# Patient Record
Sex: MALE | Race: BLACK OR AFRICAN AMERICAN | Employment: UNEMPLOYED | URBAN - METROPOLITAN AREA
[De-identification: names, ages, dates, MRNs, and addresses within clinical notes are randomized per-mention and may not be internally consistent; named-entity substitution may affect disease eponyms.]

---

## 2019-04-20 ENCOUNTER — IP HISTORICAL/CONVERTED ENCOUNTER (OUTPATIENT)
Dept: OTHER | Age: 54
End: 2019-04-20

## 2020-07-07 ENCOUNTER — IP HISTORICAL/CONVERTED ENCOUNTER (OUTPATIENT)
Dept: OTHER | Age: 55
End: 2020-07-07

## 2022-02-20 ENCOUNTER — HOSPITAL ENCOUNTER (EMERGENCY)
Age: 57
Discharge: HOME | End: 2022-02-20
Attending: EMERGENCY MEDICINE | Admitting: EMERGENCY MEDICINE
Payer: MEDICAID

## 2022-02-20 ENCOUNTER — APPOINTMENT (OUTPATIENT)
Dept: CT IMAGING | Age: 57
End: 2022-02-20
Attending: EMERGENCY MEDICINE
Payer: MEDICAID

## 2022-02-20 VITALS
WEIGHT: 170 LBS | HEART RATE: 97 BPM | HEIGHT: 73 IN | DIASTOLIC BLOOD PRESSURE: 91 MMHG | OXYGEN SATURATION: 99 % | RESPIRATION RATE: 18 BRPM | TEMPERATURE: 98 F | SYSTOLIC BLOOD PRESSURE: 210 MMHG | BODY MASS INDEX: 22.53 KG/M2

## 2022-02-20 DIAGNOSIS — M54.31 SCIATICA OF RIGHT SIDE: Primary | ICD-10-CM

## 2022-02-20 PROCEDURE — 99283 EMERGENCY DEPT VISIT LOW MDM: CPT

## 2022-02-20 PROCEDURE — 74011250636 HC RX REV CODE- 250/636: Performed by: EMERGENCY MEDICINE

## 2022-02-20 PROCEDURE — 74176 CT ABD & PELVIS W/O CONTRAST: CPT

## 2022-02-20 PROCEDURE — 96374 THER/PROPH/DIAG INJ IV PUSH: CPT

## 2022-02-20 RX ORDER — MORPHINE SULFATE 4 MG/ML
4 INJECTION INTRAVENOUS ONCE
Status: COMPLETED | OUTPATIENT
Start: 2022-02-20 | End: 2022-02-20

## 2022-02-20 RX ADMIN — MORPHINE SULFATE 4 MG: 4 INJECTION, SOLUTION INTRAMUSCULAR; INTRAVENOUS at 12:44

## 2022-02-20 NOTE — ED PROVIDER NOTES
HPI   Patient reports right-sided low back and thoracic pain with radiation down the posterior right lower extremity for several months. He reports it is acutely worsened over the last several days and he summoned EMS today because he \"could not take it anymore. \"  He denies any injury or trauma, focal weakness, anterior pain, change in urine or bowel habits or colors. No past medical history on file. No past surgical history on file. No family history on file. Social History     Socioeconomic History    Marital status: SINGLE     Spouse name: Not on file    Number of children: Not on file    Years of education: Not on file    Highest education level: Not on file   Occupational History    Not on file   Tobacco Use    Smoking status: Not on file    Smokeless tobacco: Not on file   Substance and Sexual Activity    Alcohol use: Not on file    Drug use: Not on file    Sexual activity: Not on file   Other Topics Concern    Not on file   Social History Narrative    Not on file     Social Determinants of Health     Financial Resource Strain:     Difficulty of Paying Living Expenses: Not on file   Food Insecurity:     Worried About Running Out of Food in the Last Year: Not on file    Francesca of Food in the Last Year: Not on file   Transportation Needs:     Lack of Transportation (Medical): Not on file    Lack of Transportation (Non-Medical):  Not on file   Physical Activity:     Days of Exercise per Week: Not on file    Minutes of Exercise per Session: Not on file   Stress:     Feeling of Stress : Not on file   Social Connections:     Frequency of Communication with Friends and Family: Not on file    Frequency of Social Gatherings with Friends and Family: Not on file    Attends Rastafarian Services: Not on file    Active Member of Clubs or Organizations: Not on file    Attends Club or Organization Meetings: Not on file    Marital Status: Not on file   Intimate Partner Violence:     Fear of Current or Ex-Partner: Not on file    Emotionally Abused: Not on file    Physically Abused: Not on file    Sexually Abused: Not on file   Housing Stability:     Unable to Pay for Housing in the Last Year: Not on file    Number of Places Lived in the Last Year: Not on file    Unstable Housing in the Last Year: Not on file         ALLERGIES: Patient has no known allergies. Review of Systems   Constitutional: Negative. HENT: Negative. Eyes: Negative. Respiratory: Negative. Cardiovascular: Negative. Gastrointestinal: Negative. Endocrine: Negative. Genitourinary: Negative. Musculoskeletal: Positive for back pain. Allergic/Immunologic: Negative. Neurological: Negative. Hematological: Negative. Psychiatric/Behavioral: Negative. All other systems reviewed and are negative. There were no vitals filed for this visit. Physical Exam  Vitals and nursing note reviewed. Constitutional:       General: He is not in acute distress. Appearance: Normal appearance. He is normal weight. He is not ill-appearing, toxic-appearing or diaphoretic. HENT:      Head: Normocephalic and atraumatic. Nose: Nose normal.      Mouth/Throat:      Mouth: Mucous membranes are moist.   Eyes:      Conjunctiva/sclera: Conjunctivae normal.      Pupils: Pupils are equal, round, and reactive to light. Cardiovascular:      Pulses: Normal pulses. Pulmonary:      Effort: Pulmonary effort is normal. No respiratory distress. Abdominal:      General: There is no distension. Palpations: There is no mass. Tenderness: There is no abdominal tenderness. There is no right CVA tenderness, left CVA tenderness, guarding or rebound. Hernia: No hernia is present. Musculoskeletal:         General: No swelling, tenderness, deformity or signs of injury. Normal range of motion. Cervical back: Normal range of motion. No rigidity. Right lower leg: No edema.       Left lower leg: No edema. Skin:     General: Skin is warm and dry. Capillary Refill: Capillary refill takes less than 2 seconds. Coloration: Skin is not jaundiced or pale. Findings: No bruising, erythema, lesion or rash. Neurological:      General: No focal deficit present. Mental Status: He is alert and oriented to person, place, and time. Mental status is at baseline. Cranial Nerves: No cranial nerve deficit. Sensory: No sensory deficit. Motor: No weakness. Coordination: Coordination normal.      Gait: Gait normal.      Comments: + straight leg on R   Psychiatric:         Mood and Affect: Mood normal.         Behavior: Behavior normal.          MDM     Presentation consistent with right-sided sciatica. Will provide pain relief and check imaging to exclude possible pathology. Likely discharge.   Procedures

## 2022-02-23 ENCOUNTER — TRANSCRIBE ORDER (OUTPATIENT)
Dept: SCHEDULING | Age: 57
End: 2022-02-23

## 2022-02-23 DIAGNOSIS — M54.59 POSTURAL LOW BACK PAIN: Primary | ICD-10-CM

## 2022-03-08 ENCOUNTER — HOSPITAL ENCOUNTER (OUTPATIENT)
Dept: MRI IMAGING | Age: 57
Discharge: HOME | End: 2022-03-08
Payer: MEDICAID

## 2022-03-08 DIAGNOSIS — M54.59 POSTURAL LOW BACK PAIN: ICD-10-CM

## 2022-03-08 PROCEDURE — 72148 MRI LUMBAR SPINE W/O DYE: CPT

## 2022-05-24 ENCOUNTER — APPOINTMENT (OUTPATIENT)
Dept: CT IMAGING | Age: 57
DRG: 199 | End: 2022-05-24
Attending: EMERGENCY MEDICINE
Payer: MEDICAID

## 2022-05-24 ENCOUNTER — HOSPITAL ENCOUNTER (INPATIENT)
Age: 57
LOS: 2 days | Discharge: HOME | DRG: 199 | End: 2022-05-26
Attending: EMERGENCY MEDICINE | Admitting: INTERNAL MEDICINE
Payer: MEDICAID

## 2022-05-24 ENCOUNTER — APPOINTMENT (OUTPATIENT)
Dept: MRI IMAGING | Age: 57
DRG: 199 | End: 2022-05-24
Attending: EMERGENCY MEDICINE
Payer: MEDICAID

## 2022-05-24 DIAGNOSIS — I16.9 HYPERTENSIVE CRISIS, UNSPECIFIED: ICD-10-CM

## 2022-05-24 DIAGNOSIS — R41.82 ALTERED MENTAL STATUS, UNSPECIFIED ALTERED MENTAL STATUS TYPE: Primary | ICD-10-CM

## 2022-05-24 DIAGNOSIS — G93.40 ENCEPHALOPATHY: ICD-10-CM

## 2022-05-24 DIAGNOSIS — R41.0 CONFUSION AND DISORIENTATION: ICD-10-CM

## 2022-05-24 PROBLEM — I10 ACCELERATED HYPERTENSION: Status: ACTIVE | Noted: 2022-05-24

## 2022-05-24 LAB
ALBUMIN SERPL-MCNC: 3.6 G/DL (ref 3.5–5)
ALBUMIN/GLOB SERPL: 0.9 {RATIO} (ref 1.1–2.2)
ALP SERPL-CCNC: 129 U/L (ref 45–117)
ALT SERPL-CCNC: 37 U/L (ref 12–78)
ANION GAP SERPL CALC-SCNC: 5 MMOL/L (ref 5–15)
APPEARANCE UR: CLEAR
AST SERPL W P-5'-P-CCNC: 29 U/L (ref 15–37)
ATRIAL RATE: 105 BPM
BACTERIA URNS QL MICRO: NEGATIVE /HPF
BASOPHILS # BLD: 0 K/UL (ref 0–0.1)
BASOPHILS NFR BLD: 0 % (ref 0–1)
BILIRUB SERPL-MCNC: 0.3 MG/DL (ref 0.2–1)
BILIRUB UR QL: NEGATIVE
BUN SERPL-MCNC: 49 MG/DL (ref 6–20)
BUN/CREAT SERPL: 11 (ref 12–20)
CA-I BLD-MCNC: 8.5 MG/DL (ref 8.5–10.1)
CALCULATED P AXIS, ECG09: 62 DEGREES
CALCULATED R AXIS, ECG10: 33 DEGREES
CALCULATED T AXIS, ECG11: 44 DEGREES
CHLORIDE SERPL-SCNC: 111 MMOL/L (ref 97–108)
CO2 SERPL-SCNC: 24 MMOL/L (ref 21–32)
COLOR UR: ABNORMAL
COVID-19 RAPID TEST, COVR: NOT DETECTED
CREAT SERPL-MCNC: 4.28 MG/DL (ref 0.7–1.3)
CRP SERPL-MCNC: <0.29 MG/DL (ref 0–0.6)
DIAGNOSIS, 93000: NORMAL
DIFFERENTIAL METHOD BLD: ABNORMAL
EOSINOPHIL # BLD: 0.2 K/UL (ref 0–0.4)
EOSINOPHIL NFR BLD: 3 % (ref 0–7)
ERYTHROCYTE [DISTWIDTH] IN BLOOD BY AUTOMATED COUNT: 13.1 % (ref 11.5–14.5)
FLUAV AG NPH QL IA: NEGATIVE
FLUBV AG NOSE QL IA: NEGATIVE
GLOBULIN SER CALC-MCNC: 4 G/DL (ref 2–4)
GLUCOSE SERPL-MCNC: 160 MG/DL (ref 65–100)
GLUCOSE UR STRIP.AUTO-MCNC: NORMAL MG/DL
HCT VFR BLD AUTO: 27 % (ref 36.6–50.3)
HGB BLD-MCNC: 9.1 G/DL (ref 12.1–17)
HGB UR QL STRIP: 50
IMM GRANULOCYTES # BLD AUTO: 0 K/UL (ref 0–0.04)
IMM GRANULOCYTES NFR BLD AUTO: 0 % (ref 0–0.5)
KETONES UR QL STRIP.AUTO: NEGATIVE MG/DL
LEUKOCYTE ESTERASE UR QL STRIP.AUTO: NEGATIVE
LYMPHOCYTES # BLD: 1.4 K/UL (ref 0.8–3.5)
LYMPHOCYTES NFR BLD: 22 % (ref 12–49)
MCH RBC QN AUTO: 29.2 PG (ref 26–34)
MCHC RBC AUTO-ENTMCNC: 33.7 G/DL (ref 30–36.5)
MCV RBC AUTO: 86.5 FL (ref 80–99)
MONOCYTES # BLD: 0.7 K/UL (ref 0–1)
MONOCYTES NFR BLD: 11 % (ref 5–13)
NEUTS SEG # BLD: 3.9 K/UL (ref 1.8–8)
NEUTS SEG NFR BLD: 64 % (ref 32–75)
NITRITE UR QL STRIP.AUTO: NEGATIVE
NRBC # BLD: 0 K/UL (ref 0–0.01)
NRBC BLD-RTO: 0 PER 100 WBC
P-R INTERVAL, ECG05: 220 MS
PH UR STRIP: 7 [PH] (ref 5–8)
PLATELET # BLD AUTO: 180 K/UL (ref 150–400)
PMV BLD AUTO: 9.9 FL (ref 8.9–12.9)
POTASSIUM SERPL-SCNC: 4.6 MMOL/L (ref 3.5–5.1)
PROCALCITONIN SERPL-MCNC: 0.05 NG/ML
PROT SERPL-MCNC: 7.6 G/DL (ref 6.4–8.2)
PROT UR STRIP-MCNC: >300 MG/DL
Q-T INTERVAL, ECG07: 322 MS
QRS DURATION, ECG06: 78 MS
QTC CALCULATION (BEZET), ECG08: 425 MS
RBC # BLD AUTO: 3.12 M/UL (ref 4.1–5.7)
RBC #/AREA URNS HPF: ABNORMAL /HPF (ref 0–5)
SODIUM SERPL-SCNC: 140 MMOL/L (ref 136–145)
SP GR UR REFRACTOMETRY: 1.01 (ref 1–1.03)
TROPONIN-HIGH SENSITIVITY: 50 NG/L (ref 0–76)
UROBILINOGEN UR QL STRIP.AUTO: NORMAL EU/DL (ref 0.1–1)
VENTRICULAR RATE, ECG03: 105 BPM
WBC # BLD AUTO: 6.2 K/UL (ref 4.1–11.1)
WBC URNS QL MICRO: ABNORMAL /HPF (ref 0–4)

## 2022-05-24 PROCEDURE — 74011250637 HC RX REV CODE- 250/637: Performed by: NURSE PRACTITIONER

## 2022-05-24 PROCEDURE — 86140 C-REACTIVE PROTEIN: CPT

## 2022-05-24 PROCEDURE — 87086 URINE CULTURE/COLONY COUNT: CPT

## 2022-05-24 PROCEDURE — 99285 EMERGENCY DEPT VISIT HI MDM: CPT

## 2022-05-24 PROCEDURE — G0378 HOSPITAL OBSERVATION PER HR: HCPCS

## 2022-05-24 PROCEDURE — 96374 THER/PROPH/DIAG INJ IV PUSH: CPT

## 2022-05-24 PROCEDURE — 87040 BLOOD CULTURE FOR BACTERIA: CPT

## 2022-05-24 PROCEDURE — 87804 INFLUENZA ASSAY W/OPTIC: CPT

## 2022-05-24 PROCEDURE — 93005 ELECTROCARDIOGRAM TRACING: CPT

## 2022-05-24 PROCEDURE — 96376 TX/PRO/DX INJ SAME DRUG ADON: CPT

## 2022-05-24 PROCEDURE — 84145 PROCALCITONIN (PCT): CPT

## 2022-05-24 PROCEDURE — 84484 ASSAY OF TROPONIN QUANT: CPT

## 2022-05-24 PROCEDURE — 65270000029 HC RM PRIVATE

## 2022-05-24 PROCEDURE — 36415 COLL VENOUS BLD VENIPUNCTURE: CPT

## 2022-05-24 PROCEDURE — 87635 SARS-COV-2 COVID-19 AMP PRB: CPT

## 2022-05-24 PROCEDURE — 85025 COMPLETE CBC W/AUTO DIFF WBC: CPT

## 2022-05-24 PROCEDURE — 70450 CT HEAD/BRAIN W/O DYE: CPT

## 2022-05-24 PROCEDURE — 70551 MRI BRAIN STEM W/O DYE: CPT

## 2022-05-24 PROCEDURE — 74011250636 HC RX REV CODE- 250/636: Performed by: EMERGENCY MEDICINE

## 2022-05-24 PROCEDURE — 81001 URINALYSIS AUTO W/SCOPE: CPT

## 2022-05-24 PROCEDURE — 74011000250 HC RX REV CODE- 250: Performed by: NURSE PRACTITIONER

## 2022-05-24 PROCEDURE — 80053 COMPREHEN METABOLIC PANEL: CPT

## 2022-05-24 RX ORDER — ACETAMINOPHEN 325 MG/1
650 TABLET ORAL
Status: DISCONTINUED | OUTPATIENT
Start: 2022-05-24 | End: 2022-05-26 | Stop reason: HOSPADM

## 2022-05-24 RX ORDER — MINOXIDIL 10 MG/1
5 TABLET ORAL 2 TIMES DAILY
COMMUNITY
End: 2022-05-26

## 2022-05-24 RX ORDER — ONDANSETRON 2 MG/ML
4 INJECTION INTRAMUSCULAR; INTRAVENOUS
Status: DISCONTINUED | OUTPATIENT
Start: 2022-05-24 | End: 2022-05-26 | Stop reason: HOSPADM

## 2022-05-24 RX ORDER — BISACODYL 5 MG
5 TABLET, DELAYED RELEASE (ENTERIC COATED) ORAL DAILY PRN
Status: DISCONTINUED | OUTPATIENT
Start: 2022-05-24 | End: 2022-05-26 | Stop reason: HOSPADM

## 2022-05-24 RX ORDER — HYDRALAZINE HYDROCHLORIDE 100 MG/1
50 TABLET, FILM COATED ORAL 3 TIMES DAILY
Status: ON HOLD | COMMUNITY
End: 2022-06-28 | Stop reason: SDUPTHER

## 2022-05-24 RX ORDER — HYDRALAZINE HYDROCHLORIDE 20 MG/ML
10 INJECTION INTRAMUSCULAR; INTRAVENOUS ONCE
Status: COMPLETED | OUTPATIENT
Start: 2022-05-24 | End: 2022-05-24

## 2022-05-24 RX ORDER — ATORVASTATIN CALCIUM 40 MG/1
40 TABLET, FILM COATED ORAL DAILY
COMMUNITY

## 2022-05-24 RX ORDER — HYDRALAZINE HYDROCHLORIDE 20 MG/ML
25 INJECTION INTRAMUSCULAR; INTRAVENOUS ONCE
Status: COMPLETED | OUTPATIENT
Start: 2022-05-24 | End: 2022-05-24

## 2022-05-24 RX ORDER — FUROSEMIDE 40 MG/1
40 TABLET ORAL DAILY
COMMUNITY
Start: 2022-04-26 | End: 2022-05-26

## 2022-05-24 RX ORDER — FAMOTIDINE 20 MG/1
20 TABLET, FILM COATED ORAL 2 TIMES DAILY
Status: DISCONTINUED | OUTPATIENT
Start: 2022-05-24 | End: 2022-05-24

## 2022-05-24 RX ORDER — HYDRALAZINE HYDROCHLORIDE 20 MG/ML
10 INJECTION INTRAMUSCULAR; INTRAVENOUS
Status: DISCONTINUED | OUTPATIENT
Start: 2022-05-24 | End: 2022-05-26 | Stop reason: HOSPADM

## 2022-05-24 RX ORDER — CHLORTHALIDONE 25 MG/1
25 TABLET ORAL DAILY
COMMUNITY
End: 2022-05-26

## 2022-05-24 RX ORDER — SODIUM CHLORIDE 0.9 % (FLUSH) 0.9 %
5-40 SYRINGE (ML) INJECTION AS NEEDED
Status: DISCONTINUED | OUTPATIENT
Start: 2022-05-24 | End: 2022-05-26 | Stop reason: HOSPADM

## 2022-05-24 RX ORDER — SODIUM BICARBONATE 650 MG/1
1300 TABLET ORAL 3 TIMES DAILY
COMMUNITY

## 2022-05-24 RX ORDER — GLIPIZIDE 5 MG/1
5 TABLET ORAL 2 TIMES DAILY
COMMUNITY

## 2022-05-24 RX ORDER — POLYETHYLENE GLYCOL 3350 17 G/17G
17 POWDER, FOR SOLUTION ORAL DAILY PRN
Status: DISCONTINUED | OUTPATIENT
Start: 2022-05-24 | End: 2022-05-26 | Stop reason: HOSPADM

## 2022-05-24 RX ORDER — LANOLIN ALCOHOL/MO/W.PET/CERES
325 CREAM (GRAM) TOPICAL 2 TIMES DAILY WITH MEALS
Status: ON HOLD | COMMUNITY
End: 2022-08-11

## 2022-05-24 RX ORDER — ACETAMINOPHEN 650 MG/1
650 SUPPOSITORY RECTAL
Status: DISCONTINUED | OUTPATIENT
Start: 2022-05-24 | End: 2022-05-26 | Stop reason: HOSPADM

## 2022-05-24 RX ORDER — SODIUM CHLORIDE 0.9 % (FLUSH) 0.9 %
5-40 SYRINGE (ML) INJECTION EVERY 8 HOURS
Status: DISCONTINUED | OUTPATIENT
Start: 2022-05-24 | End: 2022-05-26 | Stop reason: HOSPADM

## 2022-05-24 RX ORDER — AMLODIPINE BESYLATE 10 MG/1
10 TABLET ORAL DAILY
COMMUNITY

## 2022-05-24 RX ORDER — LOSARTAN POTASSIUM 25 MG/1
25 TABLET ORAL DAILY
COMMUNITY
End: 2022-05-26

## 2022-05-24 RX ORDER — METOPROLOL TARTRATE 25 MG/1
50 TABLET, FILM COATED ORAL EVERY 12 HOURS
Status: DISCONTINUED | OUTPATIENT
Start: 2022-05-24 | End: 2022-05-26

## 2022-05-24 RX ORDER — ISOSORBIDE MONONITRATE 20 MG/1
20 TABLET ORAL 2 TIMES DAILY
COMMUNITY
End: 2022-08-12

## 2022-05-24 RX ORDER — ONDANSETRON 4 MG/1
4 TABLET, ORALLY DISINTEGRATING ORAL
Status: DISCONTINUED | OUTPATIENT
Start: 2022-05-24 | End: 2022-05-26 | Stop reason: HOSPADM

## 2022-05-24 RX ORDER — FAMOTIDINE 20 MG/1
20 TABLET, FILM COATED ORAL DAILY
Status: DISCONTINUED | OUTPATIENT
Start: 2022-05-25 | End: 2022-05-26 | Stop reason: HOSPADM

## 2022-05-24 RX ADMIN — METOPROLOL TARTRATE 50 MG: 25 TABLET, FILM COATED ORAL at 18:31

## 2022-05-24 RX ADMIN — ACETAMINOPHEN 650 MG: 325 TABLET ORAL at 17:23

## 2022-05-24 RX ADMIN — SODIUM CHLORIDE, PRESERVATIVE FREE 10 ML: 5 INJECTION INTRAVENOUS at 23:37

## 2022-05-24 RX ADMIN — HYDRALAZINE HYDROCHLORIDE 25 MG: 20 INJECTION, SOLUTION INTRAMUSCULAR; INTRAVENOUS at 15:38

## 2022-05-24 RX ADMIN — HYDRALAZINE HYDROCHLORIDE 10 MG: 20 INJECTION, SOLUTION INTRAMUSCULAR; INTRAVENOUS at 16:24

## 2022-05-24 RX ADMIN — SODIUM CHLORIDE, PRESERVATIVE FREE 10 ML: 5 INJECTION INTRAVENOUS at 18:30

## 2022-05-24 NOTE — ED PROVIDER NOTES
EMERGENCY DEPARTMENT HISTORY AND PHYSICAL EXAM        Date: 5/24/2022  Patient Name: Keyona Irby    History of Presenting Illness     Chief Complaint   Patient presents with    Chest Pain       3:14 PM    History Provided By: Patient's Daughter, EMS and pts orthopedist who performed procedure PTA    HPI: Keyona Irby, 60 y.o. male with PMH sig for HTN (off meds) DM, CRF, and chronic back pain presents s/p low back local injections at i orthopedists office with lethargy and altered mental status. Hx per his orthopedist is patient on ly received local 5cc of lidocaine and did not receive any sedation or pain medications. Pt was found to be  be lethargic when procedure over; Pt was monitored and was transported to the ED secondary to persistence of symptoms. Hx by phone per patiens daughter is that he has been been off his BP medications for several weeks due to an insurance issue. She also stated that there was a similar episode to this 2 years ago that required admission and was determined to be related to his BP not being controlled.       PCP: Norma Steve MD    Current Facility-Administered Medications   Medication Dose Route Frequency Provider Last Rate Last Admin    amLODIPine (NORVASC) tablet 10 mg  10 mg Oral DAILY Ladene Marus, NP   10 mg at 05/25/22 1325    atorvastatin (LIPITOR) tablet 40 mg  40 mg Oral DAILY Ladene Marus, NP   40 mg at 05/25/22 1325    chlorthalidone (HYGROTON) tablet 25 mg  25 mg Oral DAILY Ladene Marus, NP   25 mg at 05/25/22 1325    [START ON 5/26/2022] ferrous sulfate tablet 325 mg  325 mg Oral ACB Ladene Marus, NP        furosemide (LASIX) tablet 40 mg  40 mg Oral DAILY Ladene Marus, NP   40 mg at 05/25/22 1325    isosorbide mononitrate (ISMO, MONOKET) tablet 20 mg  20 mg Oral BID Ladene Marus, NP   20 mg at 05/25/22 2158    minoxidiL (LONITEN) tablet 5 mg  5 mg Oral BID Ladene Marus, NP   5 mg at 05/25/22 2158    alogliptin (NESINA) tablet 12.5 mg  12.5 mg Oral DAILY Sherita Joppa, NP   12.5 mg at 05/25/22 1325    sodium bicarbonate tablet 1,300 mg  1,300 mg Oral TID Sherita Joppa, NP   1,300 mg at 05/25/22 2159    hydrALAZINE (APRESOLINE) tablet 100 mg  100 mg Oral TID Minh Mosley, PA   100 mg at 05/25/22 2158    sodium chloride (NS) flush 5-40 mL  5-40 mL IntraVENous Q8H Sherita Joppa, NP   10 mL at 05/25/22 2246    sodium chloride (NS) flush 5-40 mL  5-40 mL IntraVENous PRN Sherita Joppa, NP        acetaminophen (TYLENOL) tablet 650 mg  650 mg Oral Q6H PRN Sherita Joppa, NP   650 mg at 05/24/22 1723    Or    acetaminophen (TYLENOL) suppository 650 mg  650 mg Rectal Q6H PRN [place] Joppa, NP        polyethylene glycol (MIRALAX) packet 17 g  17 g Oral DAILY PRN Sherita Joppa, NP        bisacodyL (DULCOLAX) tablet 5 mg  5 mg Oral DAILY PRN Sherita Joppa, NP        ondansetron (ZOFRAN ODT) tablet 4 mg  4 mg Oral Q6H PRN [place] Joppa, NP        Or    [facility] injection 4 mg  4 mg IntraVENous Q6H PRN Sherita Joppa, NP        hydrALAZINE (APRESOLINE) 20 mg/mL injection 10 mg  10 mg IntraVENous Q4H PRN [place] Joppa, NP        famotidine (PEPCID) tablet 20 mg  20 mg Oral DAILY Sherita Joppa, NP   20 mg at 05/25/22 9929    metoprolol tartrate (LOPRESSOR) tablet 50 mg  50 mg Oral Q12H Sherita Joppa, NP   50 mg at 05/25/22 2158       Past History     Past Medical History:  Past Medical History:   Diagnosis Date    Chronic kidney disease     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     Diabetes ([facility] [address]     Hypertension     Lumbar spondylosis        Past Surgical History:  History reviewed. No pertinent surgical history.     Family History:  Family History   Problem Relation Age of Onset    Hypotension Mother     Hypertension Father        Social History:  Social History     Tobacco Use    Smoking status: Never Smoker    Smokeless tobacco: Never Used   Substance Use Topics    Alcohol use: Not Currently    Drug use: Not on file       Allergies:  No Known Allergies    Review of Systems   Review of Systems   Unable to perform ROS: Mental status change       Physical Exam   Physical Exam  Vitals and nursing note reviewed. Constitutional:       General: He is not in acute distress. Appearance: He is well-developed. He is ill-appearing. Comments: Very lethargic  AAM; GCS=12; ariway patent; attempts to answer questions but seemed to clearly disoriented. HENT:      Head: Normocephalic and atraumatic. Nose: Nose normal.      Mouth/Throat:      Mouth: Mucous membranes are moist.      Pharynx: No posterior oropharyngeal erythema. Eyes:      General: Vision grossly intact. Extraocular Movements: Extraocular movements intact. Conjunctiva/sclera: Conjunctivae normal.      Pupils: Pupils are equal, round, and reactive to light. Neck:      Vascular: No JVD. Trachea: No tracheal deviation. Cardiovascular:      Rate and Rhythm: Normal rate and regular rhythm. Pulses: Normal pulses. Carotid pulses are 2+ on the right side and 2+ on the left side. Radial pulses are 2+ on the right side and 2+ on the left side. Femoral pulses are 2+ on the right side and 2+ on the left side. Popliteal pulses are 2+ on the right side and 2+ on the left side. Dorsalis pedis pulses are 2+ on the right side and 2+ on the left side. Posterior tibial pulses are 2+ on the right side and 2+ on the left side. Heart sounds: Normal heart sounds. Pulmonary:      Effort: Pulmonary effort is normal.      Breath sounds: Normal breath sounds and air entry. No wheezing or rhonchi. Abdominal:      General: Bowel sounds are normal.      Palpations: Abdomen is soft. Tenderness: There is no abdominal tenderness. There is no guarding or rebound. Musculoskeletal:         General: No swelling or deformity. Normal range of motion.       Right shoulder: No swelling. Normal range of motion. Cervical back: Neck supple. Right lower leg: No edema. Left lower leg: No edema. Skin:     General: Skin is warm and dry. Capillary Refill: Capillary refill takes less than 2 seconds. Findings: No signs of injury or rash. Neurological:      General: No focal deficit present. Mental Status: He is lethargic and disoriented. Cranial Nerves: Cranial nerves are intact. Sensory: Sensation is intact. Motor: Motor function is intact. Psychiatric:         Behavior: Behavior is cooperative. Diagnostic Study Results     Labs -     Recent Results (from the past 48 hour(s))   CBC WITH AUTOMATED DIFF    Collection Time: 05/24/22  1:02 PM   Result Value Ref Range    WBC 6.2 4.1 - 11.1 K/uL    RBC 3.12 (L) 4.10 - 5.70 M/uL    HGB 9.1 (L) 12.1 - 17.0 g/dL    HCT 27.0 (L) 36.6 - 50.3 %    MCV 86.5 80.0 - 99.0 FL    MCH 29.2 26.0 - 34.0 PG    MCHC 33.7 30.0 - 36.5 g/dL    RDW 13.1 11.5 - 14.5 %    PLATELET 136 981 - 990 K/uL    MPV 9.9 8.9 - 12.9 FL    NRBC 0.0 0.0  WBC    ABSOLUTE NRBC 0.00 0.00 - 0.01 K/uL    NEUTROPHILS 64 32 - 75 %    LYMPHOCYTES 22 12 - 49 %    MONOCYTES 11 5 - 13 %    EOSINOPHILS 3 0 - 7 %    BASOPHILS 0 0 - 1 %    IMMATURE GRANULOCYTES 0 0 - 0.5 %    ABS. NEUTROPHILS 3.9 1.8 - 8.0 K/UL    ABS. LYMPHOCYTES 1.4 0.8 - 3.5 K/UL    ABS. MONOCYTES 0.7 0.0 - 1.0 K/UL    ABS. EOSINOPHILS 0.2 0.0 - 0.4 K/UL    ABS. BASOPHILS 0.0 0.0 - 0.1 K/UL    ABS. IMM.  GRANS. 0.0 0.00 - 0.04 K/UL    DF AUTOMATED     METABOLIC PANEL, COMPREHENSIVE    Collection Time: 05/24/22  1:02 PM   Result Value Ref Range    Sodium 140 136 - 145 mmol/L    Potassium 4.6 3.5 - 5.1 mmol/L    Chloride 111 (H) 97 - 108 mmol/L    CO2 24 21 - 32 mmol/L    Anion gap 5 5 - 15 mmol/L    Glucose 160 (H) 65 - 100 mg/dL    BUN 49 (H) 6 - 20 mg/dL    Creatinine 4.28 (H) 0.70 - 1.30 mg/dL    BUN/Creatinine ratio 11 (L) 12 - 20      GFR est AA 17 (L) >60 ml/min/1.73m2    GFR est non-AA 14 (L) >60 ml/min/1.73m2    Calcium 8.5 8.5 - 10.1 mg/dL    Bilirubin, total 0.3 0.2 - 1.0 mg/dL    AST (SGOT) 29 15 - 37 U/L    ALT (SGPT) 37 12 - 78 U/L    Alk. phosphatase 129 (H) 45 - 117 U/L    Protein, total 7.6 6.4 - 8.2 g/dL    Albumin 3.6 3.5 - 5.0 g/dL    Globulin 4.0 2.0 - 4.0 g/dL    A-G Ratio 0.9 (L) 1.1 - 2.2     TROPONIN-HIGH SENSITIVITY    Collection Time: 05/24/22  1:02 PM   Result Value Ref Range    Troponin-High Sensitivity 50 0 - 76 ng/L       Radiologic Studies -   MRI BRAIN WO CONT   Final Result   1. Minimal subcortical white matter changes, most likely representing minimal   small vessel ischemic changes. No acute intracranial findings. CT HEAD WO CONT   Final Result   Mild cerebellar tonsillar ectopia. No acute intracranial abnormality. No interval change since April 2019. INDICATION: Altered mental status with elevated blood pressure.     Diffusion sequences show no evidence of acute ischemia. Ventricles, sulci and  cisterns are intact. Minimal subcortical white matter changes are seen,  nonspecific but most likely representing minimal small vessel ischemia. No large  infarcts. No mass lesions or mass effect. Normal flow voids are noted in the  major intracerebral vasculature. The IACs and cerebellopontine angles image  normally. Orbits and paranasal sinuses image normally. Low-lying cerebellar  tonsils without significant protrusion into the foramen magnum. Orbits,  paranasal sinuses and mastoid air cells show no focal abnormalities.     IMPRESSION  1. Minimal subcortical white matter changes, most likely representing minimal  small vessel ischemic changes. No acute intracranial findings.        Imaging    MRI BRAIN WO CONT (Order: [id number]) - 5/24/2022      Medical Decision Making and ED Course     I have also reviewed the vital signs, available nursing notes, past medical history, past surgical history, family history and social history as made available. Vital Signs - Reviewed the patient's vital signs. Patient Vitals for the past 12 hrs:   Temp Pulse Resp BP SpO2   05/25/22 2056 98.2 °F (36.8 °C) 71 19 (!) 155/78 98 %   05/25/22 1600 -- 70 -- -- --   05/25/22 1452 -- -- -- (!) 184/91 --   05/25/22 1451 97.9 °F (36.6 °C) 69 19 (!) 183/92 99 %   05/25/22 1406 -- -- -- (!) 150/78 --   05/25/22 1200 -- 75 -- -- --     Vitals:    05/25/22 1451 05/25/22 1452 05/25/22 1600 05/25/22 2056   BP: (!) 183/92  Comment: took BP a second time (!) 184/91  (!) 155/78   BP 1 Location: Right upper arm Right upper arm  Right upper arm   BP Patient Position: At rest;Lying right side At rest;Lying right side  At rest   Pulse: 69  70 71   Temp: 97.9 °F (36.6 °C)   98.2 °F (36.8 °C)   Resp: 19   19   Height:       Weight:       SpO2: 99%   98%          EKG interpretation: (Preliminary): Performed at 1258  Rhythm: sinus tachycardia; and regular . Rate (approx.): 105; Axis: normal; 1st degree block        Medical Decision Making/Diff Dx:  Diff Dx: TIA, CVA, hypertensive urgency, hypertensive emergency, hypertensive encephalopathy, electrolyte abnormalities, hyperglycemia, honk, UTI, pneumonia,        MDM:     Interesting case of patient with acute change in mental status during local procedure who presents with significant elevation in blood pressure after having been off his blood pressure meds for several weeks. Doubt this is related to his spinal injections and may be more related to a systemic issue as he does appear to be encephalopathic. Will do screening labs head CT chest x-ray start blood pressure control and likely MRI. Will disposition per results    ED course/Re-evaluation/Consultations/Other   Patient's work-up finds no evidence of any acute intracranial injury including on MRI. There is a possibility that this is a transient global ischemia versus hypertensive encephalopathy.   Patient with reduction of blood pressure approximately 10-12 points with hydralazine IV and has had some improvement. Have opted to admit patient for further assessment and blood pressure control       Procedures     PROCEDURES:  Procedures  Tiny Moulton MD      Disposition     Admitted        Diagnosis     Clinical impression:   1. Altered mental status, unspecified altered mental status type    2. Confusion and disorientation    3. Encephalopathy    4.  Hypertensive crisis, unspecified

## 2022-05-24 NOTE — PROGRESS NOTES
Reason for Admission:  AMS                     RUR Score: N/A                   Plan for utilizing home health: Not at this time         PCP: First and Last name:  Gage Cabrera MD     Name of Practice:    Are you a current patient: Yes/No:    Approximate date of last visit: 5/23   Can you participate in a virtual visit with your PCP:                     Current Advanced Directive/Advance Care Plan: Full Code      Healthcare Decision Maker:   Click here to complete [address] including selection of the Healthcare Decision Maker Relationship (ie \"Primary\")       Deborah Solis, daughter, [address], [place], [address], [place], [phone]                              Transition of Care Plan:                    Met f/f with Pt and his two [place], they confirmed that the information on the face sheet is correct. Pt Niece stated that Pt lives by himself, she stated that his daughter lives about 10 minutes away from Pt. Pt Niece stated no HH, no DME and that Pt was independent with ADL. Pt Niece stated that Pt uses [facility] in [facility]. Pt Niece stated that family will give Pt a ride when he is D/C. CM Dispo: Home with no needs as of this time.

## 2022-05-24 NOTE — H&P
History and Physical    Patient: Deborah Mendiola MRN: [id number]  SSN: [id number]    YOB: 1965  Age: 60 y.o. Sex: male      Subjective:      Deborah Mendiola is a 60 y.o. male who presented to the ED with altered mental status a few hours following bilateral lumbar orthopedic injections at [facility]. He has a history of hypertension, chronic kidney disease, chronic lumbar radiculopathy and diabetes mellitus type 2. His sister reports his speech was slurred, he was generally weak, and lethargic. On examination in the ED he is hypertensive and tachycardic. Head CT and MRI are unremarkable. BMP and CBC are unremarkable. Mike Plascencia He is being admitted for further evaluation and treatment of hypertensive urgency and AMS of unknown etiology. Blood and urine cultures are pending. 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 No pertinent surgical history. Family History   Problem Relation Age of Onset    Hypotension Mother     Hypertension Father      Social History     Tobacco Use    Smoking status: Never Smoker    Smokeless tobacco: Never Used   Substance Use Topics    Alcohol use: Not Currently      Prior to Admission medications    Medication Sig Start Date End Date Taking? Authorizing Provider   amLODIPine (NORVASC) 10 mg tablet Take 10 mg by mouth daily. Yes Provider, Historical   atorvastatin (LIPITOR) 40 mg tablet Take 40 mg by mouth daily. Yes Provider, Historical   chlorthalidone (HYGROTON) 25 mg tablet Take 25 mg by mouth daily. Yes Provider, Historical   ferrous sulfate 325 mg (65 mg iron) tablet Take 325 mg by mouth Daily (before breakfast). Yes Provider, Historical   hydrALAZINE (APRESOLINE) 100 mg tablet Take 100 mg by mouth three (3) times daily.    Yes Provider, Historical   isosorbide mononitrate (ISMO, MONOKET) 20 mg tablet Take 20 mg by mouth two (2) times a day. Yes Provider, Historical   losartan (COZAAR) 25 mg tablet Take 25 mg by mouth daily. Yes Provider, Historical   SITagliptin (JANUVIA) 50 mg tablet Take 50 mg by mouth daily. Yes Provider, Historical   sodium bicarbonate 650 mg tablet Take 1,300 mg by mouth three (3) times daily. Yes Provider, Historical   furosemide (LASIX) 40 mg tablet Take 40 mg by mouth daily. 4/26/22 5/26/22 Yes Provider, Historical   glipiZIDE (GLUCOTROL) 5 mg tablet Take 5 mg by mouth two (2) times a day. Yes Provider, Historical   minoxidiL (LONITEN) 10 mg tablet Take 5 mg by mouth two (2) times a day. Yes Provider, Historical        No Known Allergies    Review of Systems   Constitutional: Positive for chills. Negative for fever. HENT: Negative. Eyes: Positive for photophobia. Respiratory: Negative. Cardiovascular: Negative for chest pain. Gastrointestinal: Negative. Genitourinary: Negative. Musculoskeletal: Negative for back pain, falls and neck pain. Neurological: Positive for speech change and weakness. Non-verbal, allows family to answer questions. Appears altered. Objective:     Vitals:    05/24/22 1623 05/24/22 1649 05/24/22 1723 05/24/22 1831   BP: (!) 184/74 (!) 182/82 (!) 163/68 (!) 167/79   Pulse: 100 (!) 101 (!) 101 (!) 102   Resp: 18      Temp:  98.5 °F (36.9 °C)     SpO2: 98% 98% 98%    Weight:       Height:            Physical Exam  Constitutional:       General: He is not in acute distress. Comments: Appears lethargic. HENT:      Head: Normocephalic and atraumatic. Cardiovascular:      Rate and Rhythm: Tachycardia present. Pulmonary:      Effort: Pulmonary effort is normal. No respiratory distress. Musculoskeletal:         General: Swelling present. Cervical back: Neck supple. No rigidity or tenderness. Comments: Perilumbar swelling noted. Skin:     General: Skin is warm and dry. Neurological:      Motor: Weakness present. Comments: Non-verbal, allows family to answer questions. Appears altered. Recent Results (from the past 24 hour(s))   EKG, 12 LEAD, INITIAL    Collection Time: 05/24/22 12:58 PM   Result Value Ref Range    Ventricular Rate 105 BPM    Atrial Rate 105 BPM    P-R Interval 220 ms    QRS Duration 78 ms    Q-T Interval 322 ms    QTC Calculation (Bezet) 425 ms    Calculated P Axis 62 degrees    Calculated R Axis 33 degrees    Calculated T Axis 44 degrees    Diagnosis       Sinus tachycardia with 1st degree A-V block with Premature atrial complexes   with Abberant conduction  Possible Left atrial enlargement  When compared with ECG of 07-JUL-2020 17:19,  No significant change was found  Nonspecific ST abnormality  Confirmed by Ellis Reyes M.D., Marcel Garza ([id number]) on 5/24/2022 1:36:28 PM     CBC WITH AUTOMATED DIFF    Collection Time: 05/24/22  1:02 PM   Result Value Ref Range    WBC 6.2 4.1 - 11.1 K/uL    RBC 3.12 (L) 4.10 - 5.70 M/uL    HGB 9.1 (L) 12.1 - 17.0 g/dL    HCT 27.0 (L) 36.6 - 50.3 %    MCV 86.5 80.0 - 99.0 FL    MCH 29.2 26.0 - 34.0 PG    MCHC 33.7 30.0 - 36.5 g/dL    RDW 13.1 11.5 - 14.5 %    PLATELET 451 550 - 664 K/uL    MPV 9.9 8.9 - 12.9 FL    NRBC 0.0 0.0  WBC    ABSOLUTE NRBC 0.00 0.00 - 0.01 K/uL    NEUTROPHILS 64 32 - 75 %    LYMPHOCYTES 22 12 - 49 %    MONOCYTES 11 5 - 13 %    EOSINOPHILS 3 0 - 7 %    BASOPHILS 0 0 - 1 %    IMMATURE GRANULOCYTES 0 0 - 0.5 %    ABS. NEUTROPHILS 3.9 1.8 - 8.0 K/UL    ABS. LYMPHOCYTES 1.4 0.8 - 3.5 K/UL    ABS. MONOCYTES 0.7 0.0 - 1.0 K/UL    ABS. EOSINOPHILS 0.2 0.0 - 0.4 K/UL    ABS. BASOPHILS 0.0 0.0 - 0.1 K/UL    ABS. IMM.  GRANS. 0.0 0.00 - 0.04 K/UL    DF AUTOMATED     METABOLIC PANEL, COMPREHENSIVE    Collection Time: 05/24/22  1:02 PM   Result Value Ref Range    Sodium 140 136 - 145 mmol/L    Potassium 4.6 3.5 - 5.1 mmol/L    Chloride 111 (H) 97 - 108 mmol/L    CO2 24 21 - 32 mmol/L    Anion gap 5 5 - 15 mmol/L    Glucose 160 (H) 65 - 100 mg/dL    BUN 49 (H) 6 - 20 mg/dL    Creatinine 4.28 (H) 0.70 - 1.30 mg/dL    BUN/Creatinine ratio 11 (L) 12 - 20      GFR est AA 17 (L) >60 ml/min/1.73m2    GFR est non-AA 14 (L) >60 ml/min/1.73m2    Calcium 8.5 8.5 - 10.1 mg/dL    Bilirubin, total 0.3 0.2 - 1.0 mg/dL    AST (SGOT) 29 15 - 37 U/L    ALT (SGPT) 37 12 - 78 U/L    Alk. phosphatase 129 (H) 45 - 117 U/L    Protein, total 7.6 6.4 - 8.2 g/dL    Albumin 3.6 3.5 - 5.0 g/dL    Globulin 4.0 2.0 - 4.0 g/dL    A-G Ratio 0.9 (L) 1.1 - 2.2     TROPONIN-HIGH SENSITIVITY    Collection Time: 05/24/22  1:02 PM   Result Value Ref Range    Troponin-High Sensitivity 50 0 - 76 ng/L   COVID-19 RAPID TEST    Collection Time: 05/24/22  5:18 PM   Result Value Ref Range    COVID-19 rapid test Not Detected Not Detected     INFLUENZA A & B AG (RAPID TEST)    Collection Time: 05/24/22  5:18 PM   Result Value Ref Range    Influenza A Antigen Negative Negative      Influenza B Antigen Negative Negative         XR Results (maximum last 3): No results found for this or any previous visit. CT Results (maximum last 3): Results from [place] encounter on 05/24/22    CT HEAD WO CONT    Narrative  CT SCAN OF THE BRAIN WITHOUT CONTRAST:        CLINICAL HISTORY: Altered mental status. Thin axial and coronal and sagittal reconstructions were obtained. All CT scans at this facility are performed using dose reduction optimization  techniques as appropriate to a performed exam including the following: Automated  exposure control, adjustments of the mA and/or kV according to patient size, or  use of iterative reconstruction technique. Ventricles are midline and of normal size. No intra or extra-axial hemorrhage,  mass or acute infarction a major arterial distribution is demonstrated. Pituitary gland is of normal size. Scans through the posterior fossa show no major infarction, mass or hemorrhage.       4 mm inferior protrusion of the right cerebellar tonsil and 3 mm inferior  protrusion of the left cerebellar tonsil is noted. Visualized paranasal sinuses are clear. Mastoid air cells are clear. Both orbits have a normal CT appearance. Impression  Mild cerebellar tonsillar ectopia. No acute intracranial abnormality. No interval change since April 2019. Results from [place] encounter on 02/20/22    CT ABD PELV WO CONT    Narrative  Back pain. .    Comparison CT abdomen and pelvis without contrast 7/8/2020. Technique: Axial images abdomen and pelvis without IV or oral contrast.  Multiplanar reformatting. Dose reduction: All CT scans at this facility are performed using dose reduction  optimization techniques as appropriate to a performed exam including the  following: Automated exposure control, adjustments of the mA and/or kV according  to patient's size, or use of iterative reconstruction technique. Findings: Lung bases clear. Anterior pericardial fluid, thickness 0.9 cm. Liver,  gallbladder, spleen, pancreas, left kidney, bladder unremarkable for noncontrast  technique. Midline lower abdominal malrotated right pelvic kidney. No urinary  stone or collecting system dilation. Bowel to include appendix nondilated. Few  scattered diverticula without diverticulitis. Abdominal aorta normal caliber. No  free air, free fluid, lymphadenopathy. Degenerative changes bony structures. 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 Impression  1. No acute inflammatory findings for noncontrast technique. 2. Right pelvic kidney. No urinary stone or collecting system dilation. 3. Few scattered colonic diverticula. No diverticulitis or bowel dilation. 4. Small anterior pericardial effusion. Results from [place] encounter on 01/23/15    CT ABD PELV W CONT    Narrative  **Final Report**      ICD Codes / Adm. Diagnosis: 787.91 789.09 / Diarrhea  Abdominal  pain, other  specifi  Examination:  CT ABD PELVIS W CON  - [id number] - Jan 23 2015  3:31PM  Accession No:  [id number]  Reason:  Abdominal  pain, other specified site, Loss of weight      REPORT:  INDICATION: Abdominal pain. Exam: CT of the abdomen and pelvis is performed with 5 mm collimation. The  study is performed with p.o. and 100 mL of nonionic IV Isovue-370. Portal  venous phase images were obtained. Noncontrast images were not performed. Coronal and sagittal reformatted images were also performed. There is no prior study for direct comparison. FINDINGS:    The visualized lung bases are clear. Abdomen: There is a right pelvic kidney. Kidneys otherwise enhance promptly  and symmetrically. There is diffuse fatty infiltration of the liver. The  spleen, adrenals, pancreas and gallbladder are normal. There is no  mesenteric or retroperitoneal lymphadenopathy. No thickened or dilated loop  of large or small bowel is visualized. The appendix is not visualized,  however there are no secondary signs of acute appendicitis. There is no free  intraperitoneal gas or fluid. Pelvis: Urinary bladder is partially filled and grossly normal. There is no  pelvic lymphadenopathy. There is no free fluid in the pelvis. There is no  focal fluid collection to suggest abscess. Impression  :  1. No bowel obstruction, ileus or perforation. No intra-abdominal abscess. 2. Hepatic steatosis. 3. Right pelvic kidney. Signing/Reading Doctor: LAURA Lundy ([id number])  Approved: LAURA LYMAN ([id number])  Jan 23 2015  4:36PM      MRI Results (maximum last 3): Results from [place] encounter on 05/24/22    MRI BRAIN WO CONT    Narrative  Sagittal and axial T1-weighted images of the brain were obtained along with  axial T2, gradient echo and diffusion sequences. Coronal T2-weighted images were  also reviewed. Comparison CT scan same day.     INDICATION: Altered mental status with elevated blood pressure. Diffusion sequences show no evidence of acute ischemia. Ventricles, sulci and  cisterns are intact. Minimal subcortical white matter changes are seen,  nonspecific but most likely representing minimal small vessel ischemia. No large  infarcts. No mass lesions or mass effect. Normal flow voids are noted in the  major intracerebral vasculature. The IACs and cerebellopontine angles image  normally. Orbits and paranasal sinuses image normally. Low-lying cerebellar  tonsils without significant protrusion into the foramen magnum. Orbits,  paranasal sinuses and mastoid air cells show no focal abnormalities. Impression  1. Minimal subcortical white matter changes, most likely representing minimal  small vessel ischemic changes. No acute intracranial findings. Results from Hospital Encounter encounter on 03/08/22    MRI LUMB SPINE WO CONT    Narrative  MRI LUMBAR SPINE WITHOUT INTRAVENOUS CONTRAST:    CLINICAL HISTORY:  Back pain. T1 and T2 weighted images were obtained in the axial and sagittal planes. At the L1-L2 level , no disc herniation or central or foraminal stenoses is  noted. At the L2-L3 level, desiccation and narrowing of the disc space is noted. Central protrusion of the disc is causing mild central stenosis. Superimposed  mild congenital stenoses of the central canal is also noted. Mild foraminal  stenoses is also noted bilaterally      At the L3-L4 level, bulging of the disc and mild foraminal stenoses on the left  and mild to moderate foraminal stenoses on the right are noted. Mild congenital  stenoses of the central canal is noted. Mild to moderate degenerative changes of  the facet joints are noted. At the L4-L5 level,  desiccation and mild diffuse bulging of the disc is causing  moderate foraminal stenoses bilaterally. Indentation of both L4 nerves within  the neuroforamina is noted.  Moderate to advanced degenerative changes of the  facet joint and mild thickening of the ligamentum flavum are causing mild  central stenosis. At the L5-S1 level,  no disc herniation or central or foraminal stenoses is  noted. Mild to moderate degenerative changes of the facet joints are noted. The conus medullaris  has a normal MR appearance. The visualized bone marrow has a normal MR appearance. No acute or chronic  fracture or spondylolisthesis was demonstrated. Impression  Mild central stenoses L2-L3, L3-L4 and L4-L5 levels. Moderate stenoses L4-L5 neuroforamina with indentation of the L4 nerves within  the neuroforamina. Degenerative facet joint disease as described above. Nuclear Medicine Results (maximum last 3): No results found for this or any previous visit. US Results (maximum last 3): No results found for this or any previous visit. Active Problems:    Accelerated hypertension (5/24/2022)        Assessment/Plan:     Altered mental status  Head CT completed. No acute intracranial abnormality noted. MRI brain completed. Minimal subcortical white matter changes, most likely representing minimal small vessel ischemic changes. No acute intracranial findings. Neurology consult requested. UA, urine culture and blood cultures ordered to rule out infection. Hypertensive Crisis  EKG completed. Sinus tachycardia with 1st degree A-V block with Premature atrial complexes with Abberant conduction. Possible Left atrial enlargement. Discontinued losartan. Started metoprolol. Hydralazine prn  Continue home medication, amplodipine  Cardiology consult requested    Chronic kidney Disease  Appears to be stage 4. Followed outpatient by nephrology. Anemia of chronic disease  Hemoglobin stable. Most likely due to CKD. Chronic Lumbar Radiculopathy  Diagnosed with Lumbosacral spondylosis without myelopathy  Stable. s/p lidocaine injections. Mild swelling at the site. Denies pain.        DVT Prophylaxis: SCDs  Code Status: FULL  POA/NOK:  Total Time spent in direct and indirect care including assessment review of labs and coordination of services and consultations: Greater than 75 minutes      Signed By: Jung Parker NP     May 24, 2022     Jung Parker NP

## 2022-05-24 NOTE — ED TRIAGE NOTES
[facility] pt was at [place] [id number] office when he was c/o CP and the providers administered a lidocaine injection and called EMS; EMS reported that the medical office noted that pt had periods of asphasia

## 2022-05-25 ENCOUNTER — APPOINTMENT (OUTPATIENT)
Dept: NON INVASIVE DIAGNOSTICS | Age: 57
DRG: 199 | End: 2022-05-25
Attending: STUDENT IN AN ORGANIZED HEALTH CARE EDUCATION/TRAINING PROGRAM
Payer: MEDICAID

## 2022-05-25 LAB
ANION GAP SERPL CALC-SCNC: 7 MMOL/L (ref 5–15)
BUN SERPL-MCNC: 51 MG/DL (ref 6–20)
BUN/CREAT SERPL: 11 (ref 12–20)
CA-I BLD-MCNC: 8.8 MG/DL (ref 8.5–10.1)
CHLORIDE SERPL-SCNC: 112 MMOL/L (ref 97–108)
CO2 SERPL-SCNC: 22 MMOL/L (ref 21–32)
CREAT SERPL-MCNC: 4.5 MG/DL (ref 0.7–1.3)
ECHO AO ROOT DIAM: 2.9 CM
ECHO AO ROOT INDEX: 1.44 CM/M2
ECHO AV PEAK GRADIENT: 8 MMHG
ECHO AV PEAK VELOCITY: 1.4 M/S
ECHO AV VELOCITY RATIO: 0.79
ECHO EST RA PRESSURE: 3 MMHG
ECHO LA DIAMETER INDEX: 2.04 CM/M2
ECHO LA DIAMETER: 4.1 CM
ECHO LA TO AORTIC ROOT RATIO: 1.41
ECHO LV E' LATERAL VELOCITY: 8 CM/S
ECHO LV E' SEPTAL VELOCITY: 6 CM/S
ECHO LV EJECTION FRACTION BIPLANE: 61 % (ref 55–100)
ECHO LV FRACTIONAL SHORTENING: 33 % (ref 28–44)
ECHO LV INTERNAL DIMENSION DIASTOLE INDEX: 2.39 CM/M2
ECHO LV INTERNAL DIMENSION DIASTOLIC: 4.8 CM (ref 4.2–5.9)
ECHO LV INTERNAL DIMENSION SYSTOLIC INDEX: 1.59 CM/M2
ECHO LV INTERNAL DIMENSION SYSTOLIC: 3.2 CM
ECHO LV IVSD: 1.8 CM (ref 0.6–1)
ECHO LV MASS 2D: 350.7 G (ref 88–224)
ECHO LV MASS INDEX 2D: 174.5 G/M2 (ref 49–115)
ECHO LV POSTERIOR WALL DIASTOLIC: 1.5 CM (ref 0.6–1)
ECHO LV RELATIVE WALL THICKNESS RATIO: 0.63
ECHO LVOT PEAK GRADIENT: 5 MMHG
ECHO LVOT PEAK VELOCITY: 1.1 M/S
ECHO MV A VELOCITY: 1.4 M/S
ECHO MV E DECELERATION TIME (DT): 204 MS
ECHO MV E VELOCITY: 0.94 M/S
ECHO MV E/A RATIO: 0.67
ECHO MV E/E' LATERAL: 11.75
ECHO MV E/E' RATIO (AVERAGED): 13.71
ECHO MV E/E' SEPTAL: 15.67
ECHO MV REGURGITANT PEAK GRADIENT: 121 MMHG
ECHO MV REGURGITANT PEAK VELOCITY: 5.5 M/S
ECHO PULMONARY ARTERY END DIASTOLIC PRESSURE: 4 MMHG
ECHO PV MAX VELOCITY: 1.3 M/S
ECHO PV PEAK GRADIENT: 7 MMHG
ECHO PV REGURGITANT MAX VELOCITY: 1 M/S
ECHO RIGHT VENTRICULAR SYSTOLIC PRESSURE (RVSP): 36 MMHG
ECHO RV INTERNAL DIMENSION: 3.9 CM
ECHO TV REGURGITANT MAX VELOCITY: 2.88 M/S
ECHO TV REGURGITANT PEAK GRADIENT: 33 MMHG
ERYTHROCYTE [DISTWIDTH] IN BLOOD BY AUTOMATED COUNT: 13.2 % (ref 11.5–14.5)
EST. AVERAGE GLUCOSE BLD GHB EST-MCNC: 157 MG/DL
GLUCOSE BLD STRIP.AUTO-MCNC: 127 MG/DL (ref 65–117)
GLUCOSE BLD STRIP.AUTO-MCNC: 166 MG/DL (ref 65–117)
GLUCOSE BLD STRIP.AUTO-MCNC: 211 MG/DL (ref 65–117)
GLUCOSE SERPL-MCNC: 126 MG/DL (ref 65–100)
HBA1C MFR BLD: 7.1 % (ref 4–5.6)
HCT VFR BLD AUTO: 25.3 % (ref 36.6–50.3)
HGB BLD-MCNC: 8.3 G/DL (ref 12.1–17)
MCH RBC QN AUTO: 28.8 PG (ref 26–34)
MCHC RBC AUTO-ENTMCNC: 32.8 G/DL (ref 30–36.5)
MCV RBC AUTO: 87.8 FL (ref 80–99)
NRBC # BLD: 0 K/UL (ref 0–0.01)
NRBC BLD-RTO: 0 PER 100 WBC
PERFORMED BY, TECHID: ABNORMAL
PLATELET # BLD AUTO: 173 K/UL (ref 150–400)
PMV BLD AUTO: 10.1 FL (ref 8.9–12.9)
POTASSIUM SERPL-SCNC: 4.7 MMOL/L (ref 3.5–5.1)
RBC # BLD AUTO: 2.88 M/UL (ref 4.1–5.7)
SODIUM SERPL-SCNC: 141 MMOL/L (ref 136–145)
WBC # BLD AUTO: 6.1 K/UL (ref 4.1–11.1)

## 2022-05-25 PROCEDURE — 65270000029 HC RM PRIVATE

## 2022-05-25 PROCEDURE — 93306 TTE W/DOPPLER COMPLETE: CPT

## 2022-05-25 PROCEDURE — 74011250637 HC RX REV CODE- 250/637: Performed by: INTERNAL MEDICINE

## 2022-05-25 PROCEDURE — 74011250637 HC RX REV CODE- 250/637: Performed by: STUDENT IN AN ORGANIZED HEALTH CARE EDUCATION/TRAINING PROGRAM

## 2022-05-25 PROCEDURE — 36415 COLL VENOUS BLD VENIPUNCTURE: CPT

## 2022-05-25 PROCEDURE — 74011000250 HC RX REV CODE- 250: Performed by: NURSE PRACTITIONER

## 2022-05-25 PROCEDURE — 85027 COMPLETE CBC AUTOMATED: CPT

## 2022-05-25 PROCEDURE — 82962 GLUCOSE BLOOD TEST: CPT

## 2022-05-25 PROCEDURE — 97161 PT EVAL LOW COMPLEX 20 MIN: CPT

## 2022-05-25 PROCEDURE — 97530 THERAPEUTIC ACTIVITIES: CPT

## 2022-05-25 PROCEDURE — G0378 HOSPITAL OBSERVATION PER HR: HCPCS

## 2022-05-25 PROCEDURE — 80048 BASIC METABOLIC PNL TOTAL CA: CPT

## 2022-05-25 PROCEDURE — 83036 HEMOGLOBIN GLYCOSYLATED A1C: CPT

## 2022-05-25 PROCEDURE — 74011250637 HC RX REV CODE- 250/637: Performed by: NURSE PRACTITIONER

## 2022-05-25 RX ORDER — AMLODIPINE BESYLATE 5 MG/1
10 TABLET ORAL DAILY
Status: DISCONTINUED | OUTPATIENT
Start: 2022-05-25 | End: 2022-05-26 | Stop reason: HOSPADM

## 2022-05-25 RX ORDER — ISOSORBIDE MONONITRATE 20 MG/1
20 TABLET ORAL 2 TIMES DAILY
Status: DISCONTINUED | OUTPATIENT
Start: 2022-05-25 | End: 2022-05-26 | Stop reason: HOSPADM

## 2022-05-25 RX ORDER — ALOGLIPTIN 12.5 MG/1
12.5 TABLET, FILM COATED ORAL DAILY
Status: DISCONTINUED | OUTPATIENT
Start: 2022-05-25 | End: 2022-05-26 | Stop reason: HOSPADM

## 2022-05-25 RX ORDER — MINOXIDIL 2.5 MG/1
5 TABLET ORAL 2 TIMES DAILY
Status: DISCONTINUED | OUTPATIENT
Start: 2022-05-25 | End: 2022-05-26 | Stop reason: HOSPADM

## 2022-05-25 RX ORDER — SODIUM BICARBONATE 650 MG/1
1300 TABLET ORAL 3 TIMES DAILY
Status: DISCONTINUED | OUTPATIENT
Start: 2022-05-25 | End: 2022-05-26 | Stop reason: HOSPADM

## 2022-05-25 RX ORDER — CHLORTHALIDONE 25 MG/1
25 TABLET ORAL DAILY
Status: DISCONTINUED | OUTPATIENT
Start: 2022-05-25 | End: 2022-05-26

## 2022-05-25 RX ORDER — HYDRALAZINE HYDROCHLORIDE 50 MG/1
100 TABLET, FILM COATED ORAL 3 TIMES DAILY
Status: DISCONTINUED | OUTPATIENT
Start: 2022-05-25 | End: 2022-05-26 | Stop reason: HOSPADM

## 2022-05-25 RX ORDER — FUROSEMIDE 40 MG/1
40 TABLET ORAL DAILY
Status: DISCONTINUED | OUTPATIENT
Start: 2022-05-25 | End: 2022-05-26 | Stop reason: HOSPADM

## 2022-05-25 RX ORDER — LANOLIN ALCOHOL/MO/W.PET/CERES
325 CREAM (GRAM) TOPICAL
Status: DISCONTINUED | OUTPATIENT
Start: 2022-05-26 | End: 2022-05-26 | Stop reason: HOSPADM

## 2022-05-25 RX ORDER — ATORVASTATIN CALCIUM 40 MG/1
40 TABLET, FILM COATED ORAL DAILY
Status: DISCONTINUED | OUTPATIENT
Start: 2022-05-25 | End: 2022-05-26 | Stop reason: HOSPADM

## 2022-05-25 RX ADMIN — METOPROLOL TARTRATE 50 MG: 25 TABLET, FILM COATED ORAL at 09:14

## 2022-05-25 RX ADMIN — ISOSORBIDE MONONITRATE 20 MG: 20 TABLET ORAL at 13:25

## 2022-05-25 RX ADMIN — ISOSORBIDE MONONITRATE 20 MG: 20 TABLET ORAL at 21:58

## 2022-05-25 RX ADMIN — FAMOTIDINE 20 MG: 20 TABLET ORAL at 09:14

## 2022-05-25 RX ADMIN — SODIUM CHLORIDE, PRESERVATIVE FREE 10 ML: 5 INJECTION INTRAVENOUS at 22:46

## 2022-05-25 RX ADMIN — SODIUM BICARBONATE 1300 MG: 650 TABLET ORAL at 15:38

## 2022-05-25 RX ADMIN — HYDRALAZINE HYDROCHLORIDE 100 MG: 50 TABLET, FILM COATED ORAL at 21:58

## 2022-05-25 RX ADMIN — HYDRALAZINE HYDROCHLORIDE 100 MG: 50 TABLET, FILM COATED ORAL at 15:38

## 2022-05-25 RX ADMIN — ALOGLIPTIN 12.5 MG: 12.5 TABLET, FILM COATED ORAL at 13:25

## 2022-05-25 RX ADMIN — FUROSEMIDE 40 MG: 40 TABLET ORAL at 13:25

## 2022-05-25 RX ADMIN — SODIUM CHLORIDE, PRESERVATIVE FREE 10 ML: 5 INJECTION INTRAVENOUS at 13:28

## 2022-05-25 RX ADMIN — SODIUM CHLORIDE, PRESERVATIVE FREE 10 ML: 5 INJECTION INTRAVENOUS at 05:06

## 2022-05-25 RX ADMIN — SODIUM BICARBONATE 1300 MG: 650 TABLET ORAL at 13:26

## 2022-05-25 RX ADMIN — SODIUM BICARBONATE 1300 MG: 650 TABLET ORAL at 21:59

## 2022-05-25 RX ADMIN — CHLORTHALIDONE 25 MG: 25 TABLET ORAL at 13:25

## 2022-05-25 RX ADMIN — MINOXIDIL 5 MG: 2.5 TABLET ORAL at 21:58

## 2022-05-25 RX ADMIN — AMLODIPINE BESYLATE 10 MG: 5 TABLET ORAL at 13:25

## 2022-05-25 RX ADMIN — MINOXIDIL 5 MG: 2.5 TABLET ORAL at 13:25

## 2022-05-25 RX ADMIN — ATORVASTATIN CALCIUM 40 MG: 40 TABLET, FILM COATED ORAL at 13:25

## 2022-05-25 RX ADMIN — METOPROLOL TARTRATE 50 MG: 25 TABLET, FILM COATED ORAL at 21:58

## 2022-05-25 NOTE — PROGRESS NOTES
Hospitalist Progress Note    Subjective:   Daily Progress Note: 2022 10:22 AM    Patient is feeling much improved from yesterday. Overnight he had some chest pain and headache but it resolved without intervention. Patient was about to shower and was in no acute distress. Current Facility-Administered Medications   Medication Dose Route Frequency    sodium chloride (NS) flush 5-40 mL  5-40 mL IntraVENous Q8H    sodium chloride (NS) flush 5-40 mL  5-40 mL IntraVENous PRN    acetaminophen (TYLENOL) tablet 650 mg  650 mg Oral Q6H PRN    Or    acetaminophen (TYLENOL) suppository 650 mg  650 mg Rectal Q6H PRN    polyethylene glycol (MIRALAX) packet 17 g  17 g Oral DAILY PRN    bisacodyL (DULCOLAX) tablet 5 mg  5 mg Oral DAILY PRN    ondansetron (ZOFRAN ODT) tablet 4 mg  4 mg Oral Q6H PRN    Or    ondansetron (ZOFRAN) injection 4 mg  4 mg IntraVENous Q6H PRN    hydrALAZINE (APRESOLINE) 20 mg/mL injection 10 mg  10 mg IntraVENous Q4H PRN    famotidine (PEPCID) tablet 20 mg  20 mg Oral DAILY    metoprolol tartrate (LOPRESSOR) tablet 50 mg  50 mg Oral Q12H        Review of Systems  Review of Systems   Constitutional: Negative. Respiratory: Negative. Cardiovascular: Negative. Gastrointestinal: Negative. All other systems reviewed and are negative. Objective:     Visit Vitals  BP (!) 155/80 (BP 1 Location: Right arm, BP Patient Position: At rest)   Pulse 78   Temp 98.9 °F (37.2 °C)   Resp 20   Ht 6' 1\" (1.854 m)   Wt 77.3 kg (170 lb 6.7 oz)   SpO2 98%   BMI 22.48 kg/m²      O2 Device: None (Room air)    Temp (24hrs), Av.5 °F (36.9 °C), Min:98.2 °F (36.8 °C), Max:98.9 °F (37.2 °C)      No intake/output data recorded. No intake/output data recorded.     Recent Results (from the past 24 hour(s))   EKG, 12 LEAD, INITIAL    Collection Time: 22 12:58 PM   Result Value Ref Range    Ventricular Rate 105 BPM    Atrial Rate 105 BPM    P-R Interval 220 ms    QRS Duration 78 ms    Q-T Interval 322 ms    QTC Calculation (Bezet) 425 ms    Calculated P Axis 62 degrees    Calculated R Axis 33 degrees    Calculated T Axis 44 degrees    Diagnosis       Sinus tachycardia with 1st degree A-V block with Premature atrial complexes   with Abberant conduction  Possible Left atrial enlargement  When compared with ECG of 07-JUL-2020 17:19,  No significant change was found  Nonspecific ST abnormality  Confirmed by Dominique Vanegas M.D., Triston Broody ([id number]) on 5/24/2022 1:36:28 PM     CBC WITH AUTOMATED DIFF    Collection Time: 05/24/22  1:02 PM   Result Value Ref Range    WBC 6.2 4.1 - 11.1 K/uL    RBC 3.12 (L) 4.10 - 5.70 M/uL    HGB 9.1 (L) 12.1 - 17.0 g/dL    HCT 27.0 (L) 36.6 - 50.3 %    MCV 86.5 80.0 - 99.0 FL    MCH 29.2 26.0 - 34.0 PG    MCHC 33.7 30.0 - 36.5 g/dL    RDW 13.1 11.5 - 14.5 %    PLATELET 764 825 - 250 K/uL    MPV 9.9 8.9 - 12.9 FL    NRBC 0.0 0.0  WBC    ABSOLUTE NRBC 0.00 0.00 - 0.01 K/uL    NEUTROPHILS 64 32 - 75 %    LYMPHOCYTES 22 12 - 49 %    MONOCYTES 11 5 - 13 %    EOSINOPHILS 3 0 - 7 %    BASOPHILS 0 0 - 1 %    IMMATURE GRANULOCYTES 0 0 - 0.5 %    ABS. NEUTROPHILS 3.9 1.8 - 8.0 K/UL    ABS. LYMPHOCYTES 1.4 0.8 - 3.5 K/UL    ABS. MONOCYTES 0.7 0.0 - 1.0 K/UL    ABS. EOSINOPHILS 0.2 0.0 - 0.4 K/UL    ABS. BASOPHILS 0.0 0.0 - 0.1 K/UL    ABS. IMM.  GRANS. 0.0 0.00 - 0.04 K/UL    DF AUTOMATED     METABOLIC PANEL, COMPREHENSIVE    Collection Time: 05/24/22  1:02 PM   Result Value Ref Range    Sodium 140 136 - 145 mmol/L    Potassium 4.6 3.5 - 5.1 mmol/L    Chloride 111 (H) 97 - 108 mmol/L    CO2 24 21 - 32 mmol/L    Anion gap 5 5 - 15 mmol/L    Glucose 160 (H) 65 - 100 mg/dL    BUN 49 (H) 6 - 20 mg/dL    Creatinine 4.28 (H) 0.70 - 1.30 mg/dL    BUN/Creatinine ratio 11 (L) 12 - 20      GFR est AA 17 (L) >60 ml/min/1.73m2    GFR est non-AA 14 (L) >60 ml/min/1.73m2    Calcium 8.5 8.5 - 10.1 mg/dL    Bilirubin, total 0.3 0.2 - 1.0 mg/dL    AST (SGOT) 29 15 - 37 U/L    ALT (SGPT) 37 12 - 78 U/L Alk. phosphatase 129 (H) 45 - 117 U/L    Protein, total 7.6 6.4 - 8.2 g/dL    Albumin 3.6 3.5 - 5.0 g/dL    Globulin 4.0 2.0 - 4.0 g/dL    A-G Ratio 0.9 (L) 1.1 - 2.2     TROPONIN-HIGH SENSITIVITY    Collection Time: 05/24/22  1:02 PM   Result Value Ref Range    Troponin-High Sensitivity 50 0 - 76 ng/L   COVID-19 RAPID TEST    Collection Time: 05/24/22  5:18 PM   Result Value Ref Range    COVID-19 rapid test Not Detected Not Detected     INFLUENZA A & B AG (RAPID TEST)    Collection Time: 05/24/22  5:18 PM   Result Value Ref Range    Influenza A Antigen Negative Negative      Influenza B Antigen Negative Negative     URINALYSIS W/MICROSCOPIC    Collection Time: 05/24/22  5:18 PM   Result Value Ref Range    Color Straw     Appearance Clear Clear    Specific gravity 1.010 1.003 - 1.030      pH (UA) 7.0 5.0 - 8.0      Protein >300 (A) Negative mg/dL    Glucose Normal (A) Negative mg/dL    Ketone Negative Negative mg/dL    Bilirubin Negative Negative      Blood 50 (A) Negative      Urobilinogen Normal 0.1 - 1.0 EU/dL    Nitrites Negative Negative      Leukocyte Esterase Negative Negative      WBC 0-5 0 - 4 /hpf    RBC 5-10 0 - 5 /hpf    Bacteria Negative Negative /hpf   C REACTIVE PROTEIN, QT    Collection Time: 05/24/22  7:53 PM   Result Value Ref Range    C-Reactive protein <0.29 0.00 - 0.60 mg/dL   PROCALCITONIN    Collection Time: 05/24/22  7:53 PM   Result Value Ref Range    Procalcitonin 0.05 (H) 0 ng/mL   GLUCOSE, POC    Collection Time: 05/25/22  8:06 AM   Result Value Ref Range    Glucose (POC) 127 (H) 65 - 117 mg/dL    Performed by [facility]    METABOLIC PANEL, BASIC    Collection Time: 05/25/22  8:18 AM   Result Value Ref Range    Sodium 141 136 - 145 mmol/L    Potassium 4.7 3.5 - 5.1 mmol/L    Chloride 112 (H) 97 - 108 mmol/L    CO2 22 21 - 32 mmol/L    Anion gap 7 5 - 15 mmol/L    Glucose 126 (H) 65 - 100 mg/dL    BUN 51 (H) 6 - 20 mg/dL    Creatinine 4.50 (H) 0.70 - 1.30 mg/dL BUN/Creatinine ratio 11 (L) 12 - 20      GFR est AA 16 (L) >60 ml/min/1.73m2    GFR est non-AA 14 (L) >60 ml/min/1.73m2    Calcium 8.8 8.5 - 10.1 mg/dL   CBC W/O DIFF    Collection Time: 05/25/22  8:18 AM   Result Value Ref Range    WBC 6.1 4.1 - 11.1 K/uL    RBC 2.88 (L) 4.10 - 5.70 M/uL    HGB 8.3 (L) 12.1 - 17.0 g/dL    HCT 25.3 (L) 36.6 - 50.3 %    MCV 87.8 80.0 - 99.0 FL    MCH 28.8 26.0 - 34.0 PG    MCHC 32.8 30.0 - 36.5 g/dL    RDW 13.2 11.5 - 14.5 %    PLATELET 333 495 - 814 K/uL    MPV 10.1 8.9 - 12.9 FL    NRBC 0.0 0.0  WBC    ABSOLUTE NRBC 0.00 0.00 - 0.01 K/uL        MRI BRAIN WO CONT   Final Result   1. Minimal subcortical white matter changes, most likely representing minimal   small vessel ischemic changes. No acute intracranial findings. CT HEAD WO CONT   Final Result   Mild cerebellar tonsillar ectopia. No acute intracranial abnormality. No interval change since April 2019. PHYSICAL EXAM:    Physical Exam  Vitals and nursing note reviewed. HENT:      Head: Normocephalic and atraumatic. Mouth/Throat:      Mouth: Mucous membranes are moist.   Cardiovascular:      Rate and Rhythm: Normal rate and regular rhythm. Pulmonary:      Effort: No respiratory distress. Breath sounds: No wheezing. Abdominal:      General: Bowel sounds are normal. There is no distension. Palpations: Abdomen is soft. Tenderness: There is no abdominal tenderness. Genitourinary:     Comments: No barakat present  Musculoskeletal:      Right lower leg: No edema. Left lower leg: No edema. Skin:     Capillary Refill: Capillary refill takes less than 2 seconds. Neurological:      Mental Status: He is alert and oriented to person, place, and time.    Psychiatric:         Mood and Affect: Mood normal.          Data Review    Recent Results (from the past 24 hour(s))   EKG, 12 LEAD, INITIAL    Collection Time: 05/24/22 12:58 PM   Result Value Ref Range    Ventricular Rate 105 BPM    Atrial Rate 105 BPM    P-R Interval 220 ms    QRS Duration 78 ms    Q-T Interval 322 ms    QTC Calculation (Bezet) 425 ms    Calculated P Axis 62 degrees    Calculated R Axis 33 degrees    Calculated T Axis 44 degrees    Diagnosis       Sinus tachycardia with 1st degree A-V block with Premature atrial complexes   with Abberant conduction  Possible Left atrial enlargement  When compared with ECG of 07-JUL-2020 17:19,  No significant change was found  Nonspecific ST abnormality  Confirmed by Natahn Banegas M.D., Fabiana Crawford ([id number]) on 5/24/2022 1:36:28 PM     CBC WITH AUTOMATED DIFF    Collection Time: 05/24/22  1:02 PM   Result Value Ref Range    WBC 6.2 4.1 - 11.1 K/uL    RBC 3.12 (L) 4.10 - 5.70 M/uL    HGB 9.1 (L) 12.1 - 17.0 g/dL    HCT 27.0 (L) 36.6 - 50.3 %    MCV 86.5 80.0 - 99.0 FL    MCH 29.2 26.0 - 34.0 PG    MCHC 33.7 30.0 - 36.5 g/dL    RDW 13.1 11.5 - 14.5 %    PLATELET 548 927 - 162 K/uL    MPV 9.9 8.9 - 12.9 FL    NRBC 0.0 0.0  WBC    ABSOLUTE NRBC 0.00 0.00 - 0.01 K/uL    NEUTROPHILS 64 32 - 75 %    LYMPHOCYTES 22 12 - 49 %    MONOCYTES 11 5 - 13 %    EOSINOPHILS 3 0 - 7 %    BASOPHILS 0 0 - 1 %    IMMATURE GRANULOCYTES 0 0 - 0.5 %    ABS. NEUTROPHILS 3.9 1.8 - 8.0 K/UL    ABS. LYMPHOCYTES 1.4 0.8 - 3.5 K/UL    ABS. MONOCYTES 0.7 0.0 - 1.0 K/UL    ABS. EOSINOPHILS 0.2 0.0 - 0.4 K/UL    ABS. BASOPHILS 0.0 0.0 - 0.1 K/UL    ABS. IMM.  GRANS. 0.0 0.00 - 0.04 K/UL    DF AUTOMATED     METABOLIC PANEL, COMPREHENSIVE    Collection Time: 05/24/22  1:02 PM   Result Value Ref Range    Sodium 140 136 - 145 mmol/L    Potassium 4.6 3.5 - 5.1 mmol/L    Chloride 111 (H) 97 - 108 mmol/L    CO2 24 21 - 32 mmol/L    Anion gap 5 5 - 15 mmol/L    Glucose 160 (H) 65 - 100 mg/dL    BUN 49 (H) 6 - 20 mg/dL    Creatinine 4.28 (H) 0.70 - 1.30 mg/dL    BUN/Creatinine ratio 11 (L) 12 - 20      GFR est AA 17 (L) >60 ml/min/1.73m2    GFR est non-AA 14 (L) >60 ml/min/1.73m2    Calcium 8.5 8.5 - 10.1 mg/dL Bilirubin, total 0.3 0.2 - 1.0 mg/dL    AST (SGOT) 29 15 - 37 U/L    ALT (SGPT) 37 12 - 78 U/L    Alk.  phosphatase 129 (H) 45 - 117 U/L    Protein, total 7.6 6.4 - 8.2 g/dL    Albumin 3.6 3.5 - 5.0 g/dL    Globulin 4.0 2.0 - 4.0 g/dL    A-G Ratio 0.9 (L) 1.1 - 2.2     TROPONIN-HIGH SENSITIVITY    Collection Time: 05/24/22  1:02 PM   Result Value Ref Range    Troponin-High Sensitivity 50 0 - 76 ng/L   COVID-19 RAPID TEST    Collection Time: 05/24/22  5:18 PM   Result Value Ref Range    COVID-19 rapid test Not Detected Not Detected     INFLUENZA A & B AG (RAPID TEST)    Collection Time: 05/24/22  5:18 PM   Result Value Ref Range    Influenza A Antigen Negative Negative      Influenza B Antigen Negative Negative     URINALYSIS W/MICROSCOPIC    Collection Time: 05/24/22  5:18 PM   Result Value Ref Range    Color Straw     Appearance Clear Clear    Specific gravity 1.010 1.003 - 1.030      pH (UA) 7.0 5.0 - 8.0      Protein >300 (A) Negative mg/dL    Glucose Normal (A) Negative mg/dL    Ketone Negative Negative mg/dL    Bilirubin Negative Negative      Blood 50 (A) Negative      Urobilinogen Normal 0.1 - 1.0 EU/dL    Nitrites Negative Negative      Leukocyte Esterase Negative Negative      WBC 0-5 0 - 4 /hpf    RBC 5-10 0 - 5 /hpf    Bacteria Negative Negative /hpf   C REACTIVE PROTEIN, QT    Collection Time: 05/24/22  7:53 PM   Result Value Ref Range    C-Reactive protein <0.29 0.00 - 0.60 mg/dL   PROCALCITONIN    Collection Time: 05/24/22  7:53 PM   Result Value Ref Range    Procalcitonin 0.05 (H) 0 ng/mL   GLUCOSE, POC    Collection Time: 05/25/22  8:06 AM   Result Value Ref Range    Glucose (POC) 127 (H) 65 - 117 mg/dL    Performed by [place], BASIC    Collection Time: 05/25/22  8:18 AM   Result Value Ref Range    Sodium 141 136 - 145 mmol/L    Potassium 4.7 3.5 - 5.1 mmol/L    Chloride 112 (H) 97 - 108 mmol/L    CO2 22 21 - 32 mmol/L    Anion gap 7 5 - 15 mmol/L    Glucose 126 (H) 65 - 100 mg/dL    BUN 51 (H) 6 - 20 mg/dL    Creatinine 4.50 (H) 0.70 - 1.30 mg/dL    BUN/Creatinine ratio 11 (L) 12 - 20      GFR est AA 16 (L) >60 ml/min/1.73m2    GFR est non-AA 14 (L) >60 ml/min/1.73m2    Calcium 8.8 8.5 - 10.1 mg/dL   CBC W/O DIFF    Collection Time: 05/25/22  8:18 AM   Result Value Ref Range    WBC 6.1 4.1 - 11.1 K/uL    RBC 2.88 (L) 4.10 - 5.70 M/uL    HGB 8.3 (L) 12.1 - 17.0 g/dL    HCT 25.3 (L) 36.6 - 50.3 %    MCV 87.8 80.0 - 99.0 FL    MCH 28.8 26.0 - 34.0 PG    MCHC 32.8 30.0 - 36.5 g/dL    RDW 13.2 11.5 - 14.5 %    PLATELET 112 887 - 553 K/uL    MPV 10.1 8.9 - 12.9 FL    NRBC 0.0 0.0  WBC    ABSOLUTE NRBC 0.00 0.00 - 0.01 K/uL        Assessment/Plan:     Active Problems:    Accelerated hypertension (5/24/2022)        Hospital Course:    Sylvia Dash is a 13-year-old male with a past medical history of diabetes, hypertension, chronic radiculopathy, and CKD who presented with altered mental status a few hours following bilateral lumbar orthopedic injections at [facility]. Vital signs in ED showed hypertensive urgency and tachycardia. CT of the head showed mild cerebellar tonsillar ectopically without acute intracranial abnormality. MRI of the brain showed minimal subcortical white matter changes most likely representing minimal small vessel ischemic changes without acute intracranial findings. Initial labs significant for creatinine of 4.28, hemoglobin of 9.1, and urinalysis with proteinuria and hematuria. COVID/flu negative. Patient admitted for further evaluation and treatment of hypertensive urgency and AMS of unknown etiology. Blood culture pending. Urine culture pending. Neurology and cardiology consulted. ECHO pending.     Altered mental status - resolved    Near syncope   secondary to vasovagal vagal effect  Neurology following    Hypertensive urgency  EKG showed sinus tachycardia with first-degree AV block with premature atrial complexes with aberrant conduction and possible left atrial enlargement  Continue on metoprolol, amlodipine, chlorthalidone, imdur, and hydralazine TID  ECHO pending  Cardiology consulted    CKD stage IV  Creatinine stable  Continue on sodium bicarbonate  Followed by outpatient neurology    Anemia of chronic disease  Hemoglobin stable  Continue on iron  Transfuse if hemoglobin less than 7 or hemodynamically unstable    Chronic lumbar radiculopathy  Lumbosacral spondylolysis without myelopathy  s/p lidocaine injections  Stable     Diabetes mellitus, type II  A1c pending  Hold glipizide while IP    DVT Prophylaxis: SCDs  GI Prophylaxis: Pepcid  Discharge and disposition barriers: cardio / ECHO 24 hrs    Care Plan discussed with: Patient/Family, Nurse and     Total time spent with patient: 35 minutes.

## 2022-05-25 NOTE — PROGRESS NOTES
PHYSICAL THERAPY EVALUATION/DISCHARGE  Patient: Gallo Modi (39 y.o. male)  Date: 5/25/2022  Primary Diagnosis: Accelerated hypertension [I10]       Precautions: Falls       ASSESSMENT  Pt is a 61 yo male admitted on 5/24/2022 for AMS few hours after receiving bilateral lumbar injections at [facility] with pt's sister reporting pt experiencing slurred speech, general weakness, and lethargy as well as being found to be hypertensive and tachycardic in the ED; and is currently being treated for AMS and hypertensive crisis . PMH: CKD, Chronic Radiculopathy, DM, HTN, Lumbar Stenosis. Pt A&O x 4. Per pt report, pt resides alone in a 1 story house with 10 FRIDA, bilateral handrails, pt was independent for ADLS/IADLS, ambulating without need of AD with mobility prior to admission. Pt reports that in the past year, he has fallen 1-2 times and usually due to loss of balance or tripping on an object. DME owned: None. Based on the objective data described below, the patient presents at/close to functional baseline for mobility and amb. Pt semi-supine upon PT arrival, agreeable to evaluation. Pt required SBA for bed mobility, supine <> sit and sit <> stand transfers. Pt amb 100 in hallway with gt belt and CGA with HHA provided after 20-30 feet due to generalized unsteadiness with ambulation; demonstrates slow, step through gt pattern with no knee buckling noted, but pt experienced 1 episode of LOB with pt being able to regain balance using support from UE and wall. Pt did fair with session today with assistance and guarding provided due to unsteadiness of gait. At end of session, educated pt on importance of monitoring BP as well as normalized values and values indicating need to contact medical professional with pt verbalizing understanding.  Also discussed with pt about resources after discharge including outpatient therapy services and possible use of assistive device due to general unsteadiness with ambulation noted during session with pt verbalizing understanding. Pt has no skilled acute PT needs at this time noted by PT or reported by pt, will DC skilled PT following evaluation; pt verbalized understanding and agreement. Current Level of Function Impacting Discharge (mobility/balance): SBA-CGA    Other factors to consider for discharge: acute medical status, PMH     PLAN :  Recommendations and Planned Interventions: DC from skilled PT services following evaluation    Frequency/Duration: Patient will be followed by physical therapy: DC from services following evaluation due to pt being at functional baseline; no skilled therapy required during admission, please reorder if needed     Recommendation for discharge: (in order for the patient to meet his/her long term goals)  [address]    This discharge recommendation:  Has been made in collaboration with the attending provider and/or case management    IF patient discharges home will need the following DME: to be determined (TBD)       SUBJECTIVE:   Patient stated I am doing good. My back isn't hurting as much. 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 No pertinent surgical history. Home Situation  Home Environment: Private residence  # Steps to Enter: 10  Rails to Enter: Yes  Hand Rails : Bilateral  One/Two Story Residence: One story  Living Alone: Yes  Support Systems: Friend/Neighbor  Patient Expects to be Discharged to[de-identified] Home  Current DME Used/Available at Home: None  Tub or Shower Type: Shower    EXAMINATION/PRESENTATION/DECISION MAKING:   Critical Behavior:  Neurologic State: Alert  Orientation Level: Oriented X4  Cognition: Appropriate decision making,Follows commands     Hearing:   Auditory  Auditory Impairment: None  Skin:  Intact where visible  Edema: no edema noted  Range Of Motion:  AROM: Within functional limits           PROM: Within functional limits           Strength:    Strength: Generally decreased, functional                    Tone & Sensation:   Tone: Normal              Sensation: Intact               Coordination:  Coordination: Within functional limits  Vision:      Functional Mobility:  Bed Mobility:  Rolling: Stand-by assistance  Supine to Sit: Stand-by assistance  Sit to Supine: Stand-by assistance  Scooting: Stand-by assistance  Transfers:  Sit to Stand: Stand-by assistance  Stand to Sit: Stand-by assistance                       Balance:   Sitting: Intact; Without support  Standing: Impaired; Without support  Standing - Static: Fair  Standing - Dynamic : Fair  Ambulation/Gait Training:  Distance (ft): 100 Feet (ft) (bed>toilet>hallway>bed)  Assistive Device: Gait belt; Other (comment) (hand held assist provided around 20-30 feet)  Ambulation - Level of Assistance: Contact guard assistance     Gait Description (WDL): Exceptions to [facility]           Base of Support: Narrowed     Speed/Mallory: Slow;Pace decreased (<100 feet/min)        Therapeutic Exercises:   Not completed during this session    Functional Measure:  [address] [facility]  How much difficulty does the patient currently have. .. Unable A Lot A Little None   1. Turning over in bed (including adjusting bedclothes, sheets and blankets)? [] 1   [] 2   [] 3   [x] 4   2. Sitting down on and standing up from a chair with arms ( e.g., wheelchair, bedside commode, etc.)   [] 1   [] 2   [] 3   [x] 4   3. Moving from lying on back to sitting on the side of the bed? [] 1   [] 2   [] 3   [x] 4          How much help from another person does the patient currently need. .. Total A Lot A Little None   4. Moving to and from a bed to a chair (including a wheelchair)? [] 1   [] 2   [] 3   [x] 4   5. Need to walk in hospital room? [] 1   [] 2   [x] 3   [] 4   6. Climbing 3-5 steps with a railing? [] 1   [] 2   [x] 3   [] 4   © 2007, Trustees of [facility] [employer], under license to [employer]. All rights reserved     Score:  Initial: 22/24 Most Recent: X (Date: 5/25/22)   Interpretation of Tool:  Represents activities that are increasingly more difficult (i.e. Bed mobility, Transfers, Gait). Score 24 23 22-20 19-15 14-10 9-7 6   Modifier CH CI CJ CK CL CM CN           Physical Therapy Evaluation Charge Determination   History Examination Presentation Decision-Making   HIGH Complexity :3+ comorbidities / personal factors will impact the outcome/ POC  HIGH Complexity : 4+ Standardized tests and measures addressing body structure, function, activity limitation and / or participation in recreation  LOW Complexity : Stable, uncomplicated  Other outcome measures [facility] 6  Low      Based on the above components, the patient evaluation is determined to be of the following complexity level: LOW     Pain Rating:  Pt reports 6/10 pain of low back    Activity Tolerance:   Good and requires rest breaks    After treatment patient left in no apparent distress:   Supine in bed, Call bell within reach and Side rails x 3 and nursing updated      COMMUNICATION/EDUCATION:   The patients plan of care was discussed with: Occupational therapist, Registered nurse and Case management. Fall prevention education was provided and the patient/caregiver indicated understanding., Patient/family have participated as able in goal setting and plan of care. and Patient/family agree to work toward stated goals and plan of care. FLORI Enriquez, under direct supervision of Breana Courser, PT, DPT provided care and treatment of pt with verbal consent from pt received.      Thank you for this referral.  FLORI Enriquez, PT, DPT   Time Calculation: 22 mins

## 2022-05-25 NOTE — PROGRESS NOTES
Two person skin assessment completed via Charlotte Lang RN and attending RN. No noted abnormalities. Skin clean, dry and intact. Continent x 2. No moisture noted. Noted bandage of bilateral lumbar orthopedic injection sites.

## 2022-05-25 NOTE — PROGRESS NOTES
OT eval order received and acknowledged. Per PT note, pt is functioning at baseline and per pt he is walking to the bathroom and not needing assistance for ADL's. Pt reports no need for skilled OT services at this time. OT evaluation order will therefore be discontinued as pt has no acute OT needs. Please reorder OT if pt functional status changes. Thank you.

## 2022-05-25 NOTE — ED NOTES
TRANSFER - OUT REPORT:    Verbal report given to Estefany camp rn on Vera Cotton  being transferred to 4e room 403 for routine progression of care       Report consisted of patients Situation, Background, Assessment and   Recommendations(SBAR). Information from the following report(s) SBAR, ED Summary, Intake/Output, MAR, Recent Results and Cardiac Rhythm nsr was reviewed with the receiving nurse. Lines:   Peripheral IV 05/24/22 Posterior;Right Hand (Active)   Site Assessment Clean, dry, & intact 05/24/22 2013   Phlebitis Assessment 0 05/24/22 2013   Infiltration Assessment 0 05/24/22 2013   Dressing Status Clean, dry, & intact 05/24/22 2013        Opportunity for questions and clarification was provided.       Patient transported with:   Monitor  Tech

## 2022-05-25 NOTE — PROGRESS NOTES
Nutrition Assessment     Type and Reason for Visit: (P) Positive nutrition screen (MST2)    Nutrition Recommendations/Plan:   Continue current diet  Monitor and record PO intakes and Bms in I/Os     Nutrition Assessment:  (P) Admitted for AMS s/p b/l lumbar orthopedic injections. MST2 for ?wt loss. Intakes at baseline, no weight loss. Labs: Na 141, K 4.1, BUN 51, Creat 4.5, Gluc 126, Alb 3.6. Meds: atorvastatin, lasix, sodium bicarbonate. Malnutrition Assessment:  Malnutrition Status: No malnutrition     Estimated Daily Nutrient Needs:  Energy (kcal):  (P) 1933kcal (25kcal/kg)  Protein (g):  (P) 77g (1g/kg)       Fluid (ml/day):  (P) 1933mL (1mL/kcal)    Nutrition Related Findings:  (P) Nourished per NFPE. No n/v, d/c, or problems chewing/swallowing. No edema. BM 5/24. Current Nutrition Therapies:  ADULT DIET Regular; 3 carb choices (45 gm/meal); Low Sodium (2 gm);  Diabetic diet, sugar substitute, no juice, diet soda    Anthropometric Measures:  Height:  (P) 6' 0.99\" (185.4 cm)  Current Body Wt:  (P) 77.3 kg (170 lb 6.7 oz)  BMI: (P) 22.5    Nutrition Diagnosis:   (P) No nutrition diagnosis at this time     Nutrition Interventions:   Food and/or Nutrient Delivery: (P) Continue current diet  Nutrition Education/Counseling: (P) Education not indicated  Coordination of Nutrition Care: (P) Continue to monitor while inpatient    Nutrition Monitoring and Evaluation:   Behavioral-Environmental Outcomes: (P) None identified  Food/Nutrient Intake Outcomes: (P) Food and nutrient intake  Physical Signs/Symptoms Outcomes: (P) Meal time behavior,Weight    Discharge Planning:    (P) Too soon to determine    Doris Monson RD  Contact: [phone]

## 2022-05-25 NOTE — CONSULTS
NEUROLOGY CONSULT    Name Rio Bustamante Age 60 y.o. MRN [id number]  1965     Referring Physician: Kelly Montenegro MD    Chief Complaint: Acute encephalopathy     This is a 60 y.o. right handed -American male who was in the pain management clinic where he was given a shot in the back. Per patient it was hurting really bad in the back when they were giving the shot and when he got up from the table, he felt extremely dizzy lightheaded with feeling of fainting weak and not feeling right. He was helped into the chair. Patient did not remember how the blood pressure was he said maybe slightly elevated. He was there for couple of hours but did not get better and was transferred to the [facility] ER. Patient was still having symptoms of dizziness and was also confused. He had a CT scan of the head followed by an MRI of the brain without contrast and both were unremarkable. He was admitted and neurology was consulted. At the time of my evaluation this morning the patient was feeling much better. He said he felt confused earlier in the morning but right now he answered all the questions appropriately. Assessment and Plan:  1. Near syncope, secondary to vasovagal effect: Caused by the severe pain from the shot given to his back. His blood pressure was elevated later, probably reactive. He already have an MRI which was essentially unremarkable and no additional neurological work-up is indicated at this time. He is alert awake and oriented x4 and the rest of the neurological examination is also normal.  2.  Status post lower back injection, probably BHUMI, this probably created the above situation. 3.  Lumbosacral spondylosis and chronic radiculopathy due to radiation  4. Hypertension  5. Diabetes mellitus  6. Chronic kidney disease    I will sign off the case for now, however, please do not hesitate to call if needed again.     Thank you for the consult,    No Known Allergies     Current Facility-Administered Medications   Medication Dose Route Frequency    sodium chloride (NS) flush 5-40 mL  5-40 mL IntraVENous Q8H    sodium chloride (NS) flush 5-40 mL  5-40 mL IntraVENous PRN    acetaminophen (TYLENOL) tablet 650 mg  650 mg Oral Q6H PRN    Or    acetaminophen (TYLENOL) suppository 650 mg  650 mg Rectal Q6H PRN    polyethylene glycol (MIRALAX) packet 17 g  17 g Oral DAILY PRN    bisacodyL (DULCOLAX) tablet 5 mg  5 mg Oral DAILY PRN    ondansetron (ZOFRAN ODT) tablet 4 mg  4 mg Oral Q6H PRN    Or    ondansetron (ZOFRAN) injection 4 mg  4 mg IntraVENous Q6H PRN    hydrALAZINE (APRESOLINE) 20 mg/mL injection 10 mg  10 mg IntraVENous Q4H PRN    famotidine (PEPCID) tablet 20 mg  20 mg Oral DAILY    metoprolol tartrate (LOPRESSOR) tablet 50 mg  50 mg Oral Q12H     Past Medical History:   Diagnosis Date    Chronic kidney disease     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     Diabetes ([facility] [address]     Hypertension     Lumbar spondylosis      Social History     Tobacco Use    Smoking status: Never Smoker    Smokeless tobacco: Never Used   Substance Use Topics    Alcohol use: Not Currently    Drug use: Not on file     Exam  Visit Vitals  BP (!) 155/80 (BP 1 Location: Right arm, BP Patient Position: At rest)   Pulse 82   Temp 98.9 °F (37.2 °C)   Resp 20   Ht 6' 1\" (1.854 m)   Wt 77.3 kg (170 lb 6.7 oz)   SpO2 98%   BMI 22.48 kg/m²     General: Well developed, well nourished. Patient in no distress   Head: Normocephalic, atraumatic, anicteric sclera   Neck Normal ROM, No thyromegally   Lungs:  Clear to auscultation    Cardiac: Regular rate and rhythm with no murmurs. Abd: Bowel sounds were audible   Ext: No pedal edema   Skin: Supple no rash     NeurologicExam:  Mental Status: Alert and oriented to person place and time   Speech: Fluent no aphasia or dysarthria.    Cranial Nerves:   Pupils are equal round and reactive to light and accommodation, extraocular movements are intact and full, visual fields are intact by confrontation, no nystagmus noted, face is symmetric, sensation in face is intact and symmetric, hearing is intact and symmetric, tongue and uvula are in midline with normal movements, palate is elevating equally, shoulder shrug is intact and symmetric. Motor:  Full and symmetric strength of upper and lower ext . Normal bulk and tone. Reflexes:   Deep tendon reflexes 2+/4 and symmetric. Sensory:   Symmetric and intact    Gait:  Gait is deferred   Tremor:   No tremor noted. Cerebellar:  Coordination intact for finger-nose-finger. Neurovascular: No carotid bruits.  No JVD   Lab Review  Lab Results   Component Value Date/Time    WBC 6.2 05/24/2022 01:02 PM    HCT 27.0 (L) 05/24/2022 01:02 PM    HGB 9.1 (L) 05/24/2022 01:02 PM    PLATELET 091 36/11/7462 01:02 PM     Lab Results   Component Value Date/Time    Sodium 140 05/24/2022 01:02 PM    Potassium 4.6 05/24/2022 01:02 PM    Chloride 111 (H) 05/24/2022 01:02 PM    CO2 24 05/24/2022 01:02 PM    Glucose 160 (H) 05/24/2022 01:02 PM    BUN 49 (H) 05/24/2022 01:02 PM    Creatinine 4.28 (H) 05/24/2022 01:02 PM    Calcium 8.5 05/24/2022 01:02 PM     No results found for: B12LT, FOL, RBCF  No results found for: LDL, LDLC, DLDLP  No results found for: HBA1C, BFD5DIJA, ZKG8OZVJ  No components found for: TROPQUANT  No results found for: DONOVAN

## 2022-05-25 NOTE — PROGRESS NOTES
Problem: Falls - Risk of  Goal: *Absence of Falls  Description: Document Jaxon Zapata Fall Risk and appropriate interventions in the flowsheet.   Outcome: Progressing Towards Goal  Note: Fall Risk Interventions:  Mobility Interventions: Utilize walker, cane, or other assistive device    Mentation Interventions: Adequate sleep, hydration, pain control,Reorient patient,Toileting rounds                        Problem: Patient Education: Go to Patient Education Activity  Goal: Patient/Family Education  Outcome: Progressing Towards Goal     Problem: General Medical Care Plan  Goal: *Vital signs within specified parameters  Outcome: Progressing Towards Goal  Goal: *Labs within defined limits  Outcome: Progressing Towards Goal  Goal: *Absence of infection signs and symptoms  Outcome: Progressing Towards Goal  Goal: *Fluid volume balance  Outcome: Progressing Towards Goal  Goal: *Optimize nutritional status  Outcome: Progressing Towards Goal  Goal: *Progressive mobility and function (eg: ADL's)  Outcome: Progressing Towards Goal     Problem: Patient Education: Go to Patient Education Activity  Goal: Patient/Family Education  Outcome: Progressing Towards Goal

## 2022-05-26 VITALS
BODY MASS INDEX: 22.59 KG/M2 | WEIGHT: 170.42 LBS | HEIGHT: 73 IN | DIASTOLIC BLOOD PRESSURE: 84 MMHG | TEMPERATURE: 98.7 F | HEART RATE: 70 BPM | OXYGEN SATURATION: 98 % | SYSTOLIC BLOOD PRESSURE: 161 MMHG | RESPIRATION RATE: 19 BRPM

## 2022-05-26 PROBLEM — R55 VASOVAGAL NEAR-SYNCOPE: Status: ACTIVE | Noted: 2022-05-26

## 2022-05-26 LAB
ALBUMIN SERPL-MCNC: 3 G/DL (ref 3.5–5)
ALBUMIN/GLOB SERPL: 0.8 {RATIO} (ref 1.1–2.2)
ALP SERPL-CCNC: 102 U/L (ref 45–117)
ALT SERPL-CCNC: 31 U/L (ref 12–78)
ANION GAP SERPL CALC-SCNC: 5 MMOL/L (ref 5–15)
AST SERPL W P-5'-P-CCNC: 26 U/L (ref 15–37)
BACTERIA SPEC CULT: NORMAL
BASOPHILS # BLD: 0 K/UL (ref 0–0.1)
BASOPHILS NFR BLD: 0 % (ref 0–1)
BILIRUB SERPL-MCNC: 0.2 MG/DL (ref 0.2–1)
BUN SERPL-MCNC: 56 MG/DL (ref 6–20)
BUN/CREAT SERPL: 13 (ref 12–20)
CA-I BLD-MCNC: 8.6 MG/DL (ref 8.5–10.1)
CHLORIDE SERPL-SCNC: 113 MMOL/L (ref 97–108)
CO2 SERPL-SCNC: 24 MMOL/L (ref 21–32)
CREAT SERPL-MCNC: 4.24 MG/DL (ref 0.7–1.3)
DIFFERENTIAL METHOD BLD: ABNORMAL
EOSINOPHIL # BLD: 0.2 K/UL (ref 0–0.4)
EOSINOPHIL NFR BLD: 3 % (ref 0–7)
ERYTHROCYTE [DISTWIDTH] IN BLOOD BY AUTOMATED COUNT: 13.1 % (ref 11.5–14.5)
GLOBULIN SER CALC-MCNC: 3.7 G/DL (ref 2–4)
GLUCOSE BLD STRIP.AUTO-MCNC: 108 MG/DL (ref 65–117)
GLUCOSE SERPL-MCNC: 105 MG/DL (ref 65–100)
HCT VFR BLD AUTO: 23.7 % (ref 36.6–50.3)
HGB BLD-MCNC: 7.8 G/DL (ref 12.1–17)
IMM GRANULOCYTES # BLD AUTO: 0 K/UL (ref 0–0.04)
IMM GRANULOCYTES NFR BLD AUTO: 0 % (ref 0–0.5)
LYMPHOCYTES # BLD: 1.7 K/UL (ref 0.8–3.5)
LYMPHOCYTES NFR BLD: 27 % (ref 12–49)
MCH RBC QN AUTO: 29.1 PG (ref 26–34)
MCHC RBC AUTO-ENTMCNC: 32.9 G/DL (ref 30–36.5)
MCV RBC AUTO: 88.4 FL (ref 80–99)
MONOCYTES # BLD: 0.8 K/UL (ref 0–1)
MONOCYTES NFR BLD: 12 % (ref 5–13)
NEUTS SEG # BLD: 3.5 K/UL (ref 1.8–8)
NEUTS SEG NFR BLD: 58 % (ref 32–75)
NRBC # BLD: 0 K/UL (ref 0–0.01)
NRBC BLD-RTO: 0 PER 100 WBC
PERFORMED BY, TECHID: NORMAL
PLATELET # BLD AUTO: 149 K/UL (ref 150–400)
PMV BLD AUTO: 10.3 FL (ref 8.9–12.9)
POTASSIUM SERPL-SCNC: 4.8 MMOL/L (ref 3.5–5.1)
PROT SERPL-MCNC: 6.7 G/DL (ref 6.4–8.2)
RBC # BLD AUTO: 2.68 M/UL (ref 4.1–5.7)
SODIUM SERPL-SCNC: 142 MMOL/L (ref 136–145)
SPECIAL REQUESTS,SREQ: NORMAL
WBC # BLD AUTO: 6.2 K/UL (ref 4.1–11.1)

## 2022-05-26 PROCEDURE — 74011000250 HC RX REV CODE- 250: Performed by: NURSE PRACTITIONER

## 2022-05-26 PROCEDURE — 36415 COLL VENOUS BLD VENIPUNCTURE: CPT

## 2022-05-26 PROCEDURE — 82962 GLUCOSE BLOOD TEST: CPT

## 2022-05-26 PROCEDURE — 85025 COMPLETE CBC W/AUTO DIFF WBC: CPT

## 2022-05-26 PROCEDURE — 74011250637 HC RX REV CODE- 250/637: Performed by: NURSE PRACTITIONER

## 2022-05-26 PROCEDURE — 80053 COMPREHEN METABOLIC PANEL: CPT

## 2022-05-26 PROCEDURE — G0378 HOSPITAL OBSERVATION PER HR: HCPCS

## 2022-05-26 PROCEDURE — 74011250637 HC RX REV CODE- 250/637: Performed by: STUDENT IN AN ORGANIZED HEALTH CARE EDUCATION/TRAINING PROGRAM

## 2022-05-26 RX ORDER — CARVEDILOL 12.5 MG/1
25 TABLET ORAL 2 TIMES DAILY WITH MEALS
Status: DISCONTINUED | OUTPATIENT
Start: 2022-05-26 | End: 2022-05-26 | Stop reason: HOSPADM

## 2022-05-26 RX ORDER — CARVEDILOL 25 MG/1
25 TABLET ORAL 2 TIMES DAILY WITH MEALS
Qty: 60 TABLET | Refills: 0 | Status: SHIPPED | OUTPATIENT
Start: 2022-05-26 | End: 2022-08-12

## 2022-05-26 RX ADMIN — SODIUM BICARBONATE 1300 MG: 650 TABLET ORAL at 09:03

## 2022-05-26 RX ADMIN — FUROSEMIDE 40 MG: 40 TABLET ORAL at 09:03

## 2022-05-26 RX ADMIN — CHLORTHALIDONE 25 MG: 25 TABLET ORAL at 09:02

## 2022-05-26 RX ADMIN — FERROUS SULFATE TAB 325 MG (65 MG ELEMENTAL FE) 325 MG: 325 (65 FE) TAB at 09:09

## 2022-05-26 RX ADMIN — ISOSORBIDE MONONITRATE 20 MG: 20 TABLET ORAL at 09:02

## 2022-05-26 RX ADMIN — SODIUM CHLORIDE, PRESERVATIVE FREE 10 ML: 5 INJECTION INTRAVENOUS at 09:10

## 2022-05-26 RX ADMIN — FAMOTIDINE 20 MG: 20 TABLET ORAL at 09:03

## 2022-05-26 RX ADMIN — METOPROLOL TARTRATE 50 MG: 25 TABLET, FILM COATED ORAL at 09:02

## 2022-05-26 RX ADMIN — AMLODIPINE BESYLATE 10 MG: 5 TABLET ORAL at 09:02

## 2022-05-26 RX ADMIN — HYDRALAZINE HYDROCHLORIDE 100 MG: 50 TABLET, FILM COATED ORAL at 09:03

## 2022-05-26 RX ADMIN — ALOGLIPTIN 12.5 MG: 12.5 TABLET, FILM COATED ORAL at 09:02

## 2022-05-26 RX ADMIN — ATORVASTATIN CALCIUM 40 MG: 40 TABLET, FILM COATED ORAL at 09:02

## 2022-05-26 RX ADMIN — MINOXIDIL 5 MG: 2.5 TABLET ORAL at 09:02

## 2022-05-26 NOTE — PROGRESS NOTES
Confirmed with Dr. Manuela Wu patient is cleared for discharge from cardiologist standpoint. Patient will follow-up outpatient in 2 weeks.

## 2022-05-26 NOTE — ROUTINE PROCESS
Bedside and Verbal shift change report given to [facility] (oncoming nurse) by Olman Gabriel (offgoing nurse). Report included the following information SBAR and MAR.

## 2022-05-26 NOTE — DISCHARGE SUMMARY
Admit date: 5/24/2022   Admitting Provider: Ksenia Blum MD    Discharge date: 5/26/2022  Discharging Provider: JOSE Aguayo      * Admission Diagnoses: Accelerated hypertension [I10]    * Discharge Diagnoses:    Hospital Problems as of 5/26/2022 Never Reviewed          Codes Class Noted - Resolved POA    Accelerated hypertension ICD-10-CM: I10  ICD-9-CM: 401.0  5/24/2022 - Present Unknown              * Hospital Course:     Aron Guajardo is a 55-year-old male with a past medical history of diabetes, hypertension, chronic radiculopathy, and CKD who presented with altered mental status a few hours following bilateral lumbar orthopedic injections at [facility]. Vital signs in ED showed hypertensive urgency and tachycardia. CT of the head showed mild cerebellar tonsillar ectopically without acute intracranial abnormality. MRI of the brain showed minimal subcortical white matter changes most likely representing minimal small vessel ischemic changes without acute intracranial findings. Initial labs significant for creatinine of 4.28, hemoglobin of 9.1, and urinalysis with proteinuria and hematuria. COVID/flu negative. Patient admitted for further evaluation and treatment of hypertensive urgency and AMS of unknown etiology. Blood culture negative. Urine culture with no growth. Neurology and cardiology consulted. ECHO showed LV EF 50 - 60% with moderate MVR, TVR, PVR, and echocardiographic features are suggestive of infiltrative cardiac amyloidosis. Patient chlorthalidone and metoprolol discontinued. Patient started on carvedilol. Patient to follow up with cardiology and PCP within 2 weeks from discharge. All questions answered at time of encounter.     * Procedures:   * No surgery found *      Consults:   Neurology and cardiology    Significant Diagnostic Studies: As discussed in hospital course    Discharge Exam:  Visit Vitals  BP (!) 161/84 (BP 1 Location: Right upper arm, BP Patient Position: Lying;Lying right side)   Pulse 70   Temp 98.7 °F (37.1 °C)   Resp 19   Ht 6' 0.99\" (1.854 m)   Wt 77.3 kg (170 lb 6.7 oz)   SpO2 98%   BMI 22.49 kg/m²       PHYSICAL EXAM:  Vitals and nursing note reviewed. HENT:      Head: Normocephalic and atraumatic. Mouth/Throat:      Mouth: Mucous membranes are moist.   Cardiovascular:      Rate and Rhythm: Normal rate and regular rhythm. Pulmonary:      Effort: No respiratory distress. Breath sounds: No wheezing. Abdominal:      General: Bowel sounds are normal. There is no distension. Palpations: Abdomen is soft. Tenderness: There is no abdominal tenderness. Genitourinary:     Comments: No barakat present  Musculoskeletal:      Right lower leg: No edema. Left lower leg: No edema. Skin:     Capillary Refill: Capillary refill takes less than 2 seconds. Neurological:      Mental Status: He is alert and oriented to person, place, and time. Psychiatric:         Mood and Affect: Mood normal.     * Discharge Condition: improved  * Disposition: Home    Discharge Medications:  Current Discharge Medication List      START taking these medications    Details   carvediloL (COREG) 25 mg tablet Take 1 Tablet by mouth two (2) times daily (with meals). Qty: 60 Tablet, Refills: 0  Start date: 5/26/2022         CONTINUE these medications which have NOT CHANGED    Details   amLODIPine (NORVASC) 10 mg tablet Take 10 mg by mouth daily. atorvastatin (LIPITOR) 40 mg tablet Take 40 mg by mouth daily. ferrous sulfate 325 mg (65 mg iron) tablet Take 325 mg by mouth Daily (before breakfast). hydrALAZINE (APRESOLINE) 100 mg tablet Take 100 mg by mouth three (3) times daily. isosorbide mononitrate (ISMO, MONOKET) 20 mg tablet Take 20 mg by mouth two (2) times a day. SITagliptin (JANUVIA) 50 mg tablet Take 50 mg by mouth daily. sodium bicarbonate 650 mg tablet Take 1,300 mg by mouth three (3) times daily.       furosemide (LASIX) 40 mg tablet Take 40 mg by mouth daily. glipiZIDE (GLUCOTROL) 5 mg tablet Take 5 mg by mouth two (2) times a day. STOP taking these medications       chlorthalidone (HYGROTON) 25 mg tablet Comments:   Reason for Stopping:         losartan (COZAAR) 25 mg tablet Comments:   Reason for Stopping:         minoxidiL (LONITEN) 10 mg tablet Comments:   Reason for Stopping:               * Follow-up Care/Patient Instructions:   Activity: Activity as tolerated  Diet: Cardiac diet  Wound Care: None needed    Follow-up Information     Follow up With Specialties Details Why Contact Info    Marium Landon MD [address] [place] [address] Vascular Surgery, Cardiovascular Disease Physician, Interventional Cardiology Physician Schedule an appointment as soon as possible for a visit in 2 weeks Post-hospitalization follow-up [address] [facility] [address] [phone]      Blaine Eli MD Family Medicine Schedule an appointment as soon as possible for a visit in 2 weeks Post-hospitalization follow-up [address] [facility] [address] [phone]            Discharge summary greater than 35 minutes spent with the patient performing discharge instructions, medication review and physical exam    Signed:  JOSE Perry  5/26/2022  9:13 AM

## 2022-05-26 NOTE — CONSULTS
Consult    Patient: Liudmila Minaya MRN: [id number]  SSN: [id number]    YOB: 1965  Age: 60 y.o. Sex: male      Subjective:      Liudmila Minaya is a 60 y.o. male who is being seen for chest pain. Patient with chronic kidney disease, radiculopathy, diabetes, hypertension. He is non-smoker. Denied illicit drug use. He went to orthopedic clinic for lumbar injection and after that he became confused, weak with slurring of speech. He had some chest pain but he became hypertensive and tachycardic. He complains of some intermittent palpitation especially at nighttime when he is laying flat. These episodes do last for few seconds. They are not associated with dizziness. Denied recent change in his weight or lower extremity swelling. Patient has a history of hypertension but he has been off his medications. Due to insurance issues he ran out of his medications several weeks ago and he stopped taking his blood pressure medications. Blood pressure was elevated by the end of the procedure. At this point he denied having any chest pain or shortness of breath or dizziness or palpitation. Past Medical History:   Diagnosis Date    Chronic kidney disease     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     Diabetes ([facility] [address]     Hypertension     Lumbar spondylosis      History reviewed. No pertinent surgical history.    Family History   Problem Relation Age of Onset    Hypotension Mother     Hypertension Father      Social History     Tobacco Use    Smoking status: Never Smoker    Smokeless tobacco: Never Used   Substance Use Topics    Alcohol use: Not Currently      Current Facility-Administered Medications   Medication Dose Route Frequency Provider Last Rate Last Admin    amLODIPine (NORVASC) tablet 10 mg  10 mg Oral DAILY Docia Custard, NP   10 mg at 05/26/22 0902    atorvastatin (LIPITOR) tablet 40 mg  40 mg Oral DAILY Docia Custard, NP   40 mg at 05/26/22 0902    chlorthalidone (HYGROTON) tablet 25 mg  25 mg Oral DAILY Robby Parcel, NP   25 mg at 05/26/22 7842    ferrous sulfate tablet 325 mg  325 mg Oral ACB Robby Parcel, NP   325 mg at 05/26/22 7663    furosemide (LASIX) tablet 40 mg  40 mg Oral DAILY Robby Parcel, NP   40 mg at 05/26/22 9140    isosorbide mononitrate (ISMO, MONOKET) tablet 20 mg  20 mg Oral BID Robby Parcel, NP   20 mg at 05/26/22 0902    minoxidiL (LONITEN) tablet 5 mg  5 mg Oral BID Robby Parcel, NP   5 mg at 05/26/22 1412    alogliptin (NESINA) tablet 12.5 mg  12.5 mg Oral DAILY Robby Parcel, NP   12.5 mg at 05/26/22 4780    sodium bicarbonate tablet 1,300 mg  1,300 mg Oral TID Robby Parcel, NP   1,300 mg at 05/26/22 4258    hydrALAZINE (APRESOLINE) tablet 100 mg  100 mg Oral TID [facility]   100 mg at 05/26/22 7394    sodium chloride (NS) flush 5-40 mL  5-40 mL IntraVENous Q8H Robby Parcel, NP   10 mL at 05/26/22 0910    sodium chloride (NS) flush 5-40 mL  5-40 mL IntraVENous PRN Robby Parcel, NP        acetaminophen (TYLENOL) tablet 650 mg  650 mg Oral Q6H PRN Robby Parcel, NP   650 mg at 05/24/22 1723    Or    acetaminophen (TYLENOL) suppository 650 mg  650 mg Rectal Q6H PRN Robby Parcel, NP        polyethylene glycol (MIRALAX) packet 17 g  17 g Oral DAILY PRN Robby Parcel, NP        bisacodyL (DULCOLAX) tablet 5 mg  5 mg Oral DAILY PRN Robby Parcel, NP        ondansetron (ZOFRAN ODT) tablet 4 mg  4 mg Oral Q6H PRN Robby Parcel, NP        Or    ondansetron [facility]) injection 4 mg  4 mg IntraVENous Q6H PRN Robby Parcel, NP        hydrALAZINE (APRESOLINE) 20 mg/mL injection 10 mg  10 mg IntraVENous Q4H PRN Robby Parcel, NP        famotidine (PEPCID) tablet 20 mg  20 mg Oral DAILY Robby Parcel, NP   20 mg at 05/26/22 0903    metoprolol tartrate (LOPRESSOR) tablet 50 mg  50 mg Oral Q12H Robby Parcel, NP   50 mg at 05/26/22 0902        No Known Allergies    Review of Systems:  A comprehensive review of systems was negative except for that written in the History of Present Illness. Objective:     Vitals:    05/25/22 2056 05/26/22 0000 05/26/22 0244 05/26/22 0901   BP: (!) 155/78  136/72 (!) 161/84   Pulse: 71 71 70 70   Resp: 19 19 19   Temp: 98.2 °F (36.8 °C)  98.5 °F (36.9 °C) 98.7 °F (37.1 °C)   SpO2: 98%  96% 98%   Weight:       Height:            Physical Exam:  General:  Alert, cooperative, no distress, appears stated age. Eyes:  Conjunctivae/corneas clear. PERRL, EOMs intact. Fundi benign   Ears:  Normal TMs and external ear canals both ears. Nose: Nares normal. Septum midline. Mucosa normal. No drainage or sinus tenderness. Mouth/Throat: Lips, mucosa, and tongue normal. Teeth and gums normal.   Neck: Supple, symmetrical, trachea midline, no adenopathy, thyroid: no enlargment/tenderness/nodules, no carotid bruit and no JVD. Back:   Symmetric, no curvature. ROM normal. No CVA tenderness. Lungs:   Clear to auscultation bilaterally. Heart:  Regular rate and rhythm, S1, S2 normal, no murmur, click, rub or gallop. Abdomen:   Soft, non-tender. Bowel sounds normal. No masses,  No organomegaly. Extremities: Extremities normal, atraumatic, no cyanosis or edema. Pulses: 2+ and symmetric all extremities. Skin: Skin color, texture, turgor normal. No rashes or lesions   Lymph nodes: Cervical, supraclavicular, and axillary nodes normal.   Neurologic: CNII-XII intact. Normal strength, sensation and reflexes throughout. Assessment:     Hospital Problems  Never Reviewed          Codes Class Noted POA    Accelerated hypertension ICD-10-CM: I10  ICD-9-CM: 401.0  5/24/2022 Unknown            Patient is a 78-year-old -American male with:  1. Chest pain, atypical for angina  2. Anemia  3. CKD  4. Medical noncompliance medications  5. Diabetes mellitus  6. Mild cerebellar tonsillar ectopia  7. Mild to moderate mitral regurgitation  8.   Moderate tricuspid regurgitation MPI  9. Small pericardial effusion    Plan:     I personally reviewed telemetry that showed normal sinus rhythm without arrhythmias. Hemoglobin was 9.1 upon admission and is down to 7.8. Platelet 217. Troponin was negative. A1c was 7.1  Sodium 142, potassium 4.8. Creatinine is 4.24 and BUN of 56  CT head was nonacute. Mild cerebellar tonsillar ectopia noted. Brain MRI showed minimal subcortical white matter change. No acute intracranial changes. Echo showed EF of 55 to 60% with grade 1 diastolic dysfunction, mild to moderate MR with moderate TR, moderate PA. Small pericardial effusion    Echo features are suggestive of possible cardiac amyloidosis. Consider outpatient cardiac MRI  He was seen by neurologist.    Plan:  1. Patient counseled regarding the importance of being compliant with medications  2. Outpatient cardiac MRI for further evaluation of cardiac amyloidosis  3. Currently on amlodipine 10 mg, chlorthalidone 25 mg daily, Lasix 40 mg daily, metoprolol 50 mg twice a day, minoxidil  4. We will stop metoprolol and replace that with carvedilol for better blood pressure management  5. We will stop chlorthalidone since he is on Lasix  6. Consider ischemic evaluation as an outpatient    Thank you  For involving me in Mr. Chavis's care. I will follow.     Signed By: Kayleigh Blanc MD     May 26, 2022

## 2022-05-26 NOTE — PROGRESS NOTES
I have reviewed discharge instructions withMr. Chavis. Nurse removed IV access, reviewed medication list, and follow up appointments with the patient. Patient transportation provided by family member in there personal vehicle. Patient is being discharge to home to home self care per doctor's order. Patient understood and verbalized understanding of all discharge orders. Discharge plan of care/case management plan validated with provider discharge order.

## 2022-05-27 NOTE — PROGRESS NOTES
Physician Progress Note      PATIENT:               Kota Patel  CSN #:                  [id number]  :                       1965  ADMIT DATE:       2022 12:52 PM  Italia Harden DATE:        2022 3:49 PM  RESPONDING  PROVIDER #:        Matt GARCIA          QUERY TEXT:    Dear Mina Buchanan -    Pt admitted with HTN urgency, near syncope and has acute encephalopathy documented. If possible, please document in progress notes and discharge summary further specificity regarding the type of encephalopathy:    The medical record reflects the following:  Risk Factors: 60 M arrived to ED via EMS from medical office with chest pain, syncope, AMS, HTN, CKD4  Clinical Indicators: per ED and Attending documentation, patient with AMS; Neurology consult notes \"acute encephalopathy\"; patient with HTN urgency, medication non-compliance noted; Neurology notes patient with vasovagal event; ED MD noted patient as lethargic / \"attempts to answer questions but seemed to clearly disoriented\"  Treatment: labs, CT head, Cardiology consult, Neurology consult, anti-HTN meds    Thank you,  RADHA Wright, RN, [facility]  Clinical   [email or web address] or contact via [employer]  Options provided:  -- Hypertensive encephalopathy  -- Metabolic encephalopathy  -- Toxic encephalopathy  -- Drug-induced encephalopathy due to, Please specify substance. -- Drug-induced encephalopathy, substance(s) unknown  -- Toxic metabolic encephalopathy  -- Wernicke?s encephalopathy  -- Other - I will add my own diagnosis  -- Disagree - Not applicable / Not valid  -- Disagree - Clinically unable to determine / Unknown  -- Refer to Clinical Documentation Reviewer    PROVIDER RESPONSE TEXT:    This patient has hypertensive encephalopathy.     Query created by: Lynne Dugan on 2022 10:37 AM      Electronically signed by:  Matt GARCIA 2022 12:41 PM

## 2022-05-31 LAB
BACTERIA SPEC CULT: NORMAL
SPECIAL REQUESTS,SREQ: NORMAL

## 2022-06-26 ENCOUNTER — APPOINTMENT (OUTPATIENT)
Dept: GENERAL RADIOLOGY | Age: 57
DRG: 194 | End: 2022-06-26
Attending: EMERGENCY MEDICINE
Payer: MEDICAID

## 2022-06-26 ENCOUNTER — HOSPITAL ENCOUNTER (INPATIENT)
Age: 57
LOS: 1 days | Discharge: HOME | DRG: 194 | End: 2022-06-28
Attending: EMERGENCY MEDICINE | Admitting: HOSPITALIST
Payer: MEDICAID

## 2022-06-26 DIAGNOSIS — I20.8 STABLE ANGINA PECTORIS (HCC): Primary | ICD-10-CM

## 2022-06-26 PROBLEM — R07.9 CHEST PAIN: Status: ACTIVE | Noted: 2022-06-26

## 2022-06-26 LAB
ALBUMIN SERPL-MCNC: 3.5 G/DL (ref 3.5–5)
ALBUMIN/GLOB SERPL: 0.9 {RATIO} (ref 1.1–2.2)
ALP SERPL-CCNC: 122 U/L (ref 45–117)
ALT SERPL-CCNC: 44 U/L (ref 12–78)
ANION GAP SERPL CALC-SCNC: 9 MMOL/L (ref 5–15)
AST SERPL W P-5'-P-CCNC: 26 U/L (ref 15–37)
BASOPHILS # BLD: 0 K/UL (ref 0–0.2)
BASOPHILS NFR BLD: 1 % (ref 0–2.5)
BILIRUB SERPL-MCNC: 0.4 MG/DL (ref 0.2–1)
BNP SERPL-MCNC: 2364 PG/ML
BUN SERPL-MCNC: 40 MG/DL (ref 6–20)
BUN/CREAT SERPL: 9 (ref 12–20)
CA-I BLD-MCNC: 8.4 MG/DL (ref 8.5–10.1)
CHLORIDE SERPL-SCNC: 109 MMOL/L (ref 97–108)
CO2 SERPL-SCNC: 23 MMOL/L (ref 21–32)
CREAT SERPL-MCNC: 4.55 MG/DL (ref 0.7–1.3)
EOSINOPHIL # BLD: 0.1 K/UL (ref 0–0.7)
EOSINOPHIL NFR BLD: 3 % (ref 0.9–2.9)
ERYTHROCYTE [DISTWIDTH] IN BLOOD BY AUTOMATED COUNT: 14.2 % (ref 11.5–14.5)
GLOBULIN SER CALC-MCNC: 4.1 G/DL (ref 2–4)
GLUCOSE SERPL-MCNC: 92 MG/DL (ref 65–100)
HCT VFR BLD AUTO: 27.1 % (ref 41–53)
HGB BLD-MCNC: 8.9 G/DL (ref 13.5–17.5)
LYMPHOCYTES # BLD: 0.9 K/UL (ref 1–4.8)
LYMPHOCYTES NFR BLD: 18 % (ref 20.5–51.1)
MCH RBC QN AUTO: 28.7 PG (ref 31–34)
MCHC RBC AUTO-ENTMCNC: 32.8 G/DL (ref 31–36)
MCV RBC AUTO: 87.4 FL (ref 80–100)
MONOCYTES # BLD: 0.5 K/UL (ref 0.2–2.4)
MONOCYTES NFR BLD: 11 % (ref 1.7–9.3)
NEUTS SEG # BLD: 3.2 K/UL (ref 1.8–7.7)
NEUTS SEG NFR BLD: 67 % (ref 42–75)
NRBC # BLD: 0 K/UL
NRBC BLD-RTO: 0.1 PER 100 WBC
PLATELET # BLD AUTO: 161 K/UL (ref 150–400)
PMV BLD AUTO: 8.5 FL (ref 6.5–11.5)
POTASSIUM SERPL-SCNC: 5.2 MMOL/L (ref 3.5–5.1)
PROT SERPL-MCNC: 7.6 G/DL (ref 6.4–8.2)
RBC # BLD AUTO: 3.1 M/UL (ref 4.5–5.9)
SODIUM SERPL-SCNC: 141 MMOL/L (ref 136–145)
TROPONIN-HIGH SENSITIVITY: 36 NG/L (ref 0–76)
TROPONIN-HIGH SENSITIVITY: 38 NG/L (ref 0–76)
WBC # BLD AUTO: 4.8 K/UL (ref 4.4–11.3)

## 2022-06-26 PROCEDURE — 99285 EMERGENCY DEPT VISIT HI MDM: CPT

## 2022-06-26 PROCEDURE — 85025 COMPLETE CBC W/AUTO DIFF WBC: CPT

## 2022-06-26 PROCEDURE — 74011250637 HC RX REV CODE- 250/637: Performed by: EMERGENCY MEDICINE

## 2022-06-26 PROCEDURE — 96374 THER/PROPH/DIAG INJ IV PUSH: CPT

## 2022-06-26 PROCEDURE — 80053 COMPREHEN METABOLIC PANEL: CPT

## 2022-06-26 PROCEDURE — G0378 HOSPITAL OBSERVATION PER HR: HCPCS

## 2022-06-26 PROCEDURE — 36415 COLL VENOUS BLD VENIPUNCTURE: CPT

## 2022-06-26 PROCEDURE — 74011000250 HC RX REV CODE- 250: Performed by: EMERGENCY MEDICINE

## 2022-06-26 PROCEDURE — 93005 ELECTROCARDIOGRAM TRACING: CPT

## 2022-06-26 PROCEDURE — 84484 ASSAY OF TROPONIN QUANT: CPT

## 2022-06-26 PROCEDURE — 74011250636 HC RX REV CODE- 250/636: Performed by: EMERGENCY MEDICINE

## 2022-06-26 PROCEDURE — 71045 X-RAY EXAM CHEST 1 VIEW: CPT

## 2022-06-26 PROCEDURE — 83880 ASSAY OF NATRIURETIC PEPTIDE: CPT

## 2022-06-26 RX ORDER — ACETAMINOPHEN 325 MG/1
650 TABLET ORAL
Status: DISCONTINUED | OUTPATIENT
Start: 2022-06-26 | End: 2022-06-28 | Stop reason: HOSPADM

## 2022-06-26 RX ORDER — ENOXAPARIN SODIUM 100 MG/ML
30 INJECTION SUBCUTANEOUS DAILY
Status: DISCONTINUED | OUTPATIENT
Start: 2022-06-27 | End: 2022-06-28 | Stop reason: HOSPADM

## 2022-06-26 RX ORDER — ONDANSETRON 4 MG/1
4 TABLET, ORALLY DISINTEGRATING ORAL
Status: DISCONTINUED | OUTPATIENT
Start: 2022-06-26 | End: 2022-06-28 | Stop reason: HOSPADM

## 2022-06-26 RX ORDER — FUROSEMIDE 10 MG/ML
20 INJECTION INTRAMUSCULAR; INTRAVENOUS
Status: COMPLETED | OUTPATIENT
Start: 2022-06-26 | End: 2022-06-26

## 2022-06-26 RX ORDER — POLYETHYLENE GLYCOL 3350 17 G/17G
17 POWDER, FOR SOLUTION ORAL DAILY PRN
Status: DISCONTINUED | OUTPATIENT
Start: 2022-06-26 | End: 2022-06-28 | Stop reason: HOSPADM

## 2022-06-26 RX ORDER — SODIUM CHLORIDE 0.9 % (FLUSH) 0.9 %
5-40 SYRINGE (ML) INJECTION AS NEEDED
Status: DISCONTINUED | OUTPATIENT
Start: 2022-06-26 | End: 2022-06-28 | Stop reason: HOSPADM

## 2022-06-26 RX ORDER — SODIUM CHLORIDE 0.9 % (FLUSH) 0.9 %
5-40 SYRINGE (ML) INJECTION EVERY 8 HOURS
Status: DISCONTINUED | OUTPATIENT
Start: 2022-06-26 | End: 2022-06-28 | Stop reason: HOSPADM

## 2022-06-26 RX ORDER — ONDANSETRON 2 MG/ML
4 INJECTION INTRAMUSCULAR; INTRAVENOUS
Status: DISCONTINUED | OUTPATIENT
Start: 2022-06-26 | End: 2022-06-28 | Stop reason: HOSPADM

## 2022-06-26 RX ORDER — ACETAMINOPHEN 650 MG/1
650 SUPPOSITORY RECTAL
Status: DISCONTINUED | OUTPATIENT
Start: 2022-06-26 | End: 2022-06-28 | Stop reason: HOSPADM

## 2022-06-26 RX ADMIN — NITROGLYCERIN 1 INCH: 20 OINTMENT TOPICAL at 19:11

## 2022-06-26 RX ADMIN — SODIUM CHLORIDE, PRESERVATIVE FREE 10 ML: 5 INJECTION INTRAVENOUS at 22:32

## 2022-06-26 RX ADMIN — FUROSEMIDE 20 MG: 10 INJECTION, SOLUTION INTRAMUSCULAR; INTRAVENOUS at 19:11

## 2022-06-26 NOTE — ED PROVIDER NOTES
HPI   Patient reports approximately 1 week of subjective dyspnea and left chest tightness shortly before arrival.  He describes the dyspnea is intermittent, without modifying factors. No sputum production, weight gain/edema, constitutional symptoms. Chest pain described as a tightness in the left chest without radiation or modifying factors. Patient has no known history of CAD. Past Medical History:   Diagnosis Date    Chronic kidney disease     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     Diabetes ([address]     Hypertension     Lumbar spondylosis        No past surgical history on file. Family History:   Problem Relation Age of Onset    Hypotension Mother     Hypertension Father        Social History     Socioeconomic History    Marital status: SINGLE     Spouse name: Not on file    Number of children: Not on file    Years of education: Not on file    Highest education level: Not on file   Occupational History    Not on file   Tobacco Use    Smoking status: Never Smoker    Smokeless tobacco: Never Used   Substance and Sexual Activity    Alcohol use: Not Currently    Drug use: Not on file    Sexual activity: Not on file   Other Topics Concern    Not on file   Social History Narrative    Not on file     Social Determinants of Health     Financial Resource Strain:     Difficulty of Paying Living Expenses: Not on file   Food Insecurity:     Worried About Running Out of Food in the Last Year: Not on file    Francesca of Food in the Last Year: Not on file   Transportation Needs:     Lack of Transportation (Medical): Not on file    Lack of Transportation (Non-Medical):  Not on file   Physical Activity:     Days of Exercise per Week: Not on file    Minutes of Exercise per Session: Not on file   Stress:     Feeling of Stress : Not on file   Social Connections:     Frequency of Communication with Friends and Family: Not on file    Frequency of Social Gatherings with Friends and Family: Not on file    Attends Faith Services: Not on file    Active Member of Clubs or Organizations: Not on file    Attends Club or Organization Meetings: Not on file    Marital Status: Not on file   Intimate Partner Violence:     Fear of Current or Ex-Partner: Not on file    Emotionally Abused: Not on file    Physically Abused: Not on file    Sexually Abused: Not on file   Housing Stability:     Unable to Pay for Housing in the Last Year: Not on file    Number of [place] in the Last Year: Not on file    Unstable Housing in the Last Year: Not on file         ALLERGIES: Patient has no known allergies. Review of Systems   Constitutional: Negative. HENT: Negative. Eyes: Negative. Respiratory: Positive for shortness of breath. Cardiovascular: Positive for chest pain. Gastrointestinal: Negative. Endocrine: Negative. Genitourinary: Negative. Musculoskeletal: Negative. Allergic/Immunologic: Negative. Neurological: Negative. Hematological: Negative. Psychiatric/Behavioral: Negative. All other systems reviewed and are negative. Vitals:    06/26/22 1710   BP: (!) 143/68   Pulse: 70   Resp: 18   Temp: 98.5 °F (36.9 °C)   SpO2: 97%   Weight: 77.1 kg (170 lb)   Height: 6' (1.829 m)            Physical Exam  Vitals and nursing note reviewed. Constitutional:       General: He is not in acute distress. Appearance: Normal appearance. He is normal weight. He is not ill-appearing, toxic-appearing or diaphoretic. HENT:      Head: Normocephalic and atraumatic. Nose: Nose normal.      Mouth/Throat:      Mouth: Mucous membranes are moist.      Pharynx: Oropharynx is clear. Eyes:      Extraocular Movements: Extraocular movements intact. Conjunctiva/sclera: Conjunctivae normal.      Pupils: Pupils are equal, round, and reactive to light. Cardiovascular:      Rate and Rhythm: Normal rate and regular rhythm. Pulses: Normal pulses. Heart sounds: Murmur heard. No friction rub. No gallop. Comments: IV/VI pan systolic murmur  Pulmonary:      Effort: Pulmonary effort is normal. No respiratory distress. Breath sounds: Normal breath sounds. No stridor. No wheezing, rhonchi or rales. Chest:      Chest wall: No tenderness. Abdominal:      General: Abdomen is flat. There is no distension. Palpations: Abdomen is soft. Musculoskeletal:         General: No swelling, tenderness, deformity or signs of injury. Normal range of motion. Cervical back: Normal range of motion. Right lower leg: No edema. Left lower leg: No edema. Skin:     General: Skin is warm and dry. Capillary Refill: Capillary refill takes less than 2 seconds. Coloration: Skin is not jaundiced or pale. Findings: No bruising, erythema, lesion or rash. Neurological:      General: No focal deficit present. Mental Status: He is alert and oriented to person, place, and time. Mental status is at baseline. Cranial Nerves: No cranial nerve deficit. Sensory: No sensory deficit. Motor: No weakness. Coordination: Coordination normal.      Gait: Gait normal.   Psychiatric:         Mood and Affect: Mood normal.         Behavior: Behavior normal.          MDM     Pt has low HEART score, and ACS not likely. Similarly, presentation not suggestive of PE. Will check cardiac biomarkers. Likely d/c. Pt now reports that he has been experiencing BLE and abdominal edema as well as othopnea \"for a while\" meaning several months. CXR with CM and vascular congestion, BP elevated. Will diurese, Rx nitro and admit.   Procedures

## 2022-06-27 ENCOUNTER — APPOINTMENT (OUTPATIENT)
Dept: VASCULAR SURGERY | Age: 57
DRG: 194 | End: 2022-06-27
Attending: NURSE PRACTITIONER
Payer: MEDICAID

## 2022-06-27 PROBLEM — N18.30 CKD (CHRONIC KIDNEY DISEASE) STAGE 3, GFR 30-59 ML/MIN (HCC): Status: ACTIVE | Noted: 2022-06-27

## 2022-06-27 PROBLEM — E87.5 HYPERKALEMIA: Status: ACTIVE | Noted: 2022-06-27

## 2022-06-27 PROBLEM — I10 HTN (HYPERTENSION): Status: ACTIVE | Noted: 2022-06-27

## 2022-06-27 PROBLEM — N18.4 CKD (CHRONIC KIDNEY DISEASE) STAGE 4, GFR 15-29 ML/MIN (HCC): Status: ACTIVE | Noted: 2022-06-27

## 2022-06-27 PROBLEM — R06.00 DYSPNEA: Status: ACTIVE | Noted: 2022-06-27

## 2022-06-27 PROBLEM — N17.9 AKI (ACUTE KIDNEY INJURY) (HCC): Status: ACTIVE | Noted: 2022-06-27

## 2022-06-27 LAB
ALBUMIN SERPL-MCNC: 3.2 G/DL (ref 3.5–5)
AMPHET UR QL SCN: NEGATIVE
ANION GAP SERPL CALC-SCNC: 11 MMOL/L (ref 5–15)
APPEARANCE UR: CLEAR
ATRIAL RATE: 71 BPM
BACTERIA URNS QL MICRO: NEGATIVE /HPF
BARBITURATES UR QL SCN: NEGATIVE
BASOPHILS # BLD: 0 K/UL (ref 0–0.2)
BASOPHILS NFR BLD: 1 % (ref 0–2.5)
BENZODIAZ UR QL: NEGATIVE
BILIRUB UR QL: NEGATIVE
BUN SERPL-MCNC: 42 MG/DL (ref 6–20)
BUN/CREAT SERPL: 9 (ref 12–20)
CA-I BLD-MCNC: 8.5 MG/DL (ref 8.5–10.1)
CALCULATED P AXIS, ECG09: 61 DEGREES
CALCULATED R AXIS, ECG10: 40 DEGREES
CALCULATED T AXIS, ECG11: 59 DEGREES
CANNABINOIDS UR QL SCN: NEGATIVE
CHLORIDE SERPL-SCNC: 111 MMOL/L (ref 97–108)
CO2 SERPL-SCNC: 23 MMOL/L (ref 21–32)
COCAINE UR QL SCN: NEGATIVE
COLOR UR: ABNORMAL
CREAT SERPL-MCNC: 4.46 MG/DL (ref 0.7–1.3)
CREAT UR-MCNC: 68.55 MG/DL
DIAGNOSIS, 93000: NORMAL
DRUG SCRN COMMENT,DRGCM: NORMAL
ECSTASY, ECST: NEGATIVE
EOSINOPHIL # BLD: 0.2 K/UL (ref 0–0.7)
EOSINOPHIL NFR BLD: 4 % (ref 0.9–2.9)
ERYTHROCYTE [DISTWIDTH] IN BLOOD BY AUTOMATED COUNT: 14.2 % (ref 11.5–14.5)
GLUCOSE BLD STRIP.AUTO-MCNC: 117 MG/DL (ref 65–117)
GLUCOSE BLD STRIP.AUTO-MCNC: 130 MG/DL (ref 65–117)
GLUCOSE BLD STRIP.AUTO-MCNC: 162 MG/DL (ref 65–117)
GLUCOSE BLD STRIP.AUTO-MCNC: 77 MG/DL (ref 65–117)
GLUCOSE SERPL-MCNC: 67 MG/DL (ref 65–100)
GLUCOSE UR STRIP.AUTO-MCNC: NEGATIVE MG/DL
HCT VFR BLD AUTO: 26 % (ref 41–53)
HGB BLD-MCNC: 8.5 G/DL (ref 13.5–17.5)
HGB UR QL STRIP: ABNORMAL
KETONES UR QL STRIP.AUTO: NEGATIVE MG/DL
LEUKOCYTE ESTERASE UR QL STRIP.AUTO: ABNORMAL
LYMPHOCYTES # BLD: 1.1 K/UL (ref 1–4.8)
LYMPHOCYTES NFR BLD: 25 % (ref 20.5–51.1)
MAGNESIUM SERPL-MCNC: 1.7 MG/DL (ref 1.6–2.4)
MCH RBC QN AUTO: 28.4 PG (ref 31–34)
MCHC RBC AUTO-ENTMCNC: 32.9 G/DL (ref 31–36)
MCV RBC AUTO: 86.3 FL (ref 80–100)
METHADONE UR QL: NEGATIVE
MONOCYTES # BLD: 0.6 K/UL (ref 0.2–2.4)
MONOCYTES NFR BLD: 14 % (ref 1.7–9.3)
NEUTS SEG # BLD: 2.5 K/UL (ref 1.8–7.7)
NEUTS SEG NFR BLD: 56 % (ref 42–75)
NITRITE UR QL STRIP.AUTO: NEGATIVE
NRBC # BLD: 0.01 K/UL
NRBC BLD-RTO: 0.3 PER 100 WBC
OPIATES UR QL: NEGATIVE
P-R INTERVAL, ECG05: 223 MS
PCP UR QL: NEGATIVE
PERFORMED BY, TECHID: ABNORMAL
PERFORMED BY, TECHID: ABNORMAL
PERFORMED BY, TECHID: NORMAL
PERFORMED BY, TECHID: NORMAL
PH UR STRIP: 7 [PH] (ref 5–8)
PHOSPHATE SERPL-MCNC: 4.5 MG/DL (ref 2.6–4.7)
PLATELET # BLD AUTO: 145 K/UL (ref 150–400)
PMV BLD AUTO: 8 FL (ref 6.5–11.5)
POTASSIUM SERPL-SCNC: 5.2 MMOL/L (ref 3.5–5.1)
PROT UR STRIP-MCNC: 100 MG/DL
PROT UR-MCNC: 104 MG/DL (ref 0–11.9)
PROT/CREAT UR-RTO: 1.5
Q-T INTERVAL, ECG07: 388 MS
QRS DURATION, ECG06: 86 MS
QTC CALCULATION (BEZET), ECG08: 422 MS
RBC # BLD AUTO: 3.01 M/UL (ref 4.5–5.9)
RBC #/AREA URNS HPF: ABNORMAL /HPF (ref 0–3)
SODIUM SERPL-SCNC: 145 MMOL/L (ref 136–145)
SP GR UR REFRACTOMETRY: 1.01 (ref 1–1.03)
TROPONIN-HIGH SENSITIVITY: 55 NG/L (ref 0–76)
TSH SERPL DL<=0.05 MIU/L-ACNC: 3.37 UIU/ML (ref 0.36–3.74)
UA: UC IF INDICATED,UAUC: ABNORMAL
UROBILINOGEN UR QL STRIP.AUTO: 0.2 EU/DL (ref 0.2–1)
VENTRICULAR RATE, ECG03: 71 BPM
WBC # BLD AUTO: 4.5 K/UL (ref 4.4–11.3)
WBC URNS QL MICRO: ABNORMAL /HPF (ref 0–5)

## 2022-06-27 PROCEDURE — 82570 ASSAY OF URINE CREATININE: CPT

## 2022-06-27 PROCEDURE — 83735 ASSAY OF MAGNESIUM: CPT

## 2022-06-27 PROCEDURE — 82962 GLUCOSE BLOOD TEST: CPT

## 2022-06-27 PROCEDURE — 80061 LIPID PANEL: CPT

## 2022-06-27 PROCEDURE — 65270000029 HC RM PRIVATE

## 2022-06-27 PROCEDURE — 83540 ASSAY OF IRON: CPT

## 2022-06-27 PROCEDURE — 96366 THER/PROPH/DIAG IV INF ADDON: CPT

## 2022-06-27 PROCEDURE — G0378 HOSPITAL OBSERVATION PER HR: HCPCS

## 2022-06-27 PROCEDURE — 85025 COMPLETE CBC W/AUTO DIFF WBC: CPT

## 2022-06-27 PROCEDURE — 96365 THER/PROPH/DIAG IV INF INIT: CPT

## 2022-06-27 PROCEDURE — 96372 THER/PROPH/DIAG INJ SC/IM: CPT

## 2022-06-27 PROCEDURE — 74011000250 HC RX REV CODE- 250: Performed by: EMERGENCY MEDICINE

## 2022-06-27 PROCEDURE — 84443 ASSAY THYROID STIM HORMONE: CPT

## 2022-06-27 PROCEDURE — 74011250636 HC RX REV CODE- 250/636: Performed by: EMERGENCY MEDICINE

## 2022-06-27 PROCEDURE — 84484 ASSAY OF TROPONIN QUANT: CPT

## 2022-06-27 PROCEDURE — 93308 TTE F-UP OR LMTD: CPT

## 2022-06-27 PROCEDURE — 81001 URINALYSIS AUTO W/SCOPE: CPT

## 2022-06-27 PROCEDURE — 74011250637 HC RX REV CODE- 250/637: Performed by: HOSPITALIST

## 2022-06-27 PROCEDURE — 80307 DRUG TEST PRSMV CHEM ANLYZR: CPT

## 2022-06-27 PROCEDURE — 80069 RENAL FUNCTION PANEL: CPT

## 2022-06-27 PROCEDURE — 74011250636 HC RX REV CODE- 250/636: Performed by: HOSPITALIST

## 2022-06-27 RX ORDER — ISOSORBIDE MONONITRATE 20 MG/1
20 TABLET ORAL 2 TIMES DAILY
Status: DISCONTINUED | OUTPATIENT
Start: 2022-06-27 | End: 2022-06-28 | Stop reason: HOSPADM

## 2022-06-27 RX ORDER — HYDRALAZINE HYDROCHLORIDE 25 MG/1
50 TABLET, FILM COATED ORAL 3 TIMES DAILY
Status: DISCONTINUED | OUTPATIENT
Start: 2022-06-27 | End: 2022-06-27

## 2022-06-27 RX ORDER — GLIPIZIDE 5 MG/1
5 TABLET ORAL 2 TIMES DAILY
Status: DISCONTINUED | OUTPATIENT
Start: 2022-06-27 | End: 2022-06-27

## 2022-06-27 RX ORDER — ALOGLIPTIN 6.25 MG/1
6.25 TABLET, FILM COATED ORAL DAILY
Status: DISCONTINUED | OUTPATIENT
Start: 2022-06-28 | End: 2022-06-28 | Stop reason: HOSPADM

## 2022-06-27 RX ORDER — DEXTROSE 50 % IN WATER (D50W) INTRAVENOUS SYRINGE
25-50 AS NEEDED
Status: DISCONTINUED | OUTPATIENT
Start: 2022-06-27 | End: 2022-06-28 | Stop reason: HOSPADM

## 2022-06-27 RX ORDER — SODIUM BICARBONATE 650 MG/1
1300 TABLET ORAL 3 TIMES DAILY
Status: DISCONTINUED | OUTPATIENT
Start: 2022-06-27 | End: 2022-06-28 | Stop reason: HOSPADM

## 2022-06-27 RX ORDER — MAGNESIUM SULFATE 100 %
4 CRYSTALS MISCELLANEOUS AS NEEDED
Status: DISCONTINUED | OUTPATIENT
Start: 2022-06-27 | End: 2022-06-28 | Stop reason: HOSPADM

## 2022-06-27 RX ORDER — ATORVASTATIN CALCIUM 40 MG/1
40 TABLET, FILM COATED ORAL DAILY
Status: DISCONTINUED | OUTPATIENT
Start: 2022-06-28 | End: 2022-06-28 | Stop reason: HOSPADM

## 2022-06-27 RX ORDER — HYDRALAZINE HYDROCHLORIDE 100 MG/1
100 TABLET, FILM COATED ORAL 3 TIMES DAILY
Status: DISCONTINUED | OUTPATIENT
Start: 2022-06-27 | End: 2022-06-28 | Stop reason: HOSPADM

## 2022-06-27 RX ORDER — FUROSEMIDE 10 MG/ML
40 INJECTION INTRAMUSCULAR; INTRAVENOUS
Status: DISCONTINUED | OUTPATIENT
Start: 2022-06-27 | End: 2022-06-28 | Stop reason: HOSPADM

## 2022-06-27 RX ORDER — MAGNESIUM SULFATE HEPTAHYDRATE 40 MG/ML
2 INJECTION, SOLUTION INTRAVENOUS ONCE
Status: COMPLETED | OUTPATIENT
Start: 2022-06-27 | End: 2022-06-27

## 2022-06-27 RX ORDER — LANOLIN ALCOHOL/MO/W.PET/CERES
325 CREAM (GRAM) TOPICAL 2 TIMES DAILY WITH MEALS
Status: DISCONTINUED | OUTPATIENT
Start: 2022-06-27 | End: 2022-06-28 | Stop reason: HOSPADM

## 2022-06-27 RX ORDER — AMLODIPINE BESYLATE 10 MG/1
10 TABLET ORAL DAILY
Status: DISCONTINUED | OUTPATIENT
Start: 2022-06-28 | End: 2022-06-28 | Stop reason: HOSPADM

## 2022-06-27 RX ORDER — CARVEDILOL 12.5 MG/1
25 TABLET ORAL 2 TIMES DAILY WITH MEALS
Status: DISCONTINUED | OUTPATIENT
Start: 2022-06-27 | End: 2022-06-28 | Stop reason: HOSPADM

## 2022-06-27 RX ORDER — GLIPIZIDE 5 MG/1
5 TABLET ORAL 2 TIMES DAILY WITH MEALS
Status: DISCONTINUED | OUTPATIENT
Start: 2022-06-27 | End: 2022-06-28 | Stop reason: HOSPADM

## 2022-06-27 RX ORDER — INSULIN LISPRO 100 [IU]/ML
INJECTION, SOLUTION INTRAVENOUS; SUBCUTANEOUS
Status: DISCONTINUED | OUTPATIENT
Start: 2022-06-27 | End: 2022-06-28 | Stop reason: HOSPADM

## 2022-06-27 RX ADMIN — CARVEDILOL 25 MG: 12.5 TABLET, FILM COATED ORAL at 16:50

## 2022-06-27 RX ADMIN — SODIUM BICARBONATE 1300 MG: 650 TABLET ORAL at 21:24

## 2022-06-27 RX ADMIN — ENOXAPARIN SODIUM 30 MG: 100 INJECTION SUBCUTANEOUS at 09:09

## 2022-06-27 RX ADMIN — HYDRALAZINE HYDROCHLORIDE 100 MG: 100 TABLET, FILM COATED ORAL at 16:50

## 2022-06-27 RX ADMIN — SODIUM CHLORIDE, PRESERVATIVE FREE 10 ML: 5 INJECTION INTRAVENOUS at 06:16

## 2022-06-27 RX ADMIN — GLIPIZIDE 5 MG: 5 TABLET ORAL at 16:50

## 2022-06-27 RX ADMIN — HYDRALAZINE HYDROCHLORIDE 100 MG: 100 TABLET, FILM COATED ORAL at 21:25

## 2022-06-27 RX ADMIN — SODIUM CHLORIDE, PRESERVATIVE FREE 10 ML: 5 INJECTION INTRAVENOUS at 21:29

## 2022-06-27 RX ADMIN — FUROSEMIDE 40 MG: 10 INJECTION, SOLUTION INTRAMUSCULAR; INTRAVENOUS at 16:49

## 2022-06-27 RX ADMIN — SODIUM CHLORIDE, PRESERVATIVE FREE 10 ML: 5 INJECTION INTRAVENOUS at 13:31

## 2022-06-27 RX ADMIN — SODIUM BICARBONATE 1300 MG: 650 TABLET ORAL at 16:50

## 2022-06-27 RX ADMIN — MAGNESIUM SULFATE HEPTAHYDRATE 2 G: 40 INJECTION, SOLUTION INTRAVENOUS at 11:04

## 2022-06-27 RX ADMIN — FERROUS SULFATE TAB 325 MG (65 MG ELEMENTAL FE) 325 MG: 325 (65 FE) TAB at 16:50

## 2022-06-27 NOTE — ROUTINE PROCESS
The pt was received from the ED via W/C. He was able to transfer himself to the bed. He does c/o of discomfort to his scrotal area with movement. He is noted to have some edema to his lower ext. He was oriented to his surroundings. The monitor was applied. Rt admission was initiated. Pt states he have swelling to his scrotum also.

## 2022-06-27 NOTE — PROGRESS NOTES
Problem: Falls - Risk of  Goal: *Absence of Falls  Description: Document [facility] Mishel Fall Risk and appropriate interventions in the flowsheet.   Outcome: Progressing Towards Goal  Note: Fall Risk Interventions:  Mobility Interventions: Patient to call before getting OOB         Medication Interventions: Patient to call before getting OOB,Teach patient to arise slowly         History of Falls Interventions: Door open when patient unattended 07-Jul-2020 13:16

## 2022-06-27 NOTE — CONSULTS
Consult Date: 6/27/2022    IP CONSULT TO NEPHROLOGY  Consult performed by: Ayana Baker MD  Consult ordered by: Dariusz Johansen MD          Subjective   HISTORY OF PRESENTING ILLNESS   Patient is a 68-year-old -American male with history of CKD stage IV, hypertension-difficult to control, diabetes mellitus, who is admitted because of worsening shortness of breath which developed over the last 2 weeks. He had accompanying leg swelling as well. He states that he ran out of some of his blood pressure medications. He has been watching his diet diligently though he emphasizes. He has been treated with IV Lasix and is reportedly doing much better. His leg swelling has gone down and he is not short of breath at res. t he states he has been urinating quite frequently as result of the Lasix. Patient follows up with me in my CKD clinic at [place]. Last serum creatinine was 3.90. he has a tendency towards hyperkalemia. He was started on minoxidil by me outpatient. Also he needs to be on intermittent Lasix. Past Medical History:   Diagnosis Date    Chronic kidney disease     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     Diabetes ([address]     Hypertension     Lumbar spondylosis       No past surgical history on file.   Family History   Problem Relation Age of Onset    Hypotension Mother     Hypertension Father       Social History     Tobacco Use    Smoking status: Never Smoker    Smokeless tobacco: Never Used   Substance Use Topics    Alcohol use: Not Currently       Current Facility-Administered Medications   Medication Dose Route Frequency Provider Last Rate Last Admin    glucose chewable tablet 16 g  4 Tablet Oral PRN Mohinder Vines MD        dextrose (D50W) injection syrg 12.5-25 g  25-50 mL IntraVENous PRN Beverley Vines MD        glucagon (GLUCAGEN) injection 1 mg  1 mg IntraMUSCular PRN Beverley Vines MD        insulin lispro (HUMALOG) injection   SubCUTAneous AC&HS Taran Concepcion MD        sodium chloride (NS) flush 5-40 mL  5-40 mL IntraVENous Q8H Dharmesh Baird MD   10 mL at 06/27/22 0616    sodium chloride (NS) flush 5-40 mL  5-40 mL IntraVENous PRN Dharmesh Baird MD        acetaminophen (TYLENOL) tablet 650 mg  650 mg Oral Q6H PRN Dharmesh Baird MD        Or    acetaminophen (TYLENOL) suppository 650 mg  650 mg Rectal Q6H PRN Dharmesh Baird MD        polyethylene glycol (MIRALAX) packet 17 g  17 g Oral DAILY PRN Dharmesh Baird MD        ondansetron (ZOFRAN ODT) tablet 4 mg  4 mg Oral Q8H PRN Dharmesh Baird MD        Or    ondansetron [facility]) injection 4 mg  4 mg IntraVENous Q6H PRN Dharmesh Baird MD        enoxaparin (LOVENOX) injection 30 mg  30 mg SubCUTAneous DAILY Dharmesh Baird MD   30 mg at 06/27/22 4901        Review of Systems   Constitutional: Negative for activity change, appetite change, diaphoresis, fatigue, fever and unexpected weight change. HENT: Negative for dental problem, ear discharge, ear pain, hearing loss, mouth sores and nosebleeds. Eyes: Negative for pain, discharge and redness. Respiratory: Positive for shortness of breath. Negative for cough, choking and chest tightness. Cardiovascular: Positive for leg swelling. Negative for chest pain and palpitations. Gastrointestinal: Negative for abdominal distention, abdominal pain, blood in stool, constipation, diarrhea, nausea and vomiting. Endocrine: Negative for cold intolerance and heat intolerance. Genitourinary: Positive for decreased urine volume. Negative for dysuria. Musculoskeletal: Negative for arthralgias, back pain and joint swelling. Skin: Negative for color change and pallor. Allergic/Immunologic: Negative for environmental allergies, food allergies and immunocompromised state. Neurological: Negative for dizziness, tremors, syncope and speech difficulty.    Psychiatric/Behavioral: Negative for agitation, confusion, decreased concentration and dysphoric mood. All other systems reviewed and are negative. Objective     Vital signs for last 24 hours:  Visit Vitals  BP (!) 163/70 (BP 1 Location: Left upper arm, BP Patient Position: At rest)   Pulse 75   Temp 97.3 °F (36.3 °C)   Resp 20   Ht 6' (1.829 m)   Wt 78.2 kg (172 lb 8 oz)   SpO2 92%   BMI 23.40 kg/m²           Recent Results (from the past 24 hour(s))   EKG, 12 LEAD, INITIAL    Collection Time: 06/26/22  5:37 PM   Result Value Ref Range    Ventricular Rate 71 BPM    Atrial Rate 71 BPM    P-R Interval 223 ms    QRS Duration 86 ms    Q-T Interval 388 ms    QTC Calculation (Bezet) 422 ms    Calculated P Axis 61 degrees    Calculated R Axis 40 degrees    Calculated T Axis 59 degrees    Diagnosis       Sinus rhythm  Prolonged CT interval  Probable left atrial enlargement  Anteroseptal infarct, age indeterminate    Confirmed by Tiny Ratel, M.D., Lethaniel Kayser ([id number]) on 6/27/2022 8:31:05 AM     CBC WITH AUTOMATED DIFF    Collection Time: 06/26/22  5:45 PM   Result Value Ref Range    WBC 4.8 4.4 - 11.3 K/uL    RBC 3.10 (L) 4.50 - 5.90 M/uL    HGB 8.9 (L) 13.5 - 17.5 g/dL    HCT 27.1 (L) 41 - 53 %    MCV 87.4 80 - 100 FL    MCH 28.7 (L) 31 - 34 PG    MCHC 32.8 31.0 - 36.0 g/dL    RDW 14.2 11.5 - 14.5 %    PLATELET 446 420 - 506 K/uL    MPV 8.5 6.5 - 11.5 FL    NRBC 0.1  WBC    ABSOLUTE NRBC 0.00 K/uL    NEUTROPHILS 67 42 - 75 %    LYMPHOCYTES 18 (L) 20.5 - 51.1 %    MONOCYTES 11 (H) 1.7 - 9.3 %    EOSINOPHILS 3 (H) 0.9 - 2.9 %    BASOPHILS 1 0.0 - 2.5 %    ABS. NEUTROPHILS 3.2 1.8 - 7.7 K/UL    ABS. LYMPHOCYTES 0.9 (L) 1.0 - 4.8 K/UL    ABS. MONOCYTES 0.5 0.2 - 2.4 K/UL    ABS. EOSINOPHILS 0.1 0.0 - 0.7 K/UL    ABS.  BASOPHILS 0.0 0.0 - 0.2 K/UL   METABOLIC PANEL, COMPREHENSIVE    Collection Time: 06/26/22  5:45 PM   Result Value Ref Range    Sodium 141 136 - 145 mmol/L    Potassium 5.2 (H) 3.5 - 5.1 mmol/L    Chloride 109 (H) 97 - 108 mmol/L    CO2 23 21 - 32 mmol/L    Anion gap 9 5 - 15 mmol/L    Glucose 92 65 - 100 mg/dL    BUN 40 (H) 6 - 20 mg/dL    Creatinine 4.55 (H) 0.70 - 1.30 mg/dL    BUN/Creatinine ratio 9 (L) 12 - 20      GFR est AA 16 (L) >60 ml/min/1.73m2    GFR est non-AA 13 (L) >60 ml/min/1.73m2    Calcium 8.4 (L) 8.5 - 10.1 mg/dL    Bilirubin, total 0.4 0.2 - 1.0 mg/dL    AST (SGOT) 26 15 - 37 U/L    ALT (SGPT) 44 12 - 78 U/L    Alk.  phosphatase 122 (H) 45 - 117 U/L    Protein, total 7.6 6.4 - 8.2 g/dL    Albumin 3.5 3.5 - 5.0 g/dL    Globulin 4.1 (H) 2.0 - 4.0 g/dL    A-G Ratio 0.9 (L) 1.1 - 2.2     TROPONIN-HIGH SENSITIVITY    Collection Time: 06/26/22  5:45 PM   Result Value Ref Range    Troponin-High Sensitivity 36 0 - 76 ng/L   NT-PRO BNP    Collection Time: 06/26/22  5:45 PM   Result Value Ref Range    NT pro-BNP 2,364 (H) <125 pg/mL   TROPONIN-HIGH SENSITIVITY    Collection Time: 06/26/22  7:31 PM   Result Value Ref Range    Troponin-High Sensitivity 38 0 - 76 ng/L   GLUCOSE, POC    Collection Time: 06/27/22  7:42 AM   Result Value Ref Range    Glucose (POC) 77 65 - 117 mg/dL    Performed by Julio Cesar Colon    TROPONIN-HIGH SENSITIVITY    Collection Time: 06/27/22  8:00 AM   Result Value Ref Range    Troponin-High Sensitivity 55 0 - 76 ng/L   RENAL FUNCTION PANEL    Collection Time: 06/27/22  8:25 AM   Result Value Ref Range    Sodium 145 136 - 145 mmol/L    Potassium 5.2 (H) 3.5 - 5.1 mmol/L    Chloride 111 (H) 97 - 108 mmol/L    CO2 23 21 - 32 mmol/L    Anion gap 11 5 - 15 mmol/L    Glucose 67 65 - 100 mg/dL    BUN 42 (H) 6 - 20 mg/dL    Creatinine 4.46 (H) 0.70 - 1.30 mg/dL    BUN/Creatinine ratio 9 (L) 12 - 20      GFR est AA 17 (L) >60 ml/min/1.73m2    GFR est non-AA 14 (L) >60 ml/min/1.73m2    Calcium 8.5 8.5 - 10.1 mg/dL    Phosphorus 4.5 2.6 - 4.7 mg/dL    Albumin 3.2 (L) 3.5 - 5.0 g/dL   MAGNESIUM    Collection Time: 06/27/22  8:25 AM   Result Value Ref Range    Magnesium 1.7 1.6 - 2.4 mg/dL   CBC WITH AUTOMATED DIFF    Collection Time: 06/27/22  8:25 AM   Result Value Ref Range    WBC 4.5 4.4 - 11.3 K/uL    RBC 3.01 (L) 4.50 - 5.90 M/uL    HGB 8.5 (L) 13.5 - 17.5 g/dL    HCT 26.0 (L) 41 - 53 %    MCV 86.3 80 - 100 FL    MCH 28.4 (L) 31 - 34 PG    MCHC 32.9 31.0 - 36.0 g/dL    RDW 14.2 11.5 - 14.5 %    PLATELET 862 (L) 188 - 400 K/uL    MPV 8.0 6.5 - 11.5 FL    NRBC 0.3  WBC    ABSOLUTE NRBC 0.01 K/uL    NEUTROPHILS 56 42 - 75 %    LYMPHOCYTES 25 20.5 - 51.1 %    MONOCYTES 14 (H) 1.7 - 9.3 %    EOSINOPHILS 4 (H) 0.9 - 2.9 %    BASOPHILS 1 0.0 - 2.5 %    ABS. NEUTROPHILS 2.5 1.8 - 7.7 K/UL    ABS. LYMPHOCYTES 1.1 1.0 - 4.8 K/UL    ABS. MONOCYTES 0.6 0.2 - 2.4 K/UL    ABS. EOSINOPHILS 0.2 0.0 - 0.7 K/UL    ABS.  BASOPHILS 0.0 0.0 - 0.2 K/UL   TSH 3RD GENERATION    Collection Time: 06/27/22  8:25 AM   Result Value Ref Range    TSH 3.37 0.36 - 3.74 uIU/mL   URINALYSIS W/ REFLEX CULTURE    Collection Time: 06/27/22  9:10 AM    Specimen: Urine   Result Value Ref Range    Color Yellow/Straw      Appearance Clear Clear      Specific gravity 1.010 1.003 - 1.030      pH (UA) 7.0 5.0 - 8.0      Protein 100 (A) Negative mg/dL    Glucose Negative Negative mg/dL    Ketone Negative Negative mg/dL    Bilirubin Negative Negative      Blood Small (A) Negative      Urobilinogen 0.2 0.2 - 1.0 EU/dL    Nitrites Negative Negative      Leukocyte Esterase Trace (A) Negative      WBC 5-10 0 - 5 /hpf    RBC 5-10 0 - 3 /hpf    Bacteria Negative Negative /hpf    UA:UC IF INDICATED Culture not indicated by UA result Culture not indicated by UA result     DRUG SCREEN, URINE    Collection Time: 06/27/22  9:10 AM   Result Value Ref Range    AMPHETAMINES Negative Negative      BARBITURATES Negative Negative      BENZODIAZEPINES Negative Negative      COCAINE Negative Negative      ECSTASY, MDMA Negative Negative      METHADONE Negative Negative      OPIATES Negative Negative      PCP(PHENCYCLIDINE) Negative Negative      THC (TH-CANNABINOL) Negative Negative      Drug screen comment        This test is a screen for drugs of abuse in a medical setting only (i.e., they are unconfirmed results and as such must not be used for non-medical purposes, e.g.,employment testing, legal testing). Due to its inherent nature, false positive (FP) and false negative (FN) results may be obtained. Therefore, if necessary for medical care, recommend confirmation of positive findings by GC/MS. PROTEIN/CREATININE RATIO, URINE    Collection Time: 06/27/22  9:10 AM   Result Value Ref Range    Protein, urine random 104 (H) 0.0 - 11.9 mg/dL    Creatinine, urine 68.55 (A) No reference range has been established. mg/dL    Protein/Creat. urine Ratio 1.5            Intake/Output Summary (Last 24 hours) at 6/27/2022 1041  Last data filed at 6/27/2022 0907  Gross per 24 hour   Intake 630 ml   Output 1600 ml   Net -970 ml      Current Shift: 06/27 0701 - 06/27 1900  In: 480 [P.O.:480]  Out: 400 [Urine:400]  Last 3 Shifts: 06/25 1901 - 06/27 0700  In: 150 [P.O.:150]  Out: 1200 [Urine:1200]  Physical Exam  Vitals reviewed. Constitutional:       General: He is not in acute distress. Appearance: Normal appearance. He is normal weight. He is not ill-appearing. HENT:      Mouth/Throat:      Mouth: Mucous membranes are moist.   Cardiovascular:      Rate and Rhythm: Normal rate and regular rhythm. Heart sounds: No murmur heard. No gallop. Pulmonary:      Effort: Pulmonary effort is normal.      Breath sounds: Normal breath sounds. Abdominal:      General: Bowel sounds are normal.      Palpations: Abdomen is soft. Musculoskeletal:      Right lower leg: Edema present. Left lower leg: Edema present. Skin:     General: Skin is warm and dry. Coloration: Skin is not jaundiced or pale. Findings: No bruising or erythema. Neurological:      General: No focal deficit present. Mental Status: He is alert. Mental status is at baseline. He is disoriented. Cranial Nerves: No cranial nerve deficit.    Psychiatric: Mood and Affect: Mood normal.         Behavior: Behavior normal.         Thought Content: Thought content normal.          Data Review:   Recent Results (from the past 24 hour(s))   EKG, 12 LEAD, INITIAL    Collection Time: 06/26/22  5:37 PM   Result Value Ref Range    Ventricular Rate 71 BPM    Atrial Rate 71 BPM    P-R Interval 223 ms    QRS Duration 86 ms    Q-T Interval 388 ms    QTC Calculation (Bezet) 422 ms    Calculated P Axis 61 degrees    Calculated R Axis 40 degrees    Calculated T Axis 59 degrees    Diagnosis       Sinus rhythm  Prolonged [place] interval  Probable left atrial enlargement  Anteroseptal infarct, age indeterminate    Confirmed by Art Pizarro M.D., [employer] ([id number]) on 6/27/2022 8:31:05 AM     CBC WITH AUTOMATED DIFF    Collection Time: 06/26/22  5:45 PM   Result Value Ref Range    WBC 4.8 4.4 - 11.3 K/uL    RBC 3.10 (L) 4.50 - 5.90 M/uL    HGB 8.9 (L) 13.5 - 17.5 g/dL    HCT 27.1 (L) 41 - 53 %    MCV 87.4 80 - 100 FL    MCH 28.7 (L) 31 - 34 PG    MCHC 32.8 31.0 - 36.0 g/dL    RDW 14.2 11.5 - 14.5 %    PLATELET 101 992 - 006 K/uL    MPV 8.5 6.5 - 11.5 FL    NRBC 0.1  WBC    ABSOLUTE NRBC 0.00 K/uL    NEUTROPHILS 67 42 - 75 %    LYMPHOCYTES 18 (L) 20.5 - 51.1 %    MONOCYTES 11 (H) 1.7 - 9.3 %    EOSINOPHILS 3 (H) 0.9 - 2.9 %    BASOPHILS 1 0.0 - 2.5 %    ABS. NEUTROPHILS 3.2 1.8 - 7.7 K/UL    ABS. LYMPHOCYTES 0.9 (L) 1.0 - 4.8 K/UL    ABS. MONOCYTES 0.5 0.2 - 2.4 K/UL    ABS. EOSINOPHILS 0.1 0.0 - 0.7 K/UL    ABS.  BASOPHILS 0.0 0.0 - 0.2 K/UL   METABOLIC PANEL, COMPREHENSIVE    Collection Time: 06/26/22  5:45 PM   Result Value Ref Range    Sodium 141 136 - 145 mmol/L    Potassium 5.2 (H) 3.5 - 5.1 mmol/L    Chloride 109 (H) 97 - 108 mmol/L    CO2 23 21 - 32 mmol/L    Anion gap 9 5 - 15 mmol/L    Glucose 92 65 - 100 mg/dL    BUN 40 (H) 6 - 20 mg/dL    Creatinine 4.55 (H) 0.70 - 1.30 mg/dL    BUN/Creatinine ratio 9 (L) 12 - 20      GFR est AA 16 (L) >60 ml/min/1.73m2    GFR est non-AA 13 (L) >60 ml/min/1.73m2    Calcium 8.4 (L) 8.5 - 10.1 mg/dL    Bilirubin, total 0.4 0.2 - 1.0 mg/dL    AST (SGOT) 26 15 - 37 U/L    ALT (SGPT) 44 12 - 78 U/L    Alk.  phosphatase 122 (H) 45 - 117 U/L    Protein, total 7.6 6.4 - 8.2 g/dL    Albumin 3.5 3.5 - 5.0 g/dL    Globulin 4.1 (H) 2.0 - 4.0 g/dL    A-G Ratio 0.9 (L) 1.1 - 2.2     TROPONIN-HIGH SENSITIVITY    Collection Time: 06/26/22  5:45 PM   Result Value Ref Range    Troponin-High Sensitivity 36 0 - 76 ng/L   NT-PRO BNP    Collection Time: 06/26/22  5:45 PM   Result Value Ref Range    NT pro-BNP 2,364 (H) <125 pg/mL   TROPONIN-HIGH SENSITIVITY    Collection Time: 06/26/22  7:31 PM   Result Value Ref Range    Troponin-High Sensitivity 38 0 - 76 ng/L   GLUCOSE, POC    Collection Time: 06/27/22  7:42 AM   Result Value Ref Range    Glucose (POC) 77 65 - 117 mg/dL    Performed by Ruth Guerrero    TROPONIN-HIGH SENSITIVITY    Collection Time: 06/27/22  8:00 AM   Result Value Ref Range    Troponin-High Sensitivity 55 0 - 76 ng/L   RENAL FUNCTION PANEL    Collection Time: 06/27/22  8:25 AM   Result Value Ref Range    Sodium 145 136 - 145 mmol/L    Potassium 5.2 (H) 3.5 - 5.1 mmol/L    Chloride 111 (H) 97 - 108 mmol/L    CO2 23 21 - 32 mmol/L    Anion gap 11 5 - 15 mmol/L    Glucose 67 65 - 100 mg/dL    BUN 42 (H) 6 - 20 mg/dL    Creatinine 4.46 (H) 0.70 - 1.30 mg/dL    BUN/Creatinine ratio 9 (L) 12 - 20      GFR est AA 17 (L) >60 ml/min/1.73m2    GFR est non-AA 14 (L) >60 ml/min/1.73m2    Calcium 8.5 8.5 - 10.1 mg/dL    Phosphorus 4.5 2.6 - 4.7 mg/dL    Albumin 3.2 (L) 3.5 - 5.0 g/dL   MAGNESIUM    Collection Time: 06/27/22  8:25 AM   Result Value Ref Range    Magnesium 1.7 1.6 - 2.4 mg/dL   CBC WITH AUTOMATED DIFF    Collection Time: 06/27/22  8:25 AM   Result Value Ref Range    WBC 4.5 4.4 - 11.3 K/uL    RBC 3.01 (L) 4.50 - 5.90 M/uL    HGB 8.5 (L) 13.5 - 17.5 g/dL    HCT 26.0 (L) 41 - 53 %    MCV 86.3 80 - 100 FL    MCH 28.4 (L) 31 - 34 PG    MCHC 32.9 31.0 - 36.0 g/dL    RDW 14.2 11.5 - 14.5 %    PLATELET 063 (L) 257 - 400 K/uL    MPV 8.0 6.5 - 11.5 FL    NRBC 0.3  WBC    ABSOLUTE NRBC 0.01 K/uL    NEUTROPHILS 56 42 - 75 %    LYMPHOCYTES 25 20.5 - 51.1 %    MONOCYTES 14 (H) 1.7 - 9.3 %    EOSINOPHILS 4 (H) 0.9 - 2.9 %    BASOPHILS 1 0.0 - 2.5 %    ABS. NEUTROPHILS 2.5 1.8 - 7.7 K/UL    ABS. LYMPHOCYTES 1.1 1.0 - 4.8 K/UL    ABS. MONOCYTES 0.6 0.2 - 2.4 K/UL    ABS. EOSINOPHILS 0.2 0.0 - 0.7 K/UL    ABS. BASOPHILS 0.0 0.0 - 0.2 K/UL   TSH 3RD GENERATION    Collection Time: 06/27/22  8:25 AM   Result Value Ref Range    TSH 3.37 0.36 - 3.74 uIU/mL   URINALYSIS W/ REFLEX CULTURE    Collection Time: 06/27/22  9:10 AM    Specimen: Urine   Result Value Ref Range    Color Yellow/Straw      Appearance Clear Clear      Specific gravity 1.010 1.003 - 1.030      pH (UA) 7.0 5.0 - 8.0      Protein 100 (A) Negative mg/dL    Glucose Negative Negative mg/dL    Ketone Negative Negative mg/dL    Bilirubin Negative Negative      Blood Small (A) Negative      Urobilinogen 0.2 0.2 - 1.0 EU/dL    Nitrites Negative Negative      Leukocyte Esterase Trace (A) Negative      WBC 5-10 0 - 5 /hpf    RBC 5-10 0 - 3 /hpf    Bacteria Negative Negative /hpf    UA:UC IF INDICATED Culture not indicated by UA result Culture not indicated by UA result     DRUG SCREEN, URINE    Collection Time: 06/27/22  9:10 AM   Result Value Ref Range    AMPHETAMINES Negative Negative      BARBITURATES Negative Negative      BENZODIAZEPINES Negative Negative      COCAINE Negative Negative      ECSTASY, MDMA Negative Negative      METHADONE Negative Negative      OPIATES Negative Negative      PCP(PHENCYCLIDINE) Negative Negative      THC (TH-CANNABINOL) Negative Negative      Drug screen comment        This test is a screen for drugs of abuse in a medical setting only (i.e., they are unconfirmed results and as such must not be used for non-medical purposes, e.g.,employment testing, legal testing).  Due to its inherent nature, false positive (FP) and false negative (FN) results may be obtained. Therefore, if necessary for medical care, recommend confirmation of positive findings by GC/MS. PROTEIN/CREATININE RATIO, URINE    Collection Time: 06/27/22  9:10 AM   Result Value Ref Range    Protein, urine random 104 (H) 0.0 - 11.9 mg/dL    Creatinine, urine 68.55 (A) No reference range has been established. mg/dL    Protein/Creat. urine Ratio 1.5           XR CHEST PORT   Final Result   Interval increase in enlargement of the cardiac silhouette, which   may be related to cardiomegaly or a pericardial effusion. Further evaluation as   clinically indicated. Patient Active Problem List   Diagnosis Code    Accelerated hypertension I10    Vasovagal near-syncope R55    Chest pain R07.9    CKD (chronic kidney disease) stage 4, GFR 15-29 ml/min ([facility]) N18.4    Hyperkalemia E87.5    Dyspnea R06.00        DIAGNOSES:  1. Acute kidney injury, grade 3 DAVIAN and criteria  2. CKD stage IV  3. Hypertension, urgency-difficult to control  4. Hypervolemia- could be decompensated CHF  5. Mild hyperkalemia  6. Type IV RTA  7. Diabetes mellitus type 2 without complications  8. History of Paget's disease  9. Right pelvic malrotated kidney  DISCUSSION:   He has had history of right malrotated pelvic kidney and could have renal artery stenosis that is provoking/making him vulnerable to these attacks of flash pulmonary edema    PLAN:   Renal artery duplex.  Continue sodium bicarbonate   Continue IV Lasix   Reinstate minoxidil, if okay with cardiology- that is that there is no major CAD   Low sodium, low potassium diet and please give him a list of low potassium foods on discharge.  Follow-up with me in 2 weeks after discharge    Addendum-it appears that renal duplex cannot be done at this time. Alternative option is to do CO2 angiograms or renal artery angiogram and do angioplasty.   We will coordinate with cardiology to see if that can be arranged    Thanks for consulting me. Please don't hesitate to contact me if any questions arise of if I can assist in any manner. This dictation was done by [employer], [employer] voice recognition software. Often unanticipated grammatical, syntax, phones and other interpretive errors are inadvertently transcribed. Please excuse errors that have escaped final proofreading. Please contact me if you suspect dictation or transcription errors.   Dr Hays Silence  [address], [address] [facility], [address] [facility]  Cell Phone: [phone]  Office phone: [phone]  Fax: [phone]

## 2022-06-27 NOTE — ROUTINE PROCESS
The pt have rest since being admitted. The edema is decreasing in his lower ext. He have been diuresing well. He continue to have some exertional dyspnea. Will continue to monitor.

## 2022-06-27 NOTE — CONSULTS
CONSULTATION    REASON FOR CONSULT:  Chest Pain    REQUESTING PROVIDER:  Dr. Abeba Stein:    Chief Complaint   Patient presents with    Fatigue    Shortness of Breath    Cough         HISTORY OF PRESENT ILLNESS:  Nathalie Shay is a 60y.o. year-old male with past medical history significant for poorly controlled HTN (with known history of right malrotated pelvic kidney) , CKD4, DM, Lumbar Spondylosis with radiculopathy, Chronic HFpEF, Paget's Disease, and Moderate Multivalvular disease who presented to ED for evaluation of chest tightness non-radiating with associated shortness of breath, cough, peripheral edema, and scrotal edema. He also reports orthopnea and PND. Sx are worsened by lying flat and exertion. NO specific alleviating factors other than IV diuresis in ED. This am he reports that he is feeling mildly better, but still not at baseline. Workup in ED revealed Hgb 8.5, Plat 145, Creat 4.55, K 5.2, BNP 2364, HS Trop 56-90-89, CXR showing Cardiomegaly with worsening of silhoutte concerning for increase in Pericardial effusion noted on TTE 5/25/22 when admitted at [facility]. Records from hospital admission course thus far reviewed. Telemetry Review: SR      PAST MEDICAL HISTORY:    Past Medical History:   Diagnosis Date    Chronic kidney disease     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     Diabetes ([address]     Hypertension     Lumbar spondylosis        PAST SURGICAL HISTORY: No past surgical history on file. ALLERGIES:  No Known Allergies    FAMILY HISTORY:    Family History   Problem Relation Age of Onset    Hypotension Mother     Hypertension Father        SOCIAL HISTORY:    Social History     Tobacco Use    Smoking status: Never Smoker    Smokeless tobacco: Never Used   Substance Use Topics    Alcohol use: Not Currently    Drug use: Not on file         HOME MEDICATIONS:    Prior to Admission Medications   Prescriptions Last Dose Informant Patient Reported? Taking? SITagliptin (JANUVIA) 50 mg tablet  Transfer Papers Yes No   Sig: Take 50 mg by mouth daily. amLODIPine (NORVASC) 10 mg tablet  Transfer Papers Yes No   Sig: Take 10 mg by mouth daily. atorvastatin (LIPITOR) 40 mg tablet  Transfer Papers Yes No   Sig: Take 40 mg by mouth daily. carvediloL (COREG) 25 mg tablet   No No   Sig: Take 1 Tablet by mouth two (2) times daily (with meals). ferrous sulfate 325 mg (65 mg iron) tablet  Transfer Papers Yes No   Sig: Take 325 mg by mouth two (2) times daily (with meals). glipiZIDE (GLUCOTROL) 5 mg tablet  Other Yes No   Sig: Take 5 mg by mouth two (2) times a day. hydrALAZINE (APRESOLINE) 100 mg tablet  Transfer Papers Yes No   Sig: Take 50 mg by mouth three (3) times daily. isosorbide mononitrate (ISMO, MONOKET) 20 mg tablet  Transfer Papers Yes No   Sig: Take 20 mg by mouth two (2) times a day. sodium bicarbonate 650 mg tablet  Transfer Papers Yes No   Sig: Take 1,300 mg by mouth three (3) times daily. Facility-Administered Medications: None       REVIEW OF SYSTEMS:  Complete review of systems performed, pertinents noted above, all other systems are negative. Patient Vitals for the past 24 hrs:   Temp Pulse Resp BP SpO2   06/27/22 1157 -- 76 -- -- --   06/27/22 1111 97.5 °F (36.4 °C) 77 20 (!) 182/77 93 %   06/27/22 0800 -- 75 -- -- --   06/27/22 0709 97.3 °F (36.3 °C) 74 20 (!) 163/70 92 %   06/27/22 0437 98.7 °F (37.1 °C) 75 18 (!) 158/73 94 %   06/27/22 0400 -- 75 -- -- --   06/27/22 0001 98.2 °F (36.8 °C) 77 20 (!) 165/83 95 %   06/26/22 2045 97.1 °F (36.2 °C) 75 18 (!) 168/83 96 %   06/26/22 1710 98.5 °F (36.9 °C) 70 18 (!) 143/68 97 %       PHYSICAL EXAMINATION:    General: Well nourished male lying in bed, NAD, A&O  HEENT: Normocephalic, PERRL, no drainage  Neck: Supple, Trachea midline, No JVD  RESP: CTA bilaterally. + Symmetrical chest movement. No SOB or distress.  On RA  Cardiovascular: RRR no MRG  PVS: No rubor, cyanosis, trace pedal edema, Radial, DP, PT pulses equal bilaterally  ABD: flat, soft, NT, Normoactive BS  Derm: Warm/Dry/Intact with no lesions, normal turgor  Neuro: A&O PPTS, cranial nerves II- XII grossly intact via interaction with patient. No focal deficits  PSYCH: No anxiety or agitation      Electrocardiogram performed earlier reviewed, it shows SR, 1*AVB, LAE, septal q-waves    Recent labs results and imaging reviewed. Recent Results (from the past 24 hour(s))   EKG, 12 LEAD, INITIAL    Collection Time: 06/26/22  5:37 PM   Result Value Ref Range    Ventricular Rate 71 BPM    Atrial Rate 71 BPM    P-R Interval 223 ms    QRS Duration 86 ms    Q-T Interval 388 ms    QTC Calculation (Bezet) 422 ms    Calculated P Axis 61 degrees    Calculated R Axis 40 degrees    Calculated T Axis 59 degrees    Diagnosis       Sinus rhythm  Prolonged NM interval  Probable left atrial enlargement  Anteroseptal infarct, age indeterminate    Confirmed by Delaney Almonte M.D., [facility] ([id number]) on 6/27/2022 8:31:05 AM     CBC WITH AUTOMATED DIFF    Collection Time: 06/26/22  5:45 PM   Result Value Ref Range    WBC 4.8 4.4 - 11.3 K/uL    RBC 3.10 (L) 4.50 - 5.90 M/uL    HGB 8.9 (L) 13.5 - 17.5 g/dL    HCT 27.1 (L) 41 - 53 %    MCV 87.4 80 - 100 FL    MCH 28.7 (L) 31 - 34 PG    MCHC 32.8 31.0 - 36.0 g/dL    RDW 14.2 11.5 - 14.5 %    PLATELET 247 269 - 308 K/uL    MPV 8.5 6.5 - 11.5 FL    NRBC 0.1  WBC    ABSOLUTE NRBC 0.00 K/uL    NEUTROPHILS 67 42 - 75 %    LYMPHOCYTES 18 (L) 20.5 - 51.1 %    MONOCYTES 11 (H) 1.7 - 9.3 %    EOSINOPHILS 3 (H) 0.9 - 2.9 %    BASOPHILS 1 0.0 - 2.5 %    ABS. NEUTROPHILS 3.2 1.8 - 7.7 K/UL    ABS. LYMPHOCYTES 0.9 (L) 1.0 - 4.8 K/UL    ABS. MONOCYTES 0.5 0.2 - 2.4 K/UL    ABS. EOSINOPHILS 0.1 0.0 - 0.7 K/UL    ABS.  BASOPHILS 0.0 0.0 - 0.2 K/UL   METABOLIC PANEL, COMPREHENSIVE    Collection Time: 06/26/22  5:45 PM   Result Value Ref Range    Sodium 141 136 - 145 mmol/L    Potassium 5.2 (H) 3.5 - 5.1 mmol/L    Chloride 109 (H) 97 - 108 mmol/L    CO2 23 21 - 32 mmol/L    Anion gap 9 5 - 15 mmol/L    Glucose 92 65 - 100 mg/dL    BUN 40 (H) 6 - 20 mg/dL    Creatinine 4.55 (H) 0.70 - 1.30 mg/dL    BUN/Creatinine ratio 9 (L) 12 - 20      GFR est AA 16 (L) >60 ml/min/1.73m2    GFR est non-AA 13 (L) >60 ml/min/1.73m2    Calcium 8.4 (L) 8.5 - 10.1 mg/dL    Bilirubin, total 0.4 0.2 - 1.0 mg/dL    AST (SGOT) 26 15 - 37 U/L    ALT (SGPT) 44 12 - 78 U/L    Alk.  phosphatase 122 (H) 45 - 117 U/L    Protein, total 7.6 6.4 - 8.2 g/dL    Albumin 3.5 3.5 - 5.0 g/dL    Globulin 4.1 (H) 2.0 - 4.0 g/dL    A-G Ratio 0.9 (L) 1.1 - 2.2     TROPONIN-HIGH SENSITIVITY    Collection Time: 06/26/22  5:45 PM   Result Value Ref Range    Troponin-High Sensitivity 36 0 - 76 ng/L   NT-PRO BNP    Collection Time: 06/26/22  5:45 PM   Result Value Ref Range    NT pro-BNP 2,364 (H) <125 pg/mL   TROPONIN-HIGH SENSITIVITY    Collection Time: 06/26/22  7:31 PM   Result Value Ref Range    Troponin-High Sensitivity 38 0 - 76 ng/L   GLUCOSE, POC    Collection Time: 06/27/22  7:42 AM   Result Value Ref Range    Glucose (POC) 77 65 - 117 mg/dL    Performed by Yvetta Shone    TROPONIN-HIGH SENSITIVITY    Collection Time: 06/27/22  8:00 AM   Result Value Ref Range    Troponin-High Sensitivity 55 0 - 76 ng/L   RENAL FUNCTION PANEL    Collection Time: 06/27/22  8:25 AM   Result Value Ref Range    Sodium 145 136 - 145 mmol/L    Potassium 5.2 (H) 3.5 - 5.1 mmol/L    Chloride 111 (H) 97 - 108 mmol/L    CO2 23 21 - 32 mmol/L    Anion gap 11 5 - 15 mmol/L    Glucose 67 65 - 100 mg/dL    BUN 42 (H) 6 - 20 mg/dL    Creatinine 4.46 (H) 0.70 - 1.30 mg/dL    BUN/Creatinine ratio 9 (L) 12 - 20      GFR est AA 17 (L) >60 ml/min/1.73m2    GFR est non-AA 14 (L) >60 ml/min/1.73m2    Calcium 8.5 8.5 - 10.1 mg/dL    Phosphorus 4.5 2.6 - 4.7 mg/dL    Albumin 3.2 (L) 3.5 - 5.0 g/dL   MAGNESIUM    Collection Time: 06/27/22  8:25 AM   Result Value Ref Range    Magnesium 1.7 1.6 - 2.4 mg/dL   CBC WITH AUTOMATED DIFF    Collection Time: 06/27/22  8:25 AM   Result Value Ref Range    WBC 4.5 4.4 - 11.3 K/uL    RBC 3.01 (L) 4.50 - 5.90 M/uL    HGB 8.5 (L) 13.5 - 17.5 g/dL    HCT 26.0 (L) 41 - 53 %    MCV 86.3 80 - 100 FL    MCH 28.4 (L) 31 - 34 PG    MCHC 32.9 31.0 - 36.0 g/dL    RDW 14.2 11.5 - 14.5 %    PLATELET 385 (L) 876 - 400 K/uL    MPV 8.0 6.5 - 11.5 FL    NRBC 0.3  WBC    ABSOLUTE NRBC 0.01 K/uL    NEUTROPHILS 56 42 - 75 %    LYMPHOCYTES 25 20.5 - 51.1 %    MONOCYTES 14 (H) 1.7 - 9.3 %    EOSINOPHILS 4 (H) 0.9 - 2.9 %    BASOPHILS 1 0.0 - 2.5 %    ABS. NEUTROPHILS 2.5 1.8 - 7.7 K/UL    ABS. LYMPHOCYTES 1.1 1.0 - 4.8 K/UL    ABS. MONOCYTES 0.6 0.2 - 2.4 K/UL    ABS. EOSINOPHILS 0.2 0.0 - 0.7 K/UL    ABS.  BASOPHILS 0.0 0.0 - 0.2 K/UL   TSH 3RD GENERATION    Collection Time: 06/27/22  8:25 AM   Result Value Ref Range    TSH 3.37 0.36 - 3.74 uIU/mL   URINALYSIS W/ REFLEX CULTURE    Collection Time: 06/27/22  9:10 AM    Specimen: Urine   Result Value Ref Range    Color Yellow/Straw      Appearance Clear Clear      Specific gravity 1.010 1.003 - 1.030      pH (UA) 7.0 5.0 - 8.0      Protein 100 (A) Negative mg/dL    Glucose Negative Negative mg/dL    Ketone Negative Negative mg/dL    Bilirubin Negative Negative      Blood Small (A) Negative      Urobilinogen 0.2 0.2 - 1.0 EU/dL    Nitrites Negative Negative      Leukocyte Esterase Trace (A) Negative      WBC 5-10 0 - 5 /hpf    RBC 5-10 0 - 3 /hpf    Bacteria Negative Negative /hpf    UA:UC IF INDICATED Culture not indicated by UA result Culture not indicated by UA result     DRUG SCREEN, URINE    Collection Time: 06/27/22  9:10 AM   Result Value Ref Range    AMPHETAMINES Negative Negative      BARBITURATES Negative Negative      BENZODIAZEPINES Negative Negative      COCAINE Negative Negative      ECSTASY, MDMA Negative Negative      METHADONE Negative Negative      OPIATES Negative Negative      PCP(PHENCYCLIDINE) Negative Negative      THC (TH-CANNABINOL) Negative Negative      Drug screen comment        This test is a screen for drugs of abuse in a medical setting only (i.e., they are unconfirmed results and as such must not be used for non-medical purposes, e.g.,employment testing, legal testing). Due to its inherent nature, false positive (FP) and false negative (FN) results may be obtained. Therefore, if necessary for medical care, recommend confirmation of positive findings by GC/MS. PROTEIN/CREATININE RATIO, URINE    Collection Time: 06/27/22  9:10 AM   Result Value Ref Range    Protein, urine random 104 (H) 0.0 - 11.9 mg/dL    Creatinine, urine 68.55 (A) No reference range has been established. mg/dL    Protein/Creat. urine Ratio 1.5     GLUCOSE, POC    Collection Time: 06/27/22 11:04 AM   Result Value Ref Range    Glucose (POC) 117 65 - 117 mg/dL    Performed by Nevaeh CONWAY        XR Results (maximum last 3): Results from [place] encounter on 06/26/22    XR CHEST PORT    Impression  Interval increase in enlargement of the cardiac silhouette, which  may be related to cardiomegaly or a pericardial effusion. Further evaluation as  clinically indicated. CT Results (maximum last 3): Results from [place] encounter on 05/24/22    CT HEAD WO CONT    Impression  Mild cerebellar tonsillar ectopia. No acute intracranial abnormality. No interval change since April 2019. Results from [place] encounter on 02/20/22    CT ABD PELV WO CONT    Impression  1. No acute inflammatory findings for noncontrast technique. 2. Right pelvic kidney. No urinary stone or collecting system dilation. 3. Few scattered colonic diverticula. No diverticulitis or bowel dilation. 4. Small anterior pericardial effusion. Results from [place] encounter on 01/23/15    CT ABD PELV W CONT    Impression  :  1. No bowel obstruction, ileus or perforation. No intra-abdominal abscess. 2. Hepatic steatosis.   3. Right pelvic kidney. Signing/Reading Doctor: LAURA Parekh ([id number])  Approved: LAURA LYMAN ([id number])  Jan 23 2015  4:36PM      MRI Results (maximum last 3): Results from [place] encounter on 05/24/22    MRI BRAIN WO CONT    Impression  1. Minimal subcortical white matter changes, most likely representing minimal  small vessel ischemic changes. No acute intracranial findings. Results from [place] encounter on 03/08/22    MRI LUMB SPINE WO CONT    Impression  Mild central stenoses L2-L3, L3-L4 and L4-L5 levels. Moderate stenoses L4-L5 neuroforamina with indentation of the L4 nerves within  the neuroforamina. Degenerative facet joint disease as described above. Nuclear Medicine Results (maximum last 3): No results found for this or any previous visit. US Results (maximum last 3): No results found for this or any previous visit. VAS/US Results (maximum last 3): No results found for this or any previous visit.         Current Facility-Administered Medications:     glucose chewable tablet 16 g, 4 Tablet, Oral, PRN, Mohinder Vines MD    dextrose (D50W) injection syrg 12.5-25 g, 25-50 mL, IntraVENous, PRN, Mohinder Vines MD    glucagon (GLUCAGEN) injection 1 mg, 1 mg, IntraMUSCular, PRN, Mohinder Vines MD    insulin lispro (HUMALOG) injection, , SubCUTAneous, AC&HS, Mohinder Vines MD    carvediloL (COREG) tablet 25 mg, 25 mg, Oral, BID WITH MEALS, Mohinder Vines MD    ferrous sulfate tablet 325 mg, 325 mg, Oral, BID WITH MEALS, Mohinder Vines MD    hydrALAZINE (APRESOLINE) tablet 50 mg, 50 mg, Oral, TID, Margoth Vines MD    [Held by provider] isosorbide mononitrate (ISMO, MONOKET) tablet 20 mg, 20 mg, Oral, BID, Mohinder Vines MD    [START ON 6/28/2022] alogliptin (NESINA) tablet 6.25 mg, 6.25 mg, Oral, DAILY, Mohinder Vines MD    sodium bicarbonate tablet 1,300 mg, 1,300 mg, Oral, TID, MD Zac Castro ON 6/28/2022] atorvastatin (LIPITOR) tablet 40 mg, 40 mg, Oral, DAILY, Mohinder Vines MD    [START ON 6/28/2022] amLODIPine (NORVASC) tablet 10 mg, 10 mg, Oral, DAILY, Mohinder Vines MD    glipiZIDE (GLUCOTROL) tablet 5 mg, 5 mg, Oral, BID WITH MEALS, Mohinder Vines MD    sodium chloride (NS) flush 5-40 mL, 5-40 mL, IntraVENous, Q8H, Andrew Ruelas MD, 10 mL at 06/27/22 1331    sodium chloride (NS) flush 5-40 mL, 5-40 mL, IntraVENous, PRN, Andrew Ruelas MD    acetaminophen (TYLENOL) tablet 650 mg, 650 mg, Oral, Q6H PRN **OR** acetaminophen (TYLENOL) suppository 650 mg, 650 mg, Rectal, Q6H PRN, Andrew Ruelas MD    polyethylene glycol (MIRALAX) packet 17 g, 17 g, Oral, DAILY PRN, Andrew Ruelas MD    ondansetron (ZOFRAN ODT) tablet 4 mg, 4 mg, Oral, Q8H PRN **OR** ondansetron (ZOFRAN) injection 4 mg, 4 mg, IntraVENous, Q6H PRN, Andrew Ruelas MD    enoxaparin (LOVENOX) injection 30 mg, 30 mg, SubCUTAneous, DAILY, Andrew Ruelas MD, 30 mg at 06/27/22 9834          Case discussed with collaborating physician Dr. Destini Calle and our impression and recommendations are as follows:   1. Chest Pain:  ACS ruled out with HS trop trends of 36-38-55 and EKG criteria. Just admitted late May for same complaints. TTE 5/25/22 at that time showing EF 55-60% with severe wall thickening and diastolic dysfunction. OP Ischemic evaluation recommended, however patient has not followed-up. Will plan to make NPO after midnight and perform PNST in am given continued sx and risk factors. Continue risk factor modification and BP control. 2. HTN:  BP above goal. Appreciate Nephrology input. Known to [facility] and Dr. Rosanne Izaguirre. There is concern for Renal Artery Stenosis given malrotated right pelvic kidney. Will plan for referral to [facility]-Dr. Braden Geller in OP setting for arteriogram and further intervention if required. Otherwise continue medical management with meds. 3. HLD: continue current statin dosing. Consider obtaining Lipid panel if not one in past 90 days. 4. DM:  A1C 7.1 on recent admission. Continue strict glycemic control for risk factor modification. 5. Acute on Chronic HFpEF Exacerbation:  Patient appears mildly hypervolemic on exam, though he reports it is improved since admission. Note 1600cc urine output overnight. Would recommend continuing IV diuresis today if Nephrology is amenable. Continue GDMT as appropriate. Given severe wall thickness noted on TTE 5/25/22 note cardiologist at that time (Dr. Porsha Grady) recommended Cardiac MRI in OP setting to evaluate for cardiac amyloidosis. This can further be discussed at OP follow-up once ischemic evaluation is complete. 6. Multivalvular Disease: Moderate MVR, PVR, TVR noted on TTE 5/25/22. Continue diuretics and HTN control. Appreciate nephrology assistance with diuresis. 7. Pericardial effusion: small noted on TTE 5/25. CXR upon arrival to ED notes further enlargement of cardiac silhouette. Will obtain Limited TTE to re-evaluate. Continue IV diuresis otherwise at this time, he shows no signs of decompensation. Thank you for involving us in the care of this patient. Please do not hesitate to call if additional questions arise.  If after hours please call [phone]

## 2022-06-27 NOTE — H&P
History and Physical      Chief Complaints:     Chief Complaint   Patient presents with    Fatigue    Shortness of Breath    Cough         Subjective:     Vianey De La Torre is a 60 y.o. male followed by Dr. Aaron Hampton and Dr. Jonatan Hale MD nephrologist  has a past medical history of Chronic kidney disease, 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, Diabetes ([address] Hypertension, and Lumbar spondylosis. Resents from home with acute onset of shortness of breath and left-sided chest pain/tightness. .  Patient also noted increased lower extremity and scrotal edema prior to arrival and noted that he is unable to lie flat. Patient describes chest pain occurring a few times in the past and recently discharged from [address] where patient had reaction to injections given by orthopedics. Patient describes pain as left center and radiating to the right did feel shoulder discomfort but is unable to describe symptoms. Did have positive diaphoresis with this episode. States he has been short of breath progressively increasing over the last few weeks and unable to lie flat and also noted increased swelling in his legs and scrotal area. Was noted to be given lasix 40mg IV x 1 and had good  Urine output and states his leg swelling as well as his shortness of breath have improved an close to resolving and also noted scrotal swelling has improved. Chest x-ray showed interval increase and enlargement of the cardiac silhouette BMP elevated at 2364 and BUN/creatinine at 40/4.55 with noted baseline creatinine by nephrology office at 3.9. Noted to have chronic elevated potassium at 5.2 initial troponin was 36 and on repeat was at 38 and repeat thereafter was at 55. Noted that patient has chronic uncontrolled blood pressure and mention of possible renal artery stenosis.   The patient was referred for admission to remote telemetry for chest pain, fluid overload, CKD    Past Medical History:   Diagnosis Date    Chronic kidney disease  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     Diabetes ([facility] [address]     Hypertension     Lumbar spondylosis       No past surgical history on file. Family History   Problem Relation Age of Onset    Hypotension Mother     Hypertension Father       Social History     Tobacco Use    Smoking status: Never Smoker    Smokeless tobacco: Never Used   Substance Use Topics    Alcohol use: Not Currently       Prior to Admission medications    Medication Sig Start Date End Date Taking? Authorizing Provider   carvediloL (COREG) 25 mg tablet Take 1 Tablet by mouth two (2) times daily (with meals). 5/26/22   Verenice Montiel PA   amLODIPine (NORVASC) 10 mg tablet Take 10 mg by mouth daily. Provider, Historical   atorvastatin (LIPITOR) 40 mg tablet Take 40 mg by mouth daily. Provider, Historical   ferrous sulfate 325 mg (65 mg iron) tablet Take 325 mg by mouth two (2) times daily (with meals). Provider, Historical   hydrALAZINE (APRESOLINE) 100 mg tablet Take 50 mg by mouth three (3) times daily. Provider, Historical   isosorbide mononitrate (ISMO, MONOKET) 20 mg tablet Take 20 mg by mouth two (2) times a day. Provider, Historical   SITagliptin (JANUVIA) 50 mg tablet Take 50 mg by mouth daily. Provider, Historical   sodium bicarbonate 650 mg tablet Take 1,300 mg by mouth three (3) times daily. Provider, Historical   glipiZIDE (GLUCOTROL) 5 mg tablet Take 5 mg by mouth two (2) times a day. Provider, Historical     No Known Allergies     Review of Systems:  Review of Systems   Constitutional: Negative for chills, diaphoresis, fatigue and fever. HENT: Negative for congestion, ear pain, postnasal drip, sinus pain and sore throat. Eyes: Negative for pain, discharge and redness. Respiratory: Positive for chest tightness, shortness of breath and wheezing. Negative for cough. Cardiovascular: Positive for leg swelling. Negative for chest pain and palpitations.    Gastrointestinal: Negative for abdominal pain, constipation, diarrhea, nausea and vomiting. Genitourinary: Positive for scrotal swelling. Negative for dysuria, flank pain, frequency and urgency. Musculoskeletal: Negative for arthralgias and myalgias. Neurological: Negative for dizziness, weakness and headaches. Psychiatric/Behavioral: Negative for agitation and hallucinations. The patient is not nervous/anxious. Objective:     Vitals:  Visit Vitals  BP (!) 182/77 (BP 1 Location: Left upper arm, BP Patient Position: At rest)   Pulse 76   Temp 97.5 °F (36.4 °C)   Resp 20   Ht 6' (1.829 m)   Wt 78.2 kg (172 lb 8 oz)   SpO2 93%   BMI 23.40 kg/m²       Physical Exam:  General: Alert, cooperative, no distress. Head:  Normocephalic, without obvious abnormality, atraumatic. Eyes:  Conjunctivae/corneas clear. Pupils equal, round, reactive to light. Extraocular movements intact. Lungs:  Clear to auscultation bilaterally, no wheezes, crackles  Chest wall: No tenderness or deformity. Heart:  Regular rate and rhythm, S1, S2 normal, positive murmur, click, rub, or gallop. Abdomen:   Soft, non-tender. Bowel sounds normal. No masses. No organomegaly. Back:  No spine tenderness to palpation  Extremities: Positive b/l edema nonpitting   Pulses: Symmetric all extremities. Skin: Skin color, texture, turgor normal.   Lymph nodes: Cervical nodes normal.  Neurologic: CNII-XII intact. Normal strength, sensation, and reflexes throughout.       Labs:  Recent Results (from the past 24 hour(s))   EKG, 12 LEAD, INITIAL    Collection Time: 06/26/22  5:37 PM   Result Value Ref Range    Ventricular Rate 71 BPM    Atrial Rate 71 BPM    P-R Interval 223 ms    QRS Duration 86 ms    Q-T Interval 388 ms    QTC Calculation (Bezet) 422 ms    Calculated P Axis 61 degrees    Calculated R Axis 40 degrees    Calculated T Axis 59 degrees    Diagnosis       Sinus rhythm  Prolonged RI interval  Probable left atrial enlargement  Anteroseptal infarct, age indeterminate    Confirmed by Vicki Neely M.D., Rozina Corinnede ([id number]) on 6/27/2022 8:31:05 AM     CBC WITH AUTOMATED DIFF    Collection Time: 06/26/22  5:45 PM   Result Value Ref Range    WBC 4.8 4.4 - 11.3 K/uL    RBC 3.10 (L) 4.50 - 5.90 M/uL    HGB 8.9 (L) 13.5 - 17.5 g/dL    HCT 27.1 (L) 41 - 53 %    MCV 87.4 80 - 100 FL    MCH 28.7 (L) 31 - 34 PG    MCHC 32.8 31.0 - 36.0 g/dL    RDW 14.2 11.5 - 14.5 %    PLATELET 956 713 - 925 K/uL    MPV 8.5 6.5 - 11.5 FL    NRBC 0.1  WBC    ABSOLUTE NRBC 0.00 K/uL    NEUTROPHILS 67 42 - 75 %    LYMPHOCYTES 18 (L) 20.5 - 51.1 %    MONOCYTES 11 (H) 1.7 - 9.3 %    EOSINOPHILS 3 (H) 0.9 - 2.9 %    BASOPHILS 1 0.0 - 2.5 %    ABS. NEUTROPHILS 3.2 1.8 - 7.7 K/UL    ABS. LYMPHOCYTES 0.9 (L) 1.0 - 4.8 K/UL    ABS. MONOCYTES 0.5 0.2 - 2.4 K/UL    ABS. EOSINOPHILS 0.1 0.0 - 0.7 K/UL    ABS. BASOPHILS 0.0 0.0 - 0.2 K/UL   METABOLIC PANEL, COMPREHENSIVE    Collection Time: 06/26/22  5:45 PM   Result Value Ref Range    Sodium 141 136 - 145 mmol/L    Potassium 5.2 (H) 3.5 - 5.1 mmol/L    Chloride 109 (H) 97 - 108 mmol/L    CO2 23 21 - 32 mmol/L    Anion gap 9 5 - 15 mmol/L    Glucose 92 65 - 100 mg/dL    BUN 40 (H) 6 - 20 mg/dL    Creatinine 4.55 (H) 0.70 - 1.30 mg/dL    BUN/Creatinine ratio 9 (L) 12 - 20      GFR est AA 16 (L) >60 ml/min/1.73m2    GFR est non-AA 13 (L) >60 ml/min/1.73m2    Calcium 8.4 (L) 8.5 - 10.1 mg/dL    Bilirubin, total 0.4 0.2 - 1.0 mg/dL    AST (SGOT) 26 15 - 37 U/L    ALT (SGPT) 44 12 - 78 U/L    Alk.  phosphatase 122 (H) 45 - 117 U/L    Protein, total 7.6 6.4 - 8.2 g/dL    Albumin 3.5 3.5 - 5.0 g/dL    Globulin 4.1 (H) 2.0 - 4.0 g/dL    A-G Ratio 0.9 (L) 1.1 - 2.2     TROPONIN-HIGH SENSITIVITY    Collection Time: 06/26/22  5:45 PM   Result Value Ref Range    Troponin-High Sensitivity 36 0 - 76 ng/L   NT-PRO BNP    Collection Time: 06/26/22  5:45 PM   Result Value Ref Range    NT pro-BNP 2,364 (H) <125 pg/mL   TROPONIN-HIGH SENSITIVITY    Collection Time: 06/26/22 7:31 PM   Result Value Ref Range    Troponin-High Sensitivity 38 0 - 76 ng/L   GLUCOSE, POC    Collection Time: 06/27/22  7:42 AM   Result Value Ref Range    Glucose (POC) 77 65 - 117 mg/dL    Performed by Clara Ojeda    TROPONIN-HIGH SENSITIVITY    Collection Time: 06/27/22  8:00 AM   Result Value Ref Range    Troponin-High Sensitivity 55 0 - 76 ng/L   RENAL FUNCTION PANEL    Collection Time: 06/27/22  8:25 AM   Result Value Ref Range    Sodium 145 136 - 145 mmol/L    Potassium 5.2 (H) 3.5 - 5.1 mmol/L    Chloride 111 (H) 97 - 108 mmol/L    CO2 23 21 - 32 mmol/L    Anion gap 11 5 - 15 mmol/L    Glucose 67 65 - 100 mg/dL    BUN 42 (H) 6 - 20 mg/dL    Creatinine 4.46 (H) 0.70 - 1.30 mg/dL    BUN/Creatinine ratio 9 (L) 12 - 20      GFR est AA 17 (L) >60 ml/min/1.73m2    GFR est non-AA 14 (L) >60 ml/min/1.73m2    Calcium 8.5 8.5 - 10.1 mg/dL    Phosphorus 4.5 2.6 - 4.7 mg/dL    Albumin 3.2 (L) 3.5 - 5.0 g/dL   MAGNESIUM    Collection Time: 06/27/22  8:25 AM   Result Value Ref Range    Magnesium 1.7 1.6 - 2.4 mg/dL   CBC WITH AUTOMATED DIFF    Collection Time: 06/27/22  8:25 AM   Result Value Ref Range    WBC 4.5 4.4 - 11.3 K/uL    RBC 3.01 (L) 4.50 - 5.90 M/uL    HGB 8.5 (L) 13.5 - 17.5 g/dL    HCT 26.0 (L) 41 - 53 %    MCV 86.3 80 - 100 FL    MCH 28.4 (L) 31 - 34 PG    MCHC 32.9 31.0 - 36.0 g/dL    RDW 14.2 11.5 - 14.5 %    PLATELET 146 (L) 212 - 400 K/uL    MPV 8.0 6.5 - 11.5 FL    NRBC 0.3  WBC    ABSOLUTE NRBC 0.01 K/uL    NEUTROPHILS 56 42 - 75 %    LYMPHOCYTES 25 20.5 - 51.1 %    MONOCYTES 14 (H) 1.7 - 9.3 %    EOSINOPHILS 4 (H) 0.9 - 2.9 %    BASOPHILS 1 0.0 - 2.5 %    ABS. NEUTROPHILS 2.5 1.8 - 7.7 K/UL    ABS. LYMPHOCYTES 1.1 1.0 - 4.8 K/UL    ABS. MONOCYTES 0.6 0.2 - 2.4 K/UL    ABS. EOSINOPHILS 0.2 0.0 - 0.7 K/UL    ABS.  BASOPHILS 0.0 0.0 - 0.2 K/UL   TSH 3RD GENERATION    Collection Time: 06/27/22  8:25 AM   Result Value Ref Range    TSH 3.37 0.36 - 3.74 uIU/mL   URINALYSIS W/ REFLEX CULTURE Collection Time: 06/27/22  9:10 AM    Specimen: Urine   Result Value Ref Range    Color Yellow/Straw      Appearance Clear Clear      Specific gravity 1.010 1.003 - 1.030      pH (UA) 7.0 5.0 - 8.0      Protein 100 (A) Negative mg/dL    Glucose Negative Negative mg/dL    Ketone Negative Negative mg/dL    Bilirubin Negative Negative      Blood Small (A) Negative      Urobilinogen 0.2 0.2 - 1.0 EU/dL    Nitrites Negative Negative      Leukocyte Esterase Trace (A) Negative      WBC 5-10 0 - 5 /hpf    RBC 5-10 0 - 3 /hpf    Bacteria Negative Negative /hpf    UA:UC IF INDICATED Culture not indicated by UA result Culture not indicated by UA result     DRUG SCREEN, URINE    Collection Time: 06/27/22  9:10 AM   Result Value Ref Range    AMPHETAMINES Negative Negative      BARBITURATES Negative Negative      BENZODIAZEPINES Negative Negative      COCAINE Negative Negative      ECSTASY, MDMA Negative Negative      METHADONE Negative Negative      OPIATES Negative Negative      PCP(PHENCYCLIDINE) Negative Negative      THC (TH-CANNABINOL) Negative Negative      Drug screen comment        This test is a screen for drugs of abuse in a medical setting only (i.e., they are unconfirmed results and as such must not be used for non-medical purposes, e.g.,employment testing, legal testing). Due to its inherent nature, false positive (FP) and false negative (FN) results may be obtained. Therefore, if necessary for medical care, recommend confirmation of positive findings by GC/MS. PROTEIN/CREATININE RATIO, URINE    Collection Time: 06/27/22  9:10 AM   Result Value Ref Range    Protein, urine random 104 (H) 0.0 - 11.9 mg/dL    Creatinine, urine 68.55 (A) No reference range has been established. mg/dL    Protein/Creat.  urine Ratio 1.5     GLUCOSE, POC    Collection Time: 06/27/22 11:04 AM   Result Value Ref Range    Glucose (POC) 117 65 - 117 mg/dL    Performed by Gil Perez        Imaging:  XR CHEST PORT    Result Date: 6/26/2022  Interval increase in enlargement of the cardiac silhouette, which may be related to cardiomegaly or a pericardial effusion. Further evaluation as clinically indicated.        Assessment & Plan:     Chest pain/tightness  -Cardiology consult appreciated  -Initial troponins 36 -> 38 -> 55  -Improved chest tightness after IV Lasix given  -Monitor on telemetry  -Keep n.p.o. after midnight for stress test in a.m. tomorrow  -Obtain TSH, lipid profile, A1c      Hypervolemia  -Elevated BNP of 2364 but negative chest x-ray but clinically bilateral lower extremity edema scrotal edema and positive orthopnea  -Evaluate for CHF with 2D echo with prelim small to moderate pericardial effusion and IVC minimally dilated but compressible the report still pending  -Lasix 40 mg IV x1 given the emergency room and recommended continue Lasix 40 mg IV twice daily and on discharge to place on 40 mg oral twice daily  -Monitor daily weight and input output  -Strict fluid intake to 1.5 L/day    Hypertensive urgency  -Acute on chronic and needs evaluation for renal artery stenosis  -Continue Lasix 40 mg IV twice daily along with amlodipine 10 mg, Coreg 25 mg oral twice daily, hydralazine 50 mg oral 3 times daily but will increase to 200 mg oral 3 times daily  -As needed 10 mg IV hydralazine for systolic blood pressure sustained above 165  -Vitals every 4 hours    Stage IV CKD  -Monitored chronically by    -Baseline creatinine about 3.9 and on admission was 4.55  -Monitor input output and daily weights  -Monitor daily renal panel  -Continue chronic sodium bicarbonate  -Nephrology consult appreciated  -Recommending further evaluation with co2 angiogram for renal artery angiogram but unable to perform here but is available at [facility] [address] will need to do as outpatient    Diabetes mellitus type 2  -Monitor with regular insulin sliding scale with Accu-Cheks before meals and at bedtime  -Last A1c in May 2022 was at 7.1  -Restart home medications glipizide twice daily as well as equivalent Januvia    Iron deficiency anemia  -  ferrous sulfate 325 mg oral twice daily  -Follow H&H closely    Hypomagnesemia  -Slightly low at 1.7 so 2 g IV mag sulfate rider given  -Repeat mag level in a.m.     DVT prophylaxis  -Lovenox    CODE STATUS: Full code  Patient's designated decision-maker is his daughter Shruti Weaver for some reason he is unable to make decisions for himself    Spent 45 minutes evaluating cording patient's admission to acute remote telemetry and expect at least 1 to 2 days of acute care stay          Electronically signed by Jake Mccall MD on 6/27/2022 at 3:34 PM

## 2022-06-27 NOTE — ED NOTES
Report given to [facility], LPN on acute care V David Milton. Patient to be admitted to observation telemetry room 202 bed 1.

## 2022-06-27 NOTE — ACP (ADVANCE CARE PLANNING)
Advance Care Planning   Healthcare Decision Maker:       Primary Decision Maker: Awa Perales - [phone]    Click here to complete [address] including selection of the Healthcare Decision Maker Relationship (ie \"Primary\")

## 2022-06-27 NOTE — ROUTINE PROCESS
Bedside shift change report given to ming tran (oncoming nurse) by rajesh (offgoing nurse). Report included the following information Kardex.

## 2022-06-27 NOTE — PROGRESS NOTES
Patients case reviewed during interdisciplinary team meeting in [address] [place] [address]/Acute Care Unit. Rev.  Hoang Gaytan 45, [address]

## 2022-06-28 ENCOUNTER — APPOINTMENT (OUTPATIENT)
Dept: NON INVASIVE DIAGNOSTICS | Age: 57
DRG: 194 | End: 2022-06-28
Attending: NURSE PRACTITIONER
Payer: MEDICAID

## 2022-06-28 ENCOUNTER — APPOINTMENT (OUTPATIENT)
Dept: NUCLEAR MEDICINE | Age: 57
DRG: 194 | End: 2022-06-28
Attending: NURSE PRACTITIONER
Payer: MEDICAID

## 2022-06-28 VITALS
OXYGEN SATURATION: 93 % | BODY MASS INDEX: 22.57 KG/M2 | DIASTOLIC BLOOD PRESSURE: 67 MMHG | TEMPERATURE: 97.3 F | HEART RATE: 73 BPM | SYSTOLIC BLOOD PRESSURE: 163 MMHG | HEIGHT: 72 IN | WEIGHT: 166.67 LBS | RESPIRATION RATE: 20 BRPM

## 2022-06-28 LAB
ALBUMIN SERPL-MCNC: 3.1 G/DL (ref 3.5–5)
ANION GAP SERPL CALC-SCNC: 8 MMOL/L (ref 5–15)
BASOPHILS # BLD: 0 K/UL (ref 0–0.2)
BASOPHILS NFR BLD: 1 % (ref 0–2.5)
BUN SERPL-MCNC: 45 MG/DL (ref 6–20)
BUN/CREAT SERPL: 10 (ref 12–20)
CA-I BLD-MCNC: 8.3 MG/DL (ref 8.5–10.1)
CHLORIDE SERPL-SCNC: 113 MMOL/L (ref 97–108)
CHOLEST SERPL-MCNC: 108 MG/DL
CO2 SERPL-SCNC: 25 MMOL/L (ref 21–32)
CREAT SERPL-MCNC: 4.66 MG/DL (ref 0.7–1.3)
EOSINOPHIL # BLD: 0.2 K/UL (ref 0–0.7)
EOSINOPHIL NFR BLD: 3 % (ref 0.9–2.9)
ERYTHROCYTE [DISTWIDTH] IN BLOOD BY AUTOMATED COUNT: 14 % (ref 11.5–14.5)
GLUCOSE BLD STRIP.AUTO-MCNC: 156 MG/DL (ref 65–117)
GLUCOSE BLD STRIP.AUTO-MCNC: 238 MG/DL (ref 65–117)
GLUCOSE SERPL-MCNC: 138 MG/DL (ref 65–100)
HCT VFR BLD AUTO: 25.8 % (ref 41–53)
HDLC SERPL-MCNC: 39 MG/DL
HDLC SERPL: 2.8 {RATIO} (ref 0–5)
HGB BLD-MCNC: 8.4 G/DL (ref 13.5–17.5)
IRON SATN MFR SERPL: 21 % (ref 20–50)
IRON SERPL-MCNC: 45 UG/DL (ref 35–150)
LDLC SERPL CALC-MCNC: 52.4 MG/DL (ref 0–100)
LIPID PROFILE,FLP: NORMAL
LYMPHOCYTES # BLD: 1.2 K/UL (ref 1–4.8)
LYMPHOCYTES NFR BLD: 25 % (ref 20.5–51.1)
MAGNESIUM SERPL-MCNC: 2 MG/DL (ref 1.6–2.4)
MCH RBC QN AUTO: 27.9 PG (ref 31–34)
MCHC RBC AUTO-ENTMCNC: 32.5 G/DL (ref 31–36)
MCV RBC AUTO: 86.1 FL (ref 80–100)
MONOCYTES # BLD: 0.7 K/UL (ref 0.2–2.4)
MONOCYTES NFR BLD: 14 % (ref 1.7–9.3)
NEUTS SEG # BLD: 2.6 K/UL (ref 1.8–7.7)
NEUTS SEG NFR BLD: 57 % (ref 42–75)
NRBC # BLD: 0 K/UL
NRBC BLD-RTO: 0.1 PER 100 WBC
NUC STRESS EJECTION FRACTION: 55 %
PERFORMED BY, TECHID: ABNORMAL
PERFORMED BY, TECHID: ABNORMAL
PHOSPHATE SERPL-MCNC: 4.1 MG/DL (ref 2.6–4.7)
PLATELET # BLD AUTO: 146 K/UL (ref 150–400)
PMV BLD AUTO: 8.2 FL (ref 6.5–11.5)
POTASSIUM SERPL-SCNC: 5.2 MMOL/L (ref 3.5–5.1)
RBC # BLD AUTO: 3 M/UL (ref 4.5–5.9)
SODIUM SERPL-SCNC: 146 MMOL/L (ref 136–145)
STRESS BASELINE DIAS BP: 79 MMHG
STRESS BASELINE HR: 75 BPM
STRESS BASELINE SYS BP: 208 MMHG
STRESS PEAK DIAS BP: 79 MMHG
STRESS PEAK SYS BP: 208 MMHG
STRESS PERCENT HR ACHIEVED: 48 %
STRESS POST PEAK HR: 78 BPM
STRESS RATE PRESSURE PRODUCT: NORMAL BPM*MMHG
STRESS TARGET HR: 162 BPM
TIBC SERPL-MCNC: 216 UG/DL (ref 250–450)
TRIGL SERPL-MCNC: 83 MG/DL (ref ?–150)
VLDLC SERPL CALC-MCNC: 16.6 MG/DL
WBC # BLD AUTO: 4.6 K/UL (ref 4.4–11.3)

## 2022-06-28 PROCEDURE — 36415 COLL VENOUS BLD VENIPUNCTURE: CPT

## 2022-06-28 PROCEDURE — 74011250637 HC RX REV CODE- 250/637: Performed by: HOSPITALIST

## 2022-06-28 PROCEDURE — A9500 TC99M SESTAMIBI: HCPCS

## 2022-06-28 PROCEDURE — 74011000258 HC RX REV CODE- 258: Performed by: NURSE PRACTITIONER

## 2022-06-28 PROCEDURE — 93017 CV STRESS TEST TRACING ONLY: CPT

## 2022-06-28 PROCEDURE — 80069 RENAL FUNCTION PANEL: CPT

## 2022-06-28 PROCEDURE — 97161 PT EVAL LOW COMPLEX 20 MIN: CPT

## 2022-06-28 PROCEDURE — G0378 HOSPITAL OBSERVATION PER HR: HCPCS

## 2022-06-28 PROCEDURE — 74011636637 HC RX REV CODE- 636/637: Performed by: HOSPITALIST

## 2022-06-28 PROCEDURE — 82962 GLUCOSE BLOOD TEST: CPT

## 2022-06-28 PROCEDURE — 74011250636 HC RX REV CODE- 250/636: Performed by: EMERGENCY MEDICINE

## 2022-06-28 PROCEDURE — 97116 GAIT TRAINING THERAPY: CPT

## 2022-06-28 PROCEDURE — 74011250636 HC RX REV CODE- 250/636: Performed by: HOSPITALIST

## 2022-06-28 PROCEDURE — 83735 ASSAY OF MAGNESIUM: CPT

## 2022-06-28 PROCEDURE — 85025 COMPLETE CBC W/AUTO DIFF WBC: CPT

## 2022-06-28 PROCEDURE — 74011250636 HC RX REV CODE- 250/636: Performed by: NURSE PRACTITIONER

## 2022-06-28 PROCEDURE — 74011000250 HC RX REV CODE- 250: Performed by: EMERGENCY MEDICINE

## 2022-06-28 RX ORDER — TETRAKIS(2-METHOXYISOBUTYLISOCYANIDE)COPPER(I) TETRAFLUOROBORATE 1 MG/ML
30 INJECTION, POWDER, LYOPHILIZED, FOR SOLUTION INTRAVENOUS
Status: COMPLETED | OUTPATIENT
Start: 2022-06-28 | End: 2022-06-28

## 2022-06-28 RX ORDER — HYDRALAZINE HYDROCHLORIDE 100 MG/1
100 TABLET, FILM COATED ORAL 3 TIMES DAILY
Qty: 90 TABLET | Refills: 0 | Status: SHIPPED | OUTPATIENT
Start: 2022-06-28

## 2022-06-28 RX ORDER — TETRAKIS(2-METHOXYISOBUTYLISOCYANIDE)COPPER(I) TETRAFLUOROBORATE 1 MG/ML
10 INJECTION, POWDER, LYOPHILIZED, FOR SOLUTION INTRAVENOUS
Status: COMPLETED | OUTPATIENT
Start: 2022-06-28 | End: 2022-06-28

## 2022-06-28 RX ORDER — FUROSEMIDE 40 MG/1
40 TABLET ORAL
Qty: 60 TABLET | Refills: 0 | Status: SHIPPED | OUTPATIENT
Start: 2022-06-28 | End: 2022-08-04

## 2022-06-28 RX ADMIN — GLIPIZIDE 5 MG: 5 TABLET ORAL at 10:04

## 2022-06-28 RX ADMIN — SODIUM CHLORIDE, PRESERVATIVE FREE 10 ML: 5 INJECTION INTRAVENOUS at 05:26

## 2022-06-28 RX ADMIN — ADENOSINE: 3 INJECTION INTRAVENOUS at 08:45

## 2022-06-28 RX ADMIN — ATORVASTATIN CALCIUM 40 MG: 40 TABLET, FILM COATED ORAL at 10:04

## 2022-06-28 RX ADMIN — ENOXAPARIN SODIUM 30 MG: 100 INJECTION SUBCUTANEOUS at 10:04

## 2022-06-28 RX ADMIN — FERROUS SULFATE TAB 325 MG (65 MG ELEMENTAL FE) 325 MG: 325 (65 FE) TAB at 10:03

## 2022-06-28 RX ADMIN — CARVEDILOL 25 MG: 12.5 TABLET, FILM COATED ORAL at 10:03

## 2022-06-28 RX ADMIN — TETRAKIS(2-METHOXYISOBUTYLISOCYANIDE)COPPER(I) TETRAFLUOROBORATE 10 MILLICURIE: 1 INJECTION, POWDER, LYOPHILIZED, FOR SOLUTION INTRAVENOUS at 06:42

## 2022-06-28 RX ADMIN — TETRAKIS(2-METHOXYISOBUTYLISOCYANIDE)COPPER(I) TETRAFLUOROBORATE 30 MILLICURIE: 1 INJECTION, POWDER, LYOPHILIZED, FOR SOLUTION INTRAVENOUS at 07:45

## 2022-06-28 RX ADMIN — AMLODIPINE BESYLATE 10 MG: 10 TABLET ORAL at 10:04

## 2022-06-28 RX ADMIN — FUROSEMIDE 40 MG: 10 INJECTION, SOLUTION INTRAMUSCULAR; INTRAVENOUS at 10:04

## 2022-06-28 RX ADMIN — SODIUM BICARBONATE 1300 MG: 650 TABLET ORAL at 10:04

## 2022-06-28 RX ADMIN — HYDRALAZINE HYDROCHLORIDE 100 MG: 100 TABLET, FILM COATED ORAL at 10:04

## 2022-06-28 RX ADMIN — ALOGLIPTIN 6.25 MG: 6.25 TABLET, FILM COATED ORAL at 10:04

## 2022-06-28 RX ADMIN — INSULIN LISPRO 3 UNITS: 100 INJECTION, SOLUTION INTRAVENOUS; SUBCUTANEOUS at 12:11

## 2022-06-28 NOTE — ROUTINE PROCESS
BP has trended down since BP meds given last night. Pt currently NPO. Encouraged to bath this am, wants to wait nearer to time of procedure. New gown and linens in room CHG washcloths given for bath. No chest apin this shift.

## 2022-06-28 NOTE — ROUTINE PROCESS
Patient Name: Emi Voss  :1960  59 y.o.    Date of Admission: 2019  Date of Consultation:  19  Encounter Provider: Ivania Gresham RN  Place of Service: [facility] CARDIOLOGY  Referring Provider: Harley Dumont MD  Patient Care Team:  Provider, No Known as PCP - General      Chief complaint: Acute Congestive Heart Failure    Reason for Consult: Dyspnea    History of Present Illness:    Ms Voss id a 59 year old patient with a history of uncontrolled diabetes, COPD, and tobacco use.  She was presented to the ED yesterday with increased SOA, cough, wheezing  and chest pain.  She has no prior cardiac history.  EMS was called and she was given IV steroids and breathing treatment en route to hospital. Her O2 sats were 84% at that time and was placed on 10 L high flow cannula. On arrival to the ED CXR showed cardiomegaly with pulmonary vascular congestion with mild edema.  EKG showed sinus tachycardia with . Labs included BNP 2158, troponin negative, K 4.0, BUN/Creat 16/0.56, WBC 11.51, Hgb 16.  ABG showed pO2 81, pCO2 80, pH 7.28. Her blood sugars were found to be very elevated in 300-400 range.  Respiratory panel was positive for rhinovirus and enterovirus. She was given lasix 80 mg IV for her volume overload and she is now receiving lasix 40 mg IV BID. Her I/O is not recorded but she is now done to 6 L high flow cannula.     She denies chest pain or pressure.  She states she has been coughing productive phlegm for many days now.  She describes orthopnea, however says this is been going on for at least 10 years.  She denies PND.  She denies lower extremity edema.  She admits to medication noncompliance with her diabetes.  She states she is never had a stress test or cardiac catheterization.  No family history of premature coronary disease.           ECHO has been done but not read yet    Past Medical History:   Diagnosis Date   • Acute  LPN assessment reviewed "respiratory failure with hypoxia (CMS/[facility]) 2019   • COPD (chronic obstructive pulmonary disease) (CMS/[facility])    • Diabetes mellitus (CMS/[facility])    • Hyponatremia 2019   • Noncompliance 2019   • Rhinovirus infection 2019   • Tachycardia 2019   • Type 2 diabetes mellitus with hyperglycemia (CMS/[facility]) 2019       Past Surgical History:   Procedure Laterality Date   •  SECTION     • HYSTERECTOMY           Prior to Admission medications    Not on File       No Known Allergies    Social History     Socioeconomic History   • Marital status:      Spouse name: Not on file   • Number of children: Not on file   • Years of education: Not on file   • Highest education level: Not on file   Tobacco Use   • Smoking status: Current Every Day Smoker     Packs/day: 1.00     Types: Cigarettes   Substance and Sexual Activity   • Alcohol use: No     Frequency: Never   • Drug use: No   • Sexual activity: Defer       History reviewed. No pertinent family history.    REVIEW OF SYSTEMS:   All systems reviewed.  Pertinent positives identified in HPI.  All other systems are negative.      Objective:     Vitals:    19 0348 19 0500 19 0702 19 0808   BP:   126/70    BP Location:       Patient Position:       Pulse: 87  87 89   Resp: 22   20   Temp:       TempSrc:       SpO2: 94%   91%   Weight:  72.9 kg (160 lb 12.8 oz) 72.6 kg (160 lb)    Height:   162.6 cm (64\")      Body mass index is 27.46 kg/m².    General Appearance:    Alert, cooperative, in no acute distress   Head:    Normocephalic, without obvious abnormality, atraumatic   Eyes:            Lids and lashes normal, conjunctivae and sclerae normal, no   icterus, no pallor, corneas clear, PERRLA   Ears:    Ears appear intact with no abnormalities noted   Throat:   No oral lesions, no thrush, oral mucosa moist   Neck:   No adenopathy, supple, trachea midline, no thyromegaly, no   carotid bruit, no JVD   Back:     No kyphosis " present, no scoliosis present, no skin lesions, erythema or scars, no tenderness to percussion or palpation, range of motion normal   Lungs:    Rhonchi, wheezes throughout, poor air movement.    Heart:   Difficult to assess heart sounds through her lung sounds.   Chest Wall:    No abnormalities observed   Abdomen:     Normal bowel sounds, no masses, no organomegaly, soft        non-tender, non-distended, no guarding, no rebound  tenderness   Extremities:   Moves all extremities well, no edema, no cyanosis, no redness   Pulses:   Pulses palpable and equal bilaterally. Normal radial, carotid, femoral, dorsalis pedis and posterior tibial pulses bilaterally. Normal abdominal aorta   Skin:  Psychiatric:   No bleeding, bruising or rash    Alert and oriented x 3, normal mood and affect   Lab Review:     Results from last 7 days   Lab Units 05/13/19  0448   SODIUM mmol/L 137   POTASSIUM mmol/L 3.8   CHLORIDE mmol/L 91*   CO2 mmol/L 32.4*   BUN mg/dL 27*   CREATININE mg/dL 0.76   CALCIUM mg/dL 9.3   BILIRUBIN mg/dL 0.2   ALK PHOS U/L 110   ALT (SGPT) U/L 18   AST (SGOT) U/L 14   GLUCOSE mg/dL 406*     Results from last 7 days   Lab Units 05/12/19  0801   TROPONIN T ng/mL <0.010     Results from last 7 days   Lab Units 05/13/19  0448   WBC 10*3/mm3 10.28   HEMOGLOBIN g/dL 15.4   HEMATOCRIT % 51.0*   PLATELETS 10*3/mm3 258                         CXR  IMPRESSION:  Cardiomegaly with pulmonary vascular congestion, mild edema.  Follow-up suggested.    Telemetry      EKG        I personally viewed and interpreted the patient's EKG/Telemetry data.  Sinus tachycardia, normal axis, poor R wave progression which may represent prior anterior MI.  Possible right atrial enlargement.        Assessment and Plan:       This is a 59-year-old female who presents with acute hypercapnic and hypoxic respiratory failure.    1.  Pulmonary edema:  -Systolic function is normal on my preliminary read of the echocardiogram.  Diastolic function is  abnormal, grade 2.  Filling pressures appear normal on echo.  -Agree with gentle diuresis, but I do not think she is massively overloaded.     2.  Hypercapnic respiratory failure: Related to COPD and viral infection.  Treatment per pulmonology team.    3.  Abnormal EKG:  Poor R wave progression suggesting possible prior anterior MI.  When she is more stable we consider nuclear stress test.  She is currently asymptomatic.  4.  Diabetes: Uncontrolled    Thank you for including me in the care of this patient. Will continue to follow. Please call with any further questions or concerns.

## 2022-06-28 NOTE — PROGRESS NOTES
Care Management Interventions  PCP Verified by CM: Yes  Mode of Transport at Discharge: Other (see comment) (Daughter)  Transition of Care Consult (CM Consult): Discharge Planning,Other (Referral was sent to cardiac rehab.)  Physical Therapy Consult: Yes  Support Systems: Child(amy)  Confirm Follow Up Transport: Self  The Plan for Transition of Care is Related to the Following Treatment Goals : Patient is being discharged home with outpatient follow up with his PCP and a referral to cardiac rehab. Discharge Location  Patient Expects to be Discharged to[de-identified] Home    Also has a follow up with cardiology and nephrology.

## 2022-06-28 NOTE — DISCHARGE INSTRUCTIONS
If recurrence of symptoms come back to the emergency room for reevaluation  -Monitor daily weights and keep a log if greater than 3 pound weight gain in 24 hours or greater than pound weight gain in 5 days take extra dose of 40 mg of Lasix and inform nephrology/cardiology  -Moderate daily vital signs prior to meds in a.m. and randomly with repeat checks throughout the day and keep log  -Continue with strict low-salt fluid restricted diet of 1.5 L  -Stay away from frozen foods and processed/canned foods and check salt content in each  Patient Education      activity as tolerated  Angina: Care Instructions  Your Care Instructions     You have a problem called angina. Angina happens when there is not enough blood flow to your heart muscle. Angina is a sign of coronary artery disease (CAD). CAD occurs when blood vessels that supply the heart become narrowed. Having CAD increases your risk of a heart attack. Chest pain or pressure is the most common symptom of angina. But some people have other symptoms, like:  · Pain, pressure, or a strange feeling in the back, neck, jaw, or upper belly, or in one or both shoulders or arms. · Shortness of breath. · Nausea or vomiting. · Lightheadedness or sudden weakness. · Fast or irregular heartbeat. Women are somewhat more likely than men to have angina symptoms like shortness of breath, nausea, and back or jaw pain. Angina can be dangerous. That's why it is important to pay attention to your symptoms. Know what is typical for you, learn how to control your symptoms, and understand when you need to get treatment. A change in your usual pattern of symptoms is an emergency. It may mean that you are having a heart attack. The doctor has checked you carefully, but problems can develop later. If you notice any problems or new symptoms, get medical treatment right away. Follow-up care is a key part of your treatment and safety.  Be sure to make and go to all appointments, and call your doctor if you are having problems. It's also a good idea to know your test results and keep a list of the medicines you take. How can you care for yourself at home? Medicines    · If your doctor has given you nitroglycerin for angina symptoms, keep it with you at all times. If you have symptoms, sit down and rest, and take the first dose of nitroglycerin as directed. If your symptoms get worse or are not getting better within 5 minutes, call 911 right away. Stay on the phone. The emergency  will give you further instructions.     · If your doctor advises it, take 1 low-dose aspirin a day to prevent heart attack.     · Be safe with medicines. Take your medicines exactly as prescribed. Call your doctor if you think you are having a problem with your medicine. You will get more details on the specific medicines your doctor prescribes. Lifestyle changes    · Do not smoke. If you need help quitting, talk to your doctor about stop-smoking programs and medicines. These can increase your chances of quitting for good.     · Eat a heart-healthy diet that is low in saturated fat and salt, and is high in fiber. Talk to your doctor or a dietitian about healthy eating.     · Stay at a healthy weight. Or lose weight if you need to. Activity    · Talk to your doctor about a level of activity that is safe for you.     · If an activity causes angina symptoms, stop and rest.   When should you call for help? Call 911 anytime you think you may need emergency care. For example, call if:    · You passed out (lost consciousness).     · You have symptoms of a heart attack. These may include:  ? Chest pain or pressure, or a strange feeling in the chest.  ? Sweating. ? Shortness of breath. ? Nausea or vomiting. ? Pain, pressure, or a strange feeling in the back, neck, jaw, or upper belly or in one or both shoulders or arms. ? Lightheadedness or sudden weakness. ? A fast or irregular heartbeat.   After you call 911, the  may tell you to chew 1 adult-strength or 2 to 4 low-dose aspirin. Wait for an ambulance. Do not try to drive yourself.     · You have angina symptoms that do not go away with rest or are not getting better within 5 minutes after you take a dose of nitroglycerin. Call your doctor now if:    · Your angina symptoms seem worse but still follow your typical pattern. You can predict when symptoms will happen, but they may come on sooner, feel worse, or last longer.     · You feel dizzy or lightheaded, or you feel like you may faint. Watch closely for changes in your health, and be sure to contact your doctor if you have any problems. Where can you learn more? Go to http://www.gray.com/  Enter H129 in the search box to learn more about \"Angina: Care Instructions. \"  Current as of: January 10, 2022               Content Version: 13.2  © 2006-2022 [employer]. Care instructions adapted under license by [employer] (which disclaims liability or warranty for this information). If you have questions about a medical condition or this instruction, always ask your healthcare professional. [place] [address] any warranty or liability for your use of this information. Patient Education        Home Blood Pressure Test: About This Test  What is it? A home blood pressure test allows you to keep track of your blood pressure at home. Blood pressure is a measure of the force of blood against the walls of your arteries. Blood pressure readings include two numbers, such as 130/80 (say \"130 over 80\"). The first number is the systolic pressure. The second number is the diastolic pressure. Why is this test done? You may do this test at home to:  · Find out if you have high blood pressure. · Track your blood pressure if you have high blood pressure. · Track how well medicine is working to reduce high blood pressure.   · Check how lifestyle changes, such as weight loss and exercise, are affecting blood pressure. How do you prepare for the test?  For at least 30 minutes before you take your blood pressure, don't exercise, drink caffeine, or smoke. Empty your bladder before the test. Sit quietly with your back straight and both feet on the floor for at least 5 minutes. This helps you take your blood pressure while you feel comfortable and relaxed. How is the test done? · If your doctor recommends it, take your blood pressure twice a day. Take it in the morning and evening. · Sit with your arm slightly bent and resting on a table so that your upper arm is at the same level as your heart. · Use the same arm each time you take your blood pressure. · Place the blood pressure cuff on the bare skin of your upper arm. You may have to roll up your sleeve, remove your arm from the sleeve, or take your shirt off. · Wrap the blood pressure cuff around your upper arm so that the lower edge of the cuff is about 1 inch above the bend of your elbow. · Do not move, talk, or text while you take your blood pressure. Follow the instructions that came with your blood pressure monitor. They might be different from the following. · Press the on/off button on the automatic monitor. Then you may need to wait until the screen says the monitor is ready. · Press the start button. The cuff will inflate and deflate by itself. · Your blood pressure numbers will appear on the screen. · Wait one minute and take your blood pressure again. · If your monitor does not automatically save your numbers, write them in your log book, along with the date and time. Follow-up care is a key part of your treatment and safety. Be sure to make and go to all appointments, and call your doctor if you are having problems. It's also a good idea to keep a list of the medicines you take. Where can you learn more?   Go to http://www.gray.com/  Enter C427 in the search box to learn more about \"Home Blood Pressure Test: About This Test.\"  Current as of: January 10, 2022               Content Version: 13.2  © 2006-2022 [employer]. Care instructions adapted under license by [employer] (which disclaims liability or warranty for this information). If you have questions about a medical condition or this instruction, always ask your healthcare professional. [address] any warranty or liability for your use of this information. Patient Education        Chest Pain: Care Instructions  Your Care Instructions     There are many things that can cause chest pain. Some are not serious and will get better on their own in a few days. But some kinds of chest pain need more testing and treatment. Your doctor may have recommended a follow-up visit in the next 8 to 12 hours. If you are not getting better, you may need more tests or treatment. Even though your doctor has released you, you still need to watch for any problems. The doctor carefully checked you, but sometimes problems can develop later. If you have new symptoms or if your symptoms do not get better, get medical care right away. If you have worse or different chest pain or pressure that lasts more than 5 minutes or you passed out (lost consciousness), call 911 or seek other emergency help right away. A medical visit is only one step in your treatment. Even if you feel better, you still need to do what your doctor recommends, such as going to all suggested follow-up appointments and taking medicines exactly as directed. This will help you recover and help prevent future problems. How can you care for yourself at home? · Rest until you feel better. · Take your medicine exactly as prescribed. Call your doctor if you think you are having a problem with your medicine. · Do not drive after taking a prescription pain medicine. When should you call for help?    Call 911 if:     · You passed out (lost consciousness).     · You have severe difficulty breathing.     · You have symptoms of a heart attack. These may include:  ? Chest pain or pressure, or a strange feeling in your chest.  ? Sweating. ? Shortness of breath. ? Nausea or vomiting. ? Pain, pressure, or a strange feeling in your back, neck, jaw, or upper belly or in one or both shoulders or arms. ? Lightheadedness or sudden weakness. ? A fast or irregular heartbeat. After you call 911, the  may tell you to chew 1 adult-strength or 2 to 4 low-dose aspirin. Wait for an ambulance. Do not try to drive yourself. Call your doctor today if:     · You have any trouble breathing.     · Your chest pain gets worse.     · You are dizzy or lightheaded, or you feel like you may faint.     · You are not getting better as expected.     · You are having new or different chest pain. Where can you learn more? Go to http://www.thurman.com/  Enter A120 in the search box to learn more about \"Chest Pain: Care Instructions. \"  Current as of: July 1, 2021               Content Version: 13.2  © 0559-9152 [employer]. Care instructions adapted under license by [employer] (which disclaims liability or warranty for this information). If you have questions about a medical condition or this instruction, always ask your healthcare professional. [place] [address] any warranty or liability for your use of this information. Patient Education        DASH Diet: Care Instructions  Your Care Instructions     The DASH diet is an eating plan that can help lower your blood pressure. DASH stands for Dietary Approaches to Stop Hypertension. Hypertension is high blood pressure. The DASH diet focuses on eating foods that are high in calcium, potassium, and magnesium. These nutrients can lower blood pressure.  The foods that are highest in these nutrients are fruits, vegetables, low-fat dairy products, nuts, seeds, and legumes. But taking calcium, potassium, and magnesium supplements instead of eating foods that are high in those nutrients does not have the same effect. The DASH diet also includes whole grains, fish, and poultry. The DASH diet is one of several lifestyle changes your doctor may recommend to lower your high blood pressure. Your doctor may also want you to decrease the amount of sodium in your diet. Lowering sodium while following the DASH diet can lower blood pressure even further than just the DASH diet alone. Follow-up care is a key part of your treatment and safety. Be sure to make and go to all appointments, and call your doctor if you are having problems. It's also a good idea to know your test results and keep a list of the medicines you take. How can you care for yourself at home? Following the DASH diet  · Eat 4 to 5 servings of fruit each day. A serving is 1 medium-sized piece of fruit, ½ cup chopped or canned fruit, 1/4 cup dried fruit, or 4 ounces (½ cup) of fruit juice. Choose fruit more often than fruit juice. · Eat 4 to 5 servings of vegetables each day. A serving is 1 cup of lettuce or raw leafy vegetables, ½ cup of chopped or cooked vegetables, or 4 ounces (½ cup) of vegetable juice. Choose vegetables more often than vegetable juice. · Get 2 to 3 servings of low-fat and fat-free dairy each day. A serving is 8 ounces of milk, 1 cup of yogurt, or 1 ½ ounces of cheese. · Eat 6 to 8 servings of grains each day. A serving is 1 slice of bread, 1 ounce of dry cereal, or ½ cup of cooked rice, pasta, or cooked cereal. Try to choose whole-grain products as much as possible. · Limit lean meat, poultry, and fish to 2 servings each day. A serving is 3 ounces, about the size of a deck of cards. · Eat 4 to 5 servings of nuts, seeds, and legumes (cooked dried beans, lentils, and split peas) each week.  A serving is 1/3 cup of nuts, 2 tablespoons of seeds, or ½ cup of cooked beans or peas. · Limit fats and oils to 2 to 3 servings each day. A serving is 1 teaspoon of vegetable oil or 2 tablespoons of salad dressing. · Limit sweets and added sugars to 5 servings or less a week. A serving is 1 tablespoon jelly or jam, ½ cup sorbet, or 1 cup of lemonade. · Eat less than 2,300 milligrams (mg) of sodium a day. If you limit your sodium to 1,500 mg a day, you can lower your blood pressure even more. · Be aware that all of these are the suggested number of servings for people who eat 1,800 to 2,000 calories a day. Your recommended number of servings may be different if you need more or fewer calories. Tips for success  · Start small. Do not try to make dramatic changes to your diet all at once. You might feel that you are missing out on your favorite foods and then be more likely to not follow the plan. Make small changes, and stick with them. Once those changes become habit, add a few more changes. · Try some of the following:  ? Make it a goal to eat a fruit or vegetable at every meal and at snacks. This will make it easy to get the recommended amount of fruits and vegetables each day. ? Try yogurt topped with fruit and nuts for a snack or healthy dessert. ? Add lettuce, tomato, cucumber, and onion to sandwiches. ? Combine a ready-made pizza crust with low-fat mozzarella cheese and lots of vegetable toppings. Try using tomatoes, squash, spinach, broccoli, carrots, cauliflower, and onions. ? Have a variety of cut-up vegetables with a low-fat dip as an appetizer instead of chips and dip. ? Sprinkle sunflower seeds or chopped almonds over salads. Or try adding chopped walnuts or almonds to cooked vegetables. ? Try some vegetarian meals using beans and peas. Add garbanzo or kidney beans to salads. Make burritos and tacos with mashed rodriguez beans or black beans. Where can you learn more?   Go to http://www.gray.com/  Enter H967 in the search box to learn more about \"DASH Diet: Care Instructions. \"  Current as of: January 10, 2022               Content Version: 13.2  © 2006-2022 [employer]. Care instructions adapted under license by [employer] (which disclaims liability or warranty for this information). If you have questions about a medical condition or this instruction, always ask your healthcare professional. [address] any warranty or liability for your use of this information. Patient Education        Learning About Diuretics for High Blood Pressure  Overview  Diuretics help to lower blood pressure. This reduces your risk of a heart attack and stroke. It also reduces your risk of kidney disease. Diuretics cause your kidneys to remove sodium and water. They also relax the blood vessel walls. These help lower your blood pressure. Examples  · Chlorthalidone  · Hydrochlorothiazide  Possible side effects  There are some common side effects. They are:  · Too little potassium. · Feeling dizzy. · Rash. · Urinating a lot. · High blood sugar. (But this is not common.)  You may have other side effects. Check the information that comes with your medicine. What to know about taking this medicine  · You may take other medicines for blood pressure. Diuretics can help those work better. They can also prevent extra fluid in your body. · You may need to take potassium pills. Ask your doctor about this. · You may need blood tests to check on your health. For example, you may have tests to check your kidneys and your potassium level. · Take your medicines exactly as prescribed. Call your doctor if you think you are having a problem with your medicine. · Check with your doctor or pharmacist before you use any other medicines. This includes over-the-counter medicines. Make sure your doctor knows all of the medicines, vitamins, herbal products, and supplements you take.  Taking some medicines together can cause problems. Where can you learn more? Go to http://rina-karina.info/  Enter M110 in the search box to learn more about \"Learning About Diuretics for High Blood Pressure. \"  Current as of: January 10, 2022               Content Version: 13.2  © 2006-2022 [employer]. Care instructions adapted under license by [employer] (which disclaims liability or warranty for this information). If you have questions about a medical condition or this instruction, always ask your healthcare professional. [place] [address] any warranty or liability for your use of this information. Patient Education        Acute High Blood Pressure: Care Instructions  Your Care Instructions     Acute high blood pressure is very high blood pressure. It's a serious problem. Very high blood pressure can damage your brain, heart, eyes, and kidneys. You may have been given medicines to lower your blood pressure. You may have gotten them as pills or through a needle in one of your veins. This is called an IV. And maybe you were given other medicines too. These can be needed when high blood pressure causes other problems. To keep your blood pressure at a lower level, you may need to make healthy lifestyle changes. And you will probably need to take medicines. Be sure to follow up with your doctor about your blood pressure and what you can do about it. Follow-up care is a key part of your treatment and safety. Be sure to make and go to all appointments, and call your doctor if you are having problems. It's also a good idea to know your test results and keep a list of the medicines you take. How can you care for yourself at home? · See your doctor as often as he or she recommends. This is to make sure your blood pressure is under control. · Take your blood pressure medicine exactly as prescribed. You may take one or more types.  They include diuretics, beta-blockers, ACE inhibitors, calcium channel blockers, and angiotensin II receptor blockers. Call your doctor if you think you are having a problem with your medicine. · If you take blood pressure medicine, talk to your doctor before you take decongestants or anti-inflammatory medicine, such as ibuprofen. These can raise blood pressure. · Learn how to check your blood pressure at home. Check it often. · Ask your doctor if it's okay to drink alcohol. · Talk to your doctor about lifestyle changes that can help blood pressure. These include being active and managing your weight. · Don't smoke. Smoking increases your risk for heart attack and stroke. When should you call for help? Call 911  anytime you think you may need emergency care. This may mean having symptoms that suggest that your blood pressure is causing a serious heart or blood vessel problem. Your blood pressure may be over 180/120. For example, call 911 if:    · You have symptoms of a heart attack. These may include:  ? Chest pain or pressure, or a strange feeling in the chest.  ? Sweating. ? Shortness of breath. ? Nausea or vomiting. ? Pain, pressure, or a strange feeling in the back, neck, jaw, or upper belly or in one or both shoulders or arms. ? Lightheadedness or sudden weakness. ? A fast or irregular heartbeat.     · You have symptoms of a stroke. These may include:  ? Sudden numbness, tingling, weakness, or loss of movement in your face, arm, or leg, especially on only one side of your body. ? Sudden vision changes. ? Sudden trouble speaking. ? Sudden confusion or trouble understanding simple statements. ? Sudden problems with walking or balance. ? A sudden, severe headache that is different from past headaches.     · You have severe back or belly pain. Do not wait until your blood pressure comes down on its own. Get help right away.   Call your doctor now or seek immediate care if:    · Your blood pressure is much higher than normal (such as 180/120 or higher), but you don't have symptoms.     · You think high blood pressure is causing symptoms, such as:  ? Severe headache.  ? Blurry vision. Watch closely for changes in your health, and be sure to contact your doctor if:    · Your blood pressure measures higher than your doctor recommends at least 2 times. That means the top number is higher or the bottom number is higher, or both.     · You think you may be having side effects from your blood pressure medicine. Where can you learn more? Go to http://www.gray.com/  Enter H919 in the search box to learn more about \"Acute High Blood Pressure: Care Instructions. \"  Current as of: January 10, 2022               Content Version: 13.2  © 2006-2022 [employer]. Care instructions adapted under license by [employer] (which disclaims liability or warranty for this information). If you have questions about a medical condition or this instruction, always ask your healthcare professional. [place] [address] any warranty or liability for your use of this information. Patient Education        Learning About High Blood Pressure  What is high blood pressure? Blood pressure is a measure of how hard the blood pushes against the walls of your arteries. It's normal for blood pressure to go up and down throughout the day. But if it stays up, you have high blood pressure. Another name for high blood pressure is hypertension. Two numbers tell you your blood pressure. The first number is the systolic pressure (top number). It shows how hard the blood pushes when your heart is pumping. The second number is the diastolic pressure (bottom number). It shows how hard the blood pushes between heartbeats, when your heart is relaxed and filling with blood. Your doctor will give you a goal for your blood pressure based on your health and your age.  High blood pressure (hypertension) means that the top number stays high, or the bottom number stays high, or both. High blood pressure increases the risk of stroke, heart attack, and other problems. What happens when you have high blood pressure? · Blood flows through your arteries with too much force. Over time, this can damage the heart and the walls of your arteries. But you can't feel it. High blood pressure usually doesn't cause symptoms. · High blood pressure makes your heart work harder. And that can lead to heart failure, which means your heart doesn't pump as much blood as your body needs. · Fat and calcium start to build up in your arteries. This buildup is called hardening of the arteries. It can cause many problems including a heart attack and stroke. · Arteries also carry blood and oxygen to organs like your eyes, kidneys, and brain. If high blood pressure damages those arteries, it can lead to vision loss, kidney disease, stroke, and a higher risk of dementia. How can you prevent high blood pressure? · Stay at a healthy weight. · Try to limit how much sodium you eat to less than 2,300 milligrams (mg) a day. If you limit your sodium to 1,500 mg a day, you can lower your blood pressure even more. ? Buy foods that are labeled \"unsalted,\" \"sodium-free,\" or \"low-sodium. \" Foods labeled \"reduced-sodium\" and \"light sodium\" may still have too much sodium. ? Flavor your food with garlic, lemon juice, onion, vinegar, herbs, and spices instead of salt. Do not use soy sauce, steak sauce, onion salt, garlic salt, mustard, or ketchup on your food. ? Use less salt (or none) when recipes call for it. You can often use half the salt a recipe calls for without losing flavor. · Be physically active. Get at least 30 minutes of exercise on most days of the week. Walking is a good choice. You also may want to do other activities, such as running, swimming, cycling, or playing tennis or team sports. · Limit alcohol to 2 drinks a day for men and 1 drink a day for women.   · Eat plenty of fruits, vegetables, and low-fat dairy products. Eat less saturated and total fats. How is high blood pressure treated? · Your doctor will suggest making lifestyle changes to help your heart. For example, your doctor may ask you to eat healthy foods, quit smoking, lose extra weight, and be more active. · If lifestyle changes don't help enough, your doctor may recommend that you take medicine. · When blood pressure is very high, medicines are needed to lower it. Follow-up care is a key part of your treatment and safety. Be sure to make and go to all appointments, and call your doctor if you are having problems. It's also a good idea to know your test results and keep a list of the medicines you take. Where can you learn more? Go to http://rina-karina.info/  Enter P501 in the search box to learn more about \"Learning About High Blood Pressure. \"  Current as of: January 10, 2022               Content Version: 13.2  © 2006-2022 [employer]. Care instructions adapted under license by [employer] (which disclaims liability or warranty for this information). If you have questions about a medical condition or this instruction, always ask your healthcare professional. [place] [address] any warranty or liability for your use of this information. Patient Education        Kidney Disease and High Blood Pressure: Care Instructions  Overview     Long-term (chronic) kidney disease happens when the kidneys cannot remove waste and keep your body's fluids and chemicals in balance. Usually, the kidneys remove waste from the blood through the urine. When the kidneys are not working well, waste can build up so much that it poisons the body. Kidney disease can make you very tired. It also can cause swelling, or edema, in your legs or other areas of your body. High blood pressure is one of the major causes of chronic kidney disease.  And kidney disease can also cause high blood pressure. No matter which came first, having high blood pressure damages the tiny blood vessels in the kidneys. If you have high blood pressure, it is important to lower it. There are many things you can do to lower your blood pressure, which may help slow or stop the damage to your kidneys. Follow-up care is a key part of your treatment and safety. Be sure to make and go to all appointments, and call your doctor if you are having problems. It's also a good idea to know your test results and keep a list of the medicines you take. How can you care for yourself at home? · Be safe with medicines. Take your medicines exactly as prescribed. Call your doctor if you have any problems with your medicine. You will probably need more than one medicine to lower your blood pressure. You will get more details on the specific medicines your doctor prescribes. · Work with your doctor and a dietitian to plan meals that have the right amount of nutrients for you. You will probably have to limit salt, fluids, and protein. · Stay at a healthy weight. This is very important if you put on weight around the waist. Losing even 10 pounds can help you lower your blood pressure. · Manage other health problems such as diabetes and high cholesterol. You can help lower your risk for heart disease and blood vessel problems with a healthy lifestyle along with medicines. · Do not take ibuprofen (Advil, Motrin) or naproxen (Aleve), or similar medicines, unless your doctor tells you to. They may make chronic kidney disease worse. It is okay to take acetaminophen (Tylenol). · If your doctor recommends it, get more exercise. Walking is a good choice. Bit by bit, increase the amount you walk every day. Try for at least 30 minutes on most days of the week. You also may want to swim, bike, or do other activities. · Limit or avoid alcohol. Talk to your doctor about whether you can drink any alcohol.   · Do not smoke or allow others to smoke around you. If you need help quitting, talk to your doctor about stop-smoking programs and medicines. These can increase your chances of quitting for good. When should you call for help? Call 911 anytime you think you may need emergency care. For example, call if:    · You passed out (lost consciousness). Call your doctor now or seek immediate medical care if:    · You have new or worse nausea and vomiting.     · You have much less urine than normal, or you have no urine.     · You are feeling confused or cannot think clearly.     · You have new or more blood in your urine.     · You have new swelling.     · You are dizzy or lightheaded, or you feel like you may faint. Watch closely for changes in your health, and be sure to contact your doctor if:    · You do not get better as expected. Where can you learn more? Go to http://www.gray.com/  Enter Q424 in the search box to learn more about \"Kidney Disease and High Blood Pressure: Care Instructions. \"  Current as of: September 8, 2021               Content Version: 13.2  © 2006-2022 [employer]. Care instructions adapted under license by [employer] (which disclaims liability or warranty for this information). If you have questions about a medical condition or this instruction, always ask your healthcare professional. [address] any warranty or liability for your use of this information. Patient Education        Low Sodium Diet (2,000 Milligram): Care Instructions  Overview     Limiting sodium can be an important part of managing some health problems. The most common source of sodium is salt. People get most of the salt in their diet from canned, prepared, and packaged foods. Fast food and restaurant meals also are very high in sodium. Your doctor will probably limit your sodium to less than 2,000 milligrams (mg) a day.  This limit counts all the sodium in prepared and packaged foods and any salt you add to your food. Follow-up care is a key part of your treatment and safety. Be sure to make and go to all appointments, and call your doctor if you are having problems. It's also a good idea to know your test results and keep a list of the medicines you take. How can you care for yourself at home? Read food labels  · Read labels on cans and food packages. The labels tell you how much sodium is in each serving. Make sure that you look at the serving size. If you eat more than the serving size, you have eaten more sodium. · Food labels also tell you the Percent Daily Value for sodium. Choose products with low Percent Daily Values for sodium. · Be aware that sodium can come in forms other than salt, including monosodium glutamate (MSG), sodium citrate, and sodium bicarbonate (baking soda). MSG is often added to Asian food. When you eat out, you can sometimes ask for food without MSG or added salt. Buy low-sodium foods  · Buy foods that are labeled \"unsalted\" (no salt added), \"sodium-free\" (less than 5 mg of sodium per serving), or \"low-sodium\" (140 mg or less of sodium per serving). Foods labeled \"reduced-sodium\" and \"light sodium\" may still have too much sodium. Be sure to read the label to see how much sodium you are getting. · Buy fresh vegetables, or frozen vegetables without added sauces. Buy low-sodium versions of canned vegetables, soups, and other canned goods. Prepare low-sodium meals  · Cut back on the amount of salt you use in cooking. This will help you adjust to the taste. Do not add salt after cooking. One teaspoon of salt has about 2,300 mg of sodium. · Take the salt shaker off the table. · Flavor your food with garlic, lemon juice, onion, vinegar, herbs, and spices. Do not use soy sauce, lite soy sauce, steak sauce, onion salt, garlic salt, celery salt, or ketchup on your food. · Use low-sodium salad dressings, sauces, and ketchup.  Or make your own salad dressings and sauces without adding salt. · Use less salt (or none) when recipes call for it. You can often use half the salt a recipe calls for without losing flavor. Other foods such as rice, pasta, and grains do not need added salt. · Rinse canned vegetables, and cook them in fresh water. This removes some--but not all--of the salt. · Avoid water that is naturally high in sodium or that has been treated with water softeners, which add sodium. If you buy bottled water, read the label and choose a sodium-free brand. Avoid high-sodium foods  · Avoid eating:  ? Smoked, cured, salted, and canned meat, fish, and poultry. ? Ham, braga, hot dogs, and luncheon meats. ? Regular, hard, and processed cheese and regular peanut butter. ? Crackers with salted tops, and other salted snack foods such as pretzels, chips, and salted popcorn. ? Frozen prepared meals, unless labeled low-sodium. ? Canned and dried soups, broths, and bouillon, unless labeled sodium-free or low-sodium. ? Canned vegetables, unless labeled sodium-free or low-sodium. ? Western Adri fries, pizza, tacos, and other fast foods. ? Pickles, olives, ketchup, and other condiments, especially soy sauce, unless labeled sodium-free or low-sodium. Where can you learn more? Go to http://www.gray.com/  Enter V843 in the search box to learn more about \"Low Sodium Diet (2,000 Milligram): Care Instructions. \"  Current as of: September 8, 2021               Content Version: 13.2  © 1284-9323 [employer]. Care instructions adapted under license by [employer] (which disclaims liability or warranty for this information). If you have questions about a medical condition or this instruction, always ask your healthcare professional. [place] any warranty or liability for your use of this information.          Patient Education        Medicines to Avoid With Kidney Disease: Care Instructions  Overview     Kidney disease means that your kidneys are not able to get rid of waste from the blood. So they can't keep your body's fluids and chemicals in balance. Usually, the kidneys get rid of waste from the blood through the urine. And they balance the fluids in the body. When your kidneys don't work as they should, you have to be careful about some medicines. They may harm your kidneys. Your doctor may tell you not to take them or may change the dose. Medicines for pain and swelling, such as ibuprofen (Advil or Motrin) or naproxen (Aleve), can cause harm. So can some antibiotics and antacids. And you need to be careful about some drugs that treat cancer, lower blood pressure, or get rid of water from the body. Some herbal products could cause harm too. Follow-up care is a key part of your treatment and safety. Be sure to make and go to all appointments, and call your doctor if you are having problems. It's also a good idea to know your test results and keep a list of the medicines you take. How can you care for yourself at home? · Tell your doctor all the prescription, herbal, or over-the-counter medicines you take. Do not take any new ones unless you talk to your doctor first.  · Do not take anti-inflammatory medicines. These include ibuprofen (Advil, Motrin) and naproxen (Aleve). You can use acetaminophen (Tylenol) for pain. · Do not take two or more pain medicines at the same time unless the doctor told you to. Many pain medicines have acetaminophen, which is Tylenol. Too much acetaminophen (Tylenol) can be harmful. · Tell all doctors and others who work with your health care that you have kidney disease. · Wear medical alert jewelry that lists your health problem. You can buy this at most [employer]. Where can you learn more?   Go to http://rina-karina.info/  Enter Q007 in the search box to learn more about \"Medicines to Avoid With Kidney Disease: Care Instructions. \"  Current as of: September 8, 2021               Content Version: 13.2  © 3803-6323 [employer]. Care instructions adapted under license by [employer] (which disclaims liability or warranty for this information). If you have questions about a medical condition or this instruction, always ask your healthcare professional. [address] any warranty or liability for your use of this information. Patient Education        Diet for Chronic Kidney Disease (Before Dialysis): Care Instructions  Overview  When you have chronic kidney disease, you need to change your diet to avoid foods that make your kidneys worse. You may need to limit salt, fluids, and protein. You also may need to limit minerals such as potassium and phosphorus. A diet for chronic kidney disease takes planning. A dietitian who specializes in kidney disease can help you plan meals that meet your needs. These guidelines are for people who are not on dialysis. Talk with your doctor or dietitian to make sure your diet is right for your condition. Do not change your diet without talking to your doctor or dietitian. Follow-up care is a key part of your treatment and safety. Be sure to make and go to all appointments, and call your doctor if you are having problems. It's also a good idea to know your test results and keep a list of the medicines you take. How can you care for yourself at home? · Work with your doctor or dietitian to create a food plan. · Do not skip meals or go for many hours without eating. If you do not feel very hungry, try to eat 4 or 5 small meals instead of 1 or 2 big meals. · If you have a hard time eating enough, talk to your doctor or dietitian about ways you can add calories to your diet.   · Do not take any vitamins or minerals, supplements, or herbal products without talking to your doctor first.  · Check with your doctor about whether it is safe for you to drink alcohol. To get the right amount of protein  · Ask your doctor or dietitian how much protein you can have each day. Most people with chronic kidney disease need to limit the amount of protein they eat. But you still need some protein to stay healthy. · Include all sources of protein in your daily protein count. Besides meat, poultry, fish, and eggs, protein is found in milk and milk products, beans and nuts, breads, cereals, and vegetables. To limit salt  · Read food labels on cans and food packages. The labels tell you how much sodium is in each serving. Make sure that you look at the serving size. If you eat more than the serving size, you will get more sodium than what is listed on the label. · Do not add salt to your food. · Buy foods that are labeled \"no salt added,\" \"sodium-free,\" or \"low-sodium. \" Foods labeled \"reduced-sodium\" and \"light sodium\" may still have too much sodium. · Limit processed foods, fast food, and restaurant foods. These types of food are very high in sodium. · Avoid salted pretzels, chips, popcorn, and other salted snacks. · Avoid smoked, cured, salted, and canned meat, fish, and poultry. This includes ham, braga, hot dogs, and luncheon meats. · You may use lemon, herbs, and spices to flavor your meals. To limit potassium  · Choose low-potassium fruits such as applesauce, pineapple, grapes, blueberries, and raspberries. · Choose low-potassium vegetables such as lettuce, green beans, cucumber, and radishes. · Choose low-potassium foods such as pasta, noodles, rice, tortillas, and bagels. · Limit or avoid high-potassium foods such as milk, bananas, oranges, cantaloupe, potatoes, spinach, tomatoes, broccoli, and sweet potatoes. · Do not use a salt substitute or lite salt unless your doctor says it is okay. Most salt substitutes and lite salts are high in potassium. To limit phosphorus  · Follow your food plan to know how much milk and milk products you can have.   · Limit nuts, peanut butter, seeds, lentils, beans, organ meats, and sardines. · Avoid cola drinks. · Avoid bran breads or bran cereals. If you need to limit fluids  · Know how much fluid you can drink. Every day fill a pitcher with that amount of water. If you drink another fluid (such as coffee) that day, pour an equal amount of water out of the pitcher. · Count foods that are liquid at room temperature as fluids. These include ice, gelatin, ice pops, and ice cream.  Where can you learn more? Go to http://www.gray.com/  Enter [id number] in the search box to learn more about \"Diet for Chronic Kidney Disease (Before Dialysis): Care Instructions. \"  Current as of: September 8, 2021               Content Version: 13.2  © 6121-3361 [employer]. Care instructions adapted under license by [employer] (which disclaims liability or warranty for this information). If you have questions about a medical condition or this instruction, always ask your healthcare professional. [place] [address] any warranty or liability for your use of this information.

## 2022-06-28 NOTE — PROGRESS NOTES
Problem: Falls - Risk of  Goal: *Absence of Falls  Description: Document Christiano Hough Fall Risk and appropriate interventions in the flowsheet.   Outcome: Progressing Towards Goal  Note: Fall Risk Interventions:  Mobility Interventions: Patient to call before getting OOB         Medication Interventions: Teach patient to arise slowly         History of Falls Interventions: Evaluate medications/consider consulting pharmacy         Problem: Patient Education: Go to Patient Education Activity  Goal: Patient/Family Education  Outcome: Progressing Towards Goal     Problem: Patient Education: Go to Patient Education Activity  Goal: Patient/Family Education  Outcome: Progressing Towards Goal     Problem: Unstable angina/NSTEMI: Day of Admission/Day 1  Goal: Diagnostic Test/Procedures  Outcome: Progressing Towards Goal     Problem: Unstable angina/NSTEMI: Day of Admission/Day 1  Goal: Nutrition/Diet  Description: NPO for stress test.  Outcome: Progressing Towards Goal     Problem: Unstable angina/NSTEMI: Day of Admission/Day 1  Goal: Activity/Safety  Outcome: Progressing Towards Goal     Problem: Unstable angina/NSTEMI: Day of Admission/Day 1  Goal: Medications  Outcome: Progressing Towards Goal     Problem: Unstable angina/NSTEMI: Day of Admission/Day 1  Goal: Respiratory  Outcome: Progressing Towards Goal     Problem: Unstable angina/NSTEMI: Day of Admission/Day 1  Goal: Treatments/Interventions/Procedures  Outcome: Progressing Towards Goal     Problem: Unstable angina/NSTEMI: Day of Admission/Day 1  Goal: Psychosocial  Outcome: Progressing Towards Goal     Problem: Unstable angina/NSTEMI: Day of Admission/Day 1  Goal: *Hemodynamically stable  Outcome: Progressing Towards Goal     Problem: Unstable angina/NSTEMI: Day of Admission/Day 1  Goal: *Optimal pain control at patient's stated goal  Outcome: Progressing Towards Goal     Problem: Unstable angina/NSTEMI: Day of Admission/Day 1  Goal: *Lungs clear or at baseline  Outcome: Progressing Towards Goal

## 2022-06-28 NOTE — PROGRESS NOTES
PHYSICAL THERAPY EVALUATION/DISCHARGE  Patient: Davy Kehr (89 y.o. male)  Date: 6/28/2022  Primary Diagnosis: Chest pain [R07.9]  DAVIAN (acute kidney injury) ([facility] [address] [N17.9]  CKD (chronic kidney disease) stage 3, GFR 30-59 ml/min ([facility]) [N18.30]  HTN (hypertension) [I10]       Precautions:          ASSESSMENT  Based on the objective data described below, the patient presents with Generalized weakness and decreased activity tolerance but is able to safely ambulate and a/descend stairs independently. Pt was educated on activity pacing and to not over exert himself to the point where he becomes lightheaded and how to manage symptoms if they arise. Other factors to consider for discharge: Elevated BP      Further skilled acute physical therapy is not indicated at this time. PLAN :  Recommendation for discharge: (in order for the patient to meet his/her long term goals)  Cardiac Rehab 2-3 times per week. This discharge recommendation:  Has been made in collaboration with the attending provider and/or case management    IF patient discharges home will need the following DME: none       SUBJECTIVE:   Patient stated I am feeling okay today. I feel a lot better now that they have taken off all this fluid. I have been having difficulty walking because of the fluid in my legs for the past few years. I have been able to walk around without falling but its has been more difficult than before I was having my heart issues. I am able to get around and drive myself in the community.     OBJECTIVE DATA SUMMARY:   HISTORY:    Past Medical History:   Diagnosis Date    Chronic kidney disease     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     Diabetes ([facility] [address]     Hypertension     Lumbar spondylosis    No past surgical history on file. Prior level of function: Independent community ambulator.    Personal factors and/or comorbidities impacting plan of care: High /85 following session     Home Situation  Home Environment: Private residence  # Steps to Enter: 10  Rails to Enter: Yes  Hand Rails : Bilateral  One/Two Story Residence: One story  Living Alone: Yes  Support Systems: Child(amy)  Patient Expects to be Discharged to[de-identified] Home  Current DME Used/Available at Home: None    EXAMINATION/PRESENTATION/DECISION MAKING:   Critical Behavior:  Neurologic State: Alert  Orientation Level: Oriented X4        Hearing: Auditory  Auditory Impairment: None  Range Of Motion:  AROM: Within functional limits                       Strength:    Strength: Generally decreased, functional                    Functional Mobility:  Bed Mobility:  Rolling: Independent  Supine to Sit: Independent  Sit to Supine: Independent  Scooting: Independent  Transfers:  Sit to Stand: Independent  Stand to Sit: Independent                       Balance:   Sitting: Intact; Without support  Standing: Intact; Without support  Ambulation/Gait Training:  Distance (ft): 300 Feet (ft)     Ambulation - Level of Assistance: Independent     Gait Description (WDL): Exceptions to WDL  Gait Abnormalities: Path deviations              Speed/Macho: Slow                     Pt began to feel light headed during last 50 ft of ambulation and widened base of support, slowed macho, and increased path deviations. However, he was able to maintain balance and return to room with SBA. Stairs:  Number of Stairs Trained:  (16)  Stairs - Level of Assistance: Independent   Rail Use: Both    Treatment Session:  Pt performed bed mobility and transfers independently. Ambulated 150 ft to stairs a/descended 16 steps with B hand rails independently and ambulated back to room 150 ft. Last 50ft of ambulation pt became lightheaded but was able to return to room safely. Pt BP at end of session 200/85.         Physical Therapy Evaluation Charge Determination   History Examination Presentation Decision-Making   MEDIUM  Complexity : 1-2 comorbidities / personal factors will impact the outcome/ POC  LOW Complexity : 1-2 Standardized tests and measures addressing body structure, function, activity limitation and / or participation in recreation  LOW Complexity : Stable, uncomplicated  LOW      Based on the above components, the patient evaluation is determined to be of the following complexity level: LOW     Pain Ratin/10    Activity Tolerance:   Fair      After treatment patient left in no apparent distress:   Supine in bed and Call bell within reach    COMMUNICATION/EDUCATION:   The patients plan of care was discussed with: Physical therapist, Registered nurse, Physician and Case management. Fall prevention education was provided and the patient/caregiver indicated understanding.     Thank you for this referral.  Carlos Euceda, PT DPT   Time Calculation: 25 mins

## 2022-06-28 NOTE — PROGRESS NOTES
Discharge plan of care/case management plan validated with provider discharge order. Referral faxed to cardiac rehab by Dr. Sabino Anna.

## 2022-06-28 NOTE — PROGRESS NOTES
Problem: Falls - Risk of  Goal: *Absence of Falls  Description: Document Seth Valentin Fall Risk and appropriate interventions in the flowsheet.   Outcome: Progressing Towards Goal  Note: Fall Risk Interventions:  Mobility Interventions: Patient to call before getting OOB         Medication Interventions: Patient to call before getting OOB,Teach patient to arise slowly         History of Falls Interventions: Evaluate medications/consider consulting pharmacy

## 2022-06-28 NOTE — ROUTINE PROCESS
Pt. Transferred via w/c  To [facility] for transport home by friend.

## 2022-06-28 NOTE — DISCHARGE SUMMARY
Physician Discharge Summary       Patient: Harry Cisse MRN: [id number]     YOB: 1965  Age: 60 y.o. Sex: male    PCP: Anupama Ramírez MD    Allergies: Patient has no known allergies. Admit date: 6/26/2022  Admitting Provider: Nessa Concepcion MD    Discharge date: 6/28/2022  Discharging Provider: Nessa Concepcion MD    * Admission Diagnoses:   Chest pain [R07.9]  DAVIAN (acute kidney injury) ([facility] [N17.9]  CKD (chronic kidney disease) stage 3, GFR 30-59 ml/min ([facility] [N18.30]  HTN (hypertension) [I10]      * Discharge Diagnoses:    Hospital Problems as of 6/28/2022 Date Reviewed: 5/26/2022          Codes Class Noted - Resolved POA    CKD (chronic kidney disease) stage 4, GFR 15-29 ml/min ([facility]) ICD-10-CM: N18.4  ICD-9-CM: 585.4  6/27/2022 - Present Unknown        Hyperkalemia ICD-10-CM: E87.5  ICD-9-CM: 276.7  6/27/2022 - Present Unknown        Dyspnea ICD-10-CM: R06.00  ICD-9-CM: 786.09  6/27/2022 - Present Unknown        HTN (hypertension) ICD-10-CM: I10  ICD-9-CM: 401.9  6/27/2022 - Present Unknown        CKD (chronic kidney disease) stage 3, GFR 30-59 ml/min ([facility]) ICD-10-CM: N18.30  ICD-9-CM: 585.3  6/27/2022 - Present Unknown        DAVIAN (acute kidney injury) ([facility] ICD-10-CM: N17.9  ICD-9-CM: 584.9  6/27/2022 - Present Unknown        * (Principal) Chest pain ICD-10-CM: R07.9  ICD-9-CM: 786.50  6/26/2022 - Present Unknown                * Hospital Course: As per admitting provider on 6/27:  Harry Cisse is a 60 y.o. male followed by Dr. Waylon Keenan and Dr. 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 Resents from home with acute onset of shortness of breath and left-sided chest pain/tightness. .  Patient also noted increased lower extremity and scrotal edema prior to arrival and noted that he is unable to lie flat.   Patient describes chest pain occurring a few times in the past and recently discharged from [address] where patient had reaction to injections given by orthopedics. Patient describes pain as left center and radiating to the right did feel shoulder discomfort but is unable to describe symptoms. Did have positive diaphoresis with this episode. States he has been short of breath progressively increasing over the last few weeks and unable to lie flat and also noted increased swelling in his legs and scrotal area. Was noted to be given lasix 40mg IV x 1 and had good  Urine output and states his leg swelling as well as his shortness of breath have improved an close to resolving and also noted scrotal swelling has improved. Chest x-ray showed interval increase and enlargement of the cardiac silhouette BMP elevated at 2364 and BUN/creatinine at 40/4.55 with noted baseline creatinine by nephrology office at 3.9. Noted to have chronic elevated potassium at 5.2 initial troponin was 36 and on repeat was at 38 and repeat thereafter was at 55. Noted that patient has chronic uncontrolled blood pressure and mention of possible renal artery stenosis.   The patient was referred for admission to remote telemetry for chest pain, fluid overload, CKD    Chest pain/tightness  -Cardiology consult appreciated  -Initial troponins 36 -> 38 -> 55  -Improved chest tightness after IV Lasix given  -Patient evaluated by cardiology and performed nuclear stress test this morning and will follow up with cardiology on 6/29 with official results and further ischemic evaluation as needed  -Changes in normal range at 3.37 also lipid profile within normal limits  -Physical therapy evaluated the patient and recommended cardiac rehab 2-3 times per week so we will send referral to [facility] cardiac rehab department and referral was faxed and they will call patient about 6 weeks after initial admission to acute care        Hypervolemia  -Elevated BNP of 2364 but negative chest x-ray but clinically bilateral lower extremity edema scrotal edema and positive orthopnea  -Evaluate for CHF with 2D echo with prelim small to moderate pericardial effusion and IVC minimally dilated but compressible the report still pending  -Lasix 40 mg IV x1 given the emergency room and recommended continue Lasix 40 mg IV twice daily and on discharge to place on 40 mg oral twice daily  -Monitor daily weight and input output  -Strict fluid intake to 1.5 L/day     Hypertensive urgency  -Acute on chronic and needs evaluation for renal artery stenosis  -Continue Lasix 40 mg IV twice daily along with amlodipine 10 mg, Coreg 25 mg oral twice daily, hydralazine 50 mg oral 3 times daily but will increase to 100 mg oral 3 times daily on discharge will continue on 40 mg oral twice daily  -Patient recommended to have increased work of for possible renal artery stenosis and cardiology will refer patient to vascular  for further evaluation     Stage IV CKD  -Monitored chronically by    -Baseline creatinine about 3.9 and on admission was 4.55  -Monitor input output and daily weights  -Monitor daily renal panel  -Continue chronic sodium bicarbonate  -Nephrology consult appreciated  -Recommending further evaluation with co2 angiogram for renal artery angiogram but unable to perform here but is available at [facility] will need to do as outpatient  -Patient is to follow-up closely as scheduled with nephrology where at that time repeat blood work will be done and was recommended to increase Lasix from daily to twice daily and to monitor her daily weights closely     Diabetes mellitus type 2  -Monitor with regular insulin sliding scale with Accu-Cheks before meals and at bedtime  -Last A1c in May 2022 was at 7.1  -Restart home medications glipizide twice daily as well as equivalent Januvia     Iron deficiency anemia  -  ferrous sulfate 325 mg oral twice daily  -Hemoglobin stable at 8.4     Hypomagnesemia  -Slightly low at 1.7 so 2 g IV mag sulfate rider given and repeat magnesium levels at 2.0     * Procedures:   * No surgery found *        Consults:            CONSULTATION     REASON FOR CONSULT:  Chest Pain     REQUESTING PROVIDER:  Dr. Henri Marte:        Chief Complaint   Patient presents with    Fatigue    Shortness of Breath    Cough            HISTORY OF PRESENT ILLNESS:  Ana María Cuenca is a 60y.o. year-old male with past medical history significant for poorly controlled HTN (with known history of right malrotated pelvic kidney) , CKD4, DM, Lumbar Spondylosis with radiculopathy, Chronic HFpEF, Paget's Disease, and Moderate Multivalvular disease who presented to ED for evaluation of chest tightness non-radiating with associated shortness of breath, cough, peripheral edema, and scrotal edema. He also reports orthopnea and PND. Sx are worsened by lying flat and exertion. NO specific alleviating factors other than IV diuresis in ED. This am he reports that he is feeling mildly better, but still not at baseline. Workup in ED revealed Hgb 8.5, Plat 145, Creat 4.55, K 5.2, BNP 2364, HS Trop 36-38-55, CXR showing Cardiomegaly with worsening of silhoutte concerning for increase in Pericardial effusion noted on TTE 5/25/22 when admitted at [facility].       Records from hospital admission course thus far reviewed.       Telemetry Review: SR    Case discussed with collaborating physician Dr. Corwin Kurtz and our impression and recommendations are as follows:   1. Chest Pain:  ACS ruled out with HS trop trends of 36-38-55 and EKG criteria. Just admitted late May for same complaints. TTE 5/25/22 at that time showing EF 55-60% with severe wall thickening and diastolic dysfunction. OP Ischemic evaluation recommended, however patient has not followed-up. Will plan to make NPO after midnight and perform PNST in am given continued sx and risk factors.   Continue risk factor modification and BP control. 2. HTN:  BP above goal. Appreciate Nephrology input. Known to [facility] and Dr. Royer Shipman. There is concern for Renal Artery Stenosis given malrotated right pelvic kidney. Will plan for referral to [facility] Joe Glez in OP setting for arteriogram and further intervention if required. Otherwise continue medical management with meds. 3. HLD: continue current statin dosing. Consider obtaining Lipid panel if not one in past 90 days. 4. DM:  A1C 7.1 on recent admission. Continue strict glycemic control for risk factor modification. 5. Acute on Chronic HFpEF Exacerbation:  Patient appears mildly hypervolemic on exam, though he reports it is improved since admission. Note 1600cc urine output overnight. Would recommend continuing IV diuresis today if Nephrology is amenable. Continue GDMT as appropriate. Given severe wall thickness noted on TTE 5/25/22 note cardiologist at that time (Dr. Tammie Jonas) recommended Cardiac MRI in OP setting to evaluate for cardiac amyloidosis. This can further be discussed at OP follow-up once ischemic evaluation is complete. 6. Multivalvular Disease: Moderate MVR, PVR, TVR noted on TTE 5/25/22. Continue diuretics and HTN control. Appreciate nephrology assistance with diuresis. 7. Pericardial effusion: small noted on TTE 5/25. CXR upon arrival to ED notes further enlargement of cardiac silhouette. Will obtain Limited TTE to re-evaluate. Continue IV diuresis otherwise at this time, he shows no signs of decompensation.         Thank you for involving us in the care of this patient. Please do not hesitate to call if additional questions arise.  If after hours please call [phone]  IP CONSULT TO NEPHROLOGY [[id number]] ordered by Dixie Bae MD          Expand All Collapse All          []Hide copied text    []Hover for details      Consult Date: 6/27/2022     IP CONSULT TO NEPHROLOGY  Consult performed by: Aidan Scales MD  Consult ordered by: Dixie Bae MD           Subjective   HISTORY OF PRESENTING ILLNESS   Patient is a 57-year-old -American male with history of CKD stage IV, hypertension-difficult to control, diabetes mellitus, who is admitted because of worsening shortness of breath which developed over the last 2 weeks. He had accompanying leg swelling as well. He states that he ran out of some of his blood pressure medications. He has been watching his diet diligently though he emphasizes. He has been treated with IV Lasix and is reportedly doing much better. His leg swelling has gone down and he is not short of breath at res. t he states he has been urinating quite frequently as result of the Lasix. Patient follows up with me in my CKD clinic at [place]. Last serum creatinine was 3.90. he has a tendency towards hyperkalemia. He was started on minoxidil by me outpatient. Also he needs to be on intermittent Lasix. Patient Active Problem List   Diagnosis Code    Accelerated hypertension I10    Vasovagal near-syncope R55    Chest pain R07.9    CKD (chronic kidney disease) stage 4, GFR 15-29 ml/min ([facility]) N18.4    Hyperkalemia E87.5    Dyspnea R06.00         DIAGNOSES:  1. Acute kidney injury, grade 3 DAVIAN and criteria  2. CKD stage IV  3. Hypertension, urgency-difficult to control  4. Hypervolemia- could be decompensated CHF  5. Mild hyperkalemia  6. Type IV RTA  7. Diabetes mellitus type 2 without complications  8. History of Paget's disease  9. Right pelvic malrotated kidney  DISCUSSION:  · He has had history of right malrotated pelvic kidney and could have renal artery stenosis that is provoking/making him vulnerable to these attacks of flash pulmonary edema     PLAN:  · Renal artery duplex.    · Continue sodium bicarbonate  · Continue IV Lasix  · Reinstate minoxidil, if okay with cardiology- that is that there is no major CAD  · Low sodium, low potassium diet and please give him a list of low potassium foods on discharge. · Follow-up with me in 2 weeks after discharge     Addendum-it appears that renal duplex cannot be done at this time. Alternative option is to do CO2 angiograms or renal artery angiogram and do angioplasty. We will coordinate with cardiology to see if that can be arranged     Thanks for consulting me. Please don't hesitate to contact me if any questions arise of if I can assist in any manner. PHYSICAL THERAPY EVALUATION/DISCHARGE  Patient: Elan Macdonald (19 y.o. male)  Date: 6/28/2022  Primary Diagnosis: Chest pain [R07.9]  DAVIAN (acute kidney injury) ([facility] [N17.9]  CKD (chronic kidney disease) stage 3, GFR 30-59 ml/min ([facility] [N18.30]  HTN (hypertension) [I10]       Precautions:            ASSESSMENT  Based on the objective data described below, the patient presents with Generalized weakness and decreased activity tolerance but is able to safely ambulate and a/descend stairs independently.  Pt was educated on activity pacing and to not over exert himself to the point where he becomes lightheaded and how to manage symptoms if they arise.     Other factors to consider for discharge: Elevated BP      Further skilled acute physical therapy is not indicated at this time.      PLAN :  Recommendation for discharge: (in order for the patient to meet his/her long term goals)  Cardiac Rehab 2-3 times per week.     This discharge recommendation:  Has been made in collaboration with the attending provider and/or case management     IF patient discharges home will need the following DME: none       Vitals Last 24 Hours:  Patient Vitals for the past 24 hrs:   Temp Pulse Resp BP SpO2   06/28/22 1128 97.3 °F (36.3 °C) 80 20 (!) 192/78 93 %   06/28/22 0956 -- 76 -- (!) 198/81 --   06/28/22 0704 97.8 °F (36.6 °C) 67 20 (!) 160/61 94 %   06/28/22 0347 97.3 °F (36.3 °C) 75 18 (!) 152/71 94 %   06/27/22 2332 97.3 °F (36.3 °C) 75 17 (!) 156/77 97 %   06/27/22 2029 97.1 °F (36.2 °C) 77 18 (!) 192/94 98 %   06/27/22 2000 -- 80 -- -- --   06/27/22 1548 (!) 96.4 °F (35.8 °C) 75 20 (!) 186/73 95 %        Discharge Exam:  General: Alert, cooperative, no distress, appears stated age. Head:  Normocephalic, without obvious abnormality, atraumatic. Eyes:  Conjunctivae/corneas clear. Pupils equal, round, reactive to light. Extraocular movements intact. Lungs:  Clear to auscultation bilaterally. no wheeze, rales, crackles, rhonchi   Chest wall: No tenderness or deformity. Heart:  Regular rate and rhythm, S1, S2 normal, no murmur, click, rub or gallop. Abdomen:  Soft, non-tender. Bowel sounds normal. No masses,  No organomegaly. Extremities: Extremities normal, atraumatic, no cyanosis or edema. Pulses: 2+ and symmetric all extremities. Skin: Skin color, texture, turgor normal. No rashes or lesions  Neurologic: Awake, Alert, oriented.  No obvious gross sensory or motor deficits    Labs:  Recent Results (from the past 24 hour(s))   GLUCOSE, POC    Collection Time: 06/27/22  3:47 PM   Result Value Ref Range    Glucose (POC) 130 (H) 65 - 117 mg/dL    Performed by Frida Ribeiro    GLUCOSE, POC    Collection Time: 06/27/22  9:20 PM   Result Value Ref Range    Glucose (POC) 162 (H) 65 - 117 mg/dL    Performed by Warren Carmona    MAGNESIUM    Collection Time: 06/28/22  5:30 AM   Result Value Ref Range    Magnesium 2.0 1.6 - 2.4 mg/dL   RENAL FUNCTION PANEL    Collection Time: 06/28/22  5:30 AM   Result Value Ref Range    Sodium 146 (H) 136 - 145 mmol/L    Potassium 5.2 (H) 3.5 - 5.1 mmol/L    Chloride 113 (H) 97 - 108 mmol/L    CO2 25 21 - 32 mmol/L    Anion gap 8 5 - 15 mmol/L    Glucose 138 (H) 65 - 100 mg/dL    BUN 45 (H) 6 - 20 mg/dL    Creatinine 4.66 (H) 0.70 - 1.30 mg/dL    BUN/Creatinine ratio 10 (L) 12 - 20      GFR est AA 16 (L) >60 ml/min/1.73m2    GFR est non-AA 13 (L) >60 ml/min/1.73m2    Calcium 8.3 (L) 8.5 - 10.1 mg/dL    Phosphorus 4.1 2.6 - 4.7 mg/dL    Albumin 3.1 (L) 3.5 - 5.0 g/dL   CBC WITH AUTOMATED DIFF    Collection Time: 06/28/22  5:30 AM   Result Value Ref Range    WBC 4.6 4.4 - 11.3 K/uL    RBC 3.00 (L) 4.50 - 5.90 M/uL    HGB 8.4 (L) 13.5 - 17.5 g/dL    HCT 25.8 (L) 41 - 53 %    MCV 86.1 80 - 100 FL    MCH 27.9 (L) 31 - 34 PG    MCHC 32.5 31.0 - 36.0 g/dL    RDW 14.0 11.5 - 14.5 %    PLATELET 207 (L) 886 - 400 K/uL    MPV 8.2 6.5 - 11.5 FL    NRBC 0.1  WBC    ABSOLUTE NRBC 0.00 K/uL    NEUTROPHILS 57 42 - 75 %    LYMPHOCYTES 25 20.5 - 51.1 %    MONOCYTES 14 (H) 1.7 - 9.3 %    EOSINOPHILS 3 (H) 0.9 - 2.9 %    BASOPHILS 1 0.0 - 2.5 %    ABS. NEUTROPHILS 2.6 1.8 - 7.7 K/UL    ABS. LYMPHOCYTES 1.2 1.0 - 4.8 K/UL    ABS. MONOCYTES 0.7 0.2 - 2.4 K/UL    ABS. EOSINOPHILS 0.2 0.0 - 0.7 K/UL    ABS. BASOPHILS 0.0 0.0 - 0.2 K/UL   GLUCOSE, POC    Collection Time: 06/28/22  7:02 AM   Result Value Ref Range    Glucose (POC) 156 (H) 65 - 117 mg/dL    Performed by [id number] STRESS TEST    Collection Time: 06/28/22 10:15 AM   Result Value Ref Range    Stress Target  bpm    Baseline HR 75 bpm    Baseline Systolic  mmHg    Baseline Diastolic BP 79 mmHg    Stress Peak HR 78 bpm    Stress Systolic  mmHg    Stress Diastolic BP 79 mmHg    Stress Rate Pressure Product 16,224 bpm*mmHg    Stress Percent HR Achieved 48 %    Nuc Stress EF 55 %   GLUCOSE, POC    Collection Time: 06/28/22 11:26 AM   Result Value Ref Range    Glucose (POC) 238 (H) 65 - 117 mg/dL    Performed by Clara Ojeda          Imaging:  XR CHEST PORT    Result Date: 6/26/2022  Interval increase in enlargement of the cardiac silhouette, which may be related to cardiomegaly or a pericardial effusion. Further evaluation as clinically indicated. * Discharge Condition: improved  * Disposition: Home w/Family      Discharge Medications:  Current Discharge Medication List      START taking these medications    Details   furosemide (Lasix) 40 mg tablet Take 1 Tablet by mouth Before breakfast and dinner.   Qty: 60 Tablet, Refills: 0  Start date: 6/28/2022         CONTINUE these medications which have CHANGED    Details   hydrALAZINE (APRESOLINE) 100 mg tablet Take 1 Tablet by mouth three (3) times daily. Qty: 90 Tablet, Refills: 0  Start date: 6/28/2022         CONTINUE these medications which have NOT CHANGED    Details   carvediloL (COREG) 25 mg tablet Take 1 Tablet by mouth two (2) times daily (with meals). Qty: 60 Tablet, Refills: 0      amLODIPine (NORVASC) 10 mg tablet Take 10 mg by mouth daily. atorvastatin (LIPITOR) 40 mg tablet Take 40 mg by mouth daily. ferrous sulfate 325 mg (65 mg iron) tablet Take 325 mg by mouth two (2) times daily (with meals). isosorbide mononitrate (ISMO, MONOKET) 20 mg tablet Take 20 mg by mouth two (2) times a day. SITagliptin (JANUVIA) 50 mg tablet Take 50 mg by mouth daily. sodium bicarbonate 650 mg tablet Take 1,300 mg by mouth three (3) times daily. glipiZIDE (GLUCOTROL) 5 mg tablet Take 5 mg by mouth two (2) times a day. * Follow-up Care/Patient Instructions: Activity: Activity as tolerated  Diet: Cardiac Diet and headache with 1.5 L fluid restriction  Monitor vital signs daily  Monitor daily weights and if greater than 3 pound weight gain in 24 hours again 5 pound weight gain in 5 days to take extra dose of 40 mg of Lasix at noon      Follow-up Information     Follow up With Specialties Details Why Jolly Gomez MD Nephrology On 7/5/2022 @ [facility]      Domenic Toure MD  On 6/29/2022 @ 1:30 PM Cardiologist  [phone]. [address], [address] [place] [address]    Clare Perdomo MD Family Medicine On 7/1/2022 @ 9 AM [address] [place] [address]  [phone]          Spent 35  minutes evaluting and coordinating patient care discharge home close follow-up with both cardiology and nephrology of which >50% was spent coordinating and counseling.      Signed:  Jaek Mccall MD  6/28/2022  1:06 PM

## 2022-06-28 NOTE — PROGRESS NOTES
Reason for Admission: Chest pain                       RUR Score:  16- moderate risk                PCP: First and Last name:   Yenny Stevens MD     Name of Practice: [address] [place] [address]   Are you a current patient: Yes/No: yes   Approximate date of last visit: 2 weeks ago   Can you participate in a virtual visit if needed: Possibly    Do you (patient/family) have any concerns for transition/discharge? No                Plan for utilizing home health:   No, patient is not homebound. Current Advanced Directive/Advance Care Plan:  Full Code      Healthcare Decision Maker:   Click here to complete HealthCare Decision Makers including selection of the Healthcare Decision Maker Relationship (ie \"Primary\")            Primary Decision Maker: Meg Alvarez - Angella - [phone]    Transition of Care Plan:  Patient is still able to drive and is independent with his own care. Patient will be discharged home with follow up appointments with his PCP, cardiology, and nephrology. Provider also faxed a referral to cardiac rehab.

## 2022-06-28 NOTE — PROGRESS NOTES
Progress Note    Patient: Liudmila Minaya MRN: [id number]  SSN: [id number]    YOB: 1965  Age: 60 y.o. Sex: male      Admit Date: 6/26/2022    LOS: 1 day     Subjective:     Patient seen and examined in stress lab prior to NST. Liudmila Minaya is a 60y.o. year-old male with past medical history significant for poorly controlled HTN (with known history of right malrotated pelvic kidney) , CKD4, DM, Lumbar Spondylosis with radiculopathy, Chronic HFpEF, Paget's Disease, and Moderate Multivalvular disease who is followed for Resistant hypertension and chest pain. He denies chest pain this am.  He has no new complaints. Telemetry Review: SR    Review of Symptoms:   Review of Systems   All other systems reviewed and are negative. Objective:     Vitals:    06/28/22 0520 06/28/22 0704 06/28/22 0956 06/28/22 1128   BP:  (!) 160/61 (!) 198/81 (!) 192/78   Pulse:  67 76 80   Resp:  20  20   Temp:  97.8 °F (36.6 °C)  97.3 °F (36.3 °C)   SpO2:  94%  93%   Weight: 75.6 kg (166 lb 10.7 oz)      Height:            Intake and Output:  Current Shift: No intake/output data recorded. Last three shifts: 06/26 1901 - 06/28 0700  In: 1390 [P.O.:1310; I.V.:70]  Out: 3900 [Urine:3900]    Physical Exam:   General: Well nourished male lying in bed, NAD, A&O  HEENT: Normocephalic, PERRL, no drainage  Neck: Supple, Trachea midline, No JVD  RESP: CTA bilaterally. + Symmetrical chest movement. No SOB or distress. On RA  Cardiovascular: RRR no MRG  PVS: No rubor, cyanosis, trace pedal edema, Radial, DP, PT pulses equal bilaterally  ABD: flat, soft, NT, Normoactive BS  Derm: Warm/Dry/Intact with no lesions, normal turgor  Neuro: A&O PPTS, cranial nerves II- XII grossly intact via interaction with patient.  No focal deficits  PSYCH: No anxiety or agitation      Lab/Data Review:  BMP:   Lab Results   Component Value Date/Time     (H) 06/28/2022 05:30 AM    K 5.2 (H) 06/28/2022 05:30 AM     (H) 06/28/2022 05:30 AM    CO2 25 06/28/2022 05:30 AM    AGAP 8 06/28/2022 05:30 AM     (H) 06/28/2022 05:30 AM    BUN 45 (H) 06/28/2022 05:30 AM    CREA 4.66 (H) 06/28/2022 05:30 AM    GFRAA 16 (L) 06/28/2022 05:30 AM    GFRNA 13 (L) 06/28/2022 05:30 AM            Current Facility-Administered Medications:     glucose chewable tablet 16 g, 4 Tablet, Oral, PRN, Mohinder Vines MD    dextrose (D50W) injection syrg 12.5-25 g, 25-50 mL, IntraVENous, PRN, Mohinder Vines MD    glucagon (GLUCAGEN) injection 1 mg, 1 mg, IntraMUSCular, PRN, Mohinder Vines MD    insulin lispro (HUMALOG) injection, , SubCUTAneous, AC&HS, Mohinder Vines MD    carvediloL (COREG) tablet 25 mg, 25 mg, Oral, BID WITH MEALS, Mohinder Vines MD, 25 mg at 06/28/22 1003    ferrous sulfate tablet 325 mg, 325 mg, Oral, BID WITH MEALS, Mohinder Vines MD, 325 mg at 06/28/22 1003    [Held by provider] isosorbide mononitrate (ISMO, MONOKET) tablet 20 mg, 20 mg, Oral, BID, oMhinder Vines MD    alogliptin (NESINA) tablet 6.25 mg, 6.25 mg, Oral, DAILY, Mohinder Vines MD, 6.25 mg at 06/28/22 1004    sodium bicarbonate tablet 1,300 mg, 1,300 mg, Oral, TID, Mohinder Vines MD, 1,300 mg at 06/28/22 1004    atorvastatin (LIPITOR) tablet 40 mg, 40 mg, Oral, DAILY, Mohinder Vines MD, 40 mg at 06/28/22 1004    amLODIPine (NORVASC) tablet 10 mg, 10 mg, Oral, DAILY, Mohinder Vines MD, 10 mg at 06/28/22 1004    glipiZIDE (GLUCOTROL) tablet 5 mg, 5 mg, Oral, BID WITH MEALS, Mohinder Vines MD, 5 mg at 06/28/22 1004    furosemide (LASIX) injection 40 mg, 40 mg, IntraVENous, ACB&D, Mohinder Vines MD, 40 mg at 06/28/22 1004    hydrALAZINE (APRESOLINE) tablet 100 mg, 100 mg, Oral, TID, Mohinder Vines MD, 100 mg at 06/28/22 1004    sodium chloride (NS) flush 5-40 mL, 5-40 mL, IntraVENous, Q8H, Sarkis Price MD, 10 mL at 06/28/22 0526    sodium chloride (NS) flush 5-40 mL, 5-40 mL, IntraVENous, PRN, Blanca Rhoades MD    acetaminophen (TYLENOL) tablet 650 mg, 650 mg, Oral, Q6H PRN **OR** acetaminophen (TYLENOL) suppository 650 mg, 650 mg, Rectal, Q6H PRN, Blanca Rhoades MD    polyethylene glycol (MIRALAX) packet 17 g, 17 g, Oral, DAILY PRN, Blanca Rhoades MD    ondansetron (ZOFRAN ODT) tablet 4 mg, 4 mg, Oral, Q8H PRN **OR** ondansetron (ZOFRAN) injection 4 mg, 4 mg, IntraVENous, Q6H PRN, Blanca Rhoades MD    enoxaparin (LOVENOX) injection 30 mg, 30 mg, SubCUTAneous, DAILY, Blanca Rhoades MD, 30 mg at 06/28/22 1004      Assessment:     Principal Problem:    Chest pain (6/26/2022)    Active Problems:    CKD (chronic kidney disease) stage 4, GFR 15-29 ml/min ([facility]) (6/27/2022)      Hyperkalemia (6/27/2022)      Dyspnea (6/27/2022)      HTN (hypertension) (6/27/2022)      CKD (chronic kidney disease) stage 3, GFR 30-59 ml/min ([facility] (6/27/2022)      DAVIAN (acute kidney injury) ([facility] (6/27/2022)        Plan:     Case discussed with Collaborating physician Dr. Everlena Litten and our recommendations are as follows:   1. Chest Pain:  ACS ruled out with HS trop trends of 36-38-55 and EKG criteria. Just admitted late May for same complaints. TTE 5/25/22 at that time showing EF 55-60% with severe wall thickening and diastolic dysfunction. OP Ischemic evaluation recommended, however patient has not followed-up. PNST completed this am.  He can follow-up in clinic tomorrow for results. Continue risk factor modification and BP control. 2. HTN:  BP above goal. Appreciate Nephrology input. Known to SKS and Dr. Bisi Nguyen. There is concern for Renal Artery Stenosis given malrotated right pelvic kidney. Will plan for referral to C-Dr. Duc Trent in OP setting for CO2 arteriogram and further intervention if required. Otherwise continue medical management with meds.  Will evaluate re-initiation of minoxidil tomorrow at follow-up pending NST results per Nephrology request.  Consider addition of Isosorbide Dinitrate in addition to Hydralazine given known benefits. 3. HLD: continue current statin dosing. Consider obtaining Lipid panel if not one in past 90 days. 4. DM:  A1C 7.1 on recent admission. Continue strict glycemic control for risk factor modification. 5. Acute on Chronic HFpEF Exacerbation:  Patient appears mildly hypervolemic on exam, though he reports it is improved since admission. Note 1600cc urine output overnight @ admission. Continue GDMT as appropriate with assistance from Nephrology. Please note patient has tendency for hyperkalemia and has been consistently hyperkalemic during stay this time. Given severe wall thickness noted on TTE 5/25/22 note cardiologist at that time (Dr. Vernon Watt) recommended Cardiac MRI in OP setting to evaluate for cardiac amyloidosis. This can further be discussed at OP follow-up once ischemic evaluation is complete. 6. Multivalvular Disease: Moderate MVR, PVR, TVR noted on TTE 5/25/22. Continue diuretics and HTN control. Appreciate nephrology assistance with diuresis. 7. Pericardial effusion: small noted on TTE 5/25. CXR upon arrival to ED this admission notes further enlargement of cardiac silhouette. Limited TTE 6/27 to re-evaluate showed small to moderate with no hemodynamic compromise. Continue diuresis. He remains stable. 8. CKD4: Followed by Dr. Leonila Martin. Appreciate his assistance with management. Plan as above. Thank you for involving us in the care of this patient. Please do not hesitate to call if additional questions arise.  If after hours please call [phone]    Signed By: Mariah Mcmullen NP     June 28, 2022

## 2022-06-28 NOTE — ROUTINE PROCESS
Bedside shift change report given to ming tran (oncoming nurse) by CYBRA nurse). Report included the following information Kardex.

## 2022-06-28 NOTE — PROGRESS NOTES
Patients case reviewed during interdisciplinary team meeting in [address] [place], [place] [address]Acute Care Unit. Rev.  Hoang Celestin [address], [employer]

## 2022-07-12 ENCOUNTER — APPOINTMENT (OUTPATIENT)
Dept: CARDIAC REHAB | Age: 57
End: 2022-07-12

## 2022-07-12 ENCOUNTER — TELEPHONE (OUTPATIENT)
Dept: INTERNAL MEDICINE | Age: 57
End: 2022-07-12

## 2022-07-26 ENCOUNTER — HOSPITAL ENCOUNTER (OUTPATIENT)
Dept: CARDIAC REHAB | Age: 57
Discharge: HOME | End: 2022-07-26
Payer: MEDICAID

## 2022-07-26 VITALS — HEIGHT: 73 IN | BODY MASS INDEX: 20.89 KG/M2 | WEIGHT: 157.6 LBS

## 2022-07-26 PROCEDURE — 93797 PHYS/QHP OP CAR RHAB WO ECG: CPT

## 2022-07-26 PROCEDURE — 93798 PHYS/QHP OP CAR RHAB W/ECG: CPT

## 2022-07-26 RX ORDER — MINOXIDIL 10 MG/1
5 TABLET ORAL 2 TIMES DAILY
COMMUNITY
End: 2022-08-12

## 2022-07-26 NOTE — CARDIO/PULMONARY
Seb Carr   60 y.o.    presented to cardiac rehab for orientation and exercise tolerance test today with a primary diagnosis of angina  . Seb Carr has a history of  HTN, DM, CKD, iron def anemia, lumbar spondylosis and radiculopathy. Cardiac risk factors include diabetes mellitus, HTN, hyperlipidemia. Seb Carr is not  and lives alone. social hx. PHQ9, depression score, is 2 and this is considered normal. The result was discussed with patient who affirms score to be accurate. Patient denied chest pain or SOB during 6 minute walk test and was in NSR-ST wiithout ectopy. Exercise prescription developed around patient stated goals, to be supplemented with home exercise recommendations. EF is 55-60%. Education manual given to patient and reviewed.  Patient will attend cardiac rehab 2-3 times/week and education

## 2022-07-27 ENCOUNTER — HOSPITAL ENCOUNTER (OUTPATIENT)
Dept: CARDIAC REHAB | Age: 57
Discharge: HOME | End: 2022-07-27
Payer: MEDICAID

## 2022-07-27 VITALS — BODY MASS INDEX: 21.19 KG/M2 | WEIGHT: 160.6 LBS

## 2022-07-27 PROCEDURE — 93798 PHYS/QHP OP CAR RHAB W/ECG: CPT

## 2022-07-27 PROCEDURE — 93797 PHYS/QHP OP CAR RHAB WO ECG: CPT

## 2022-07-28 ENCOUNTER — HOSPITAL ENCOUNTER (OUTPATIENT)
Dept: CARDIAC REHAB | Age: 57
Discharge: HOME | End: 2022-07-28
Payer: MEDICAID

## 2022-07-28 VITALS — BODY MASS INDEX: 21.14 KG/M2 | WEIGHT: 160.2 LBS

## 2022-07-28 PROCEDURE — 93798 PHYS/QHP OP CAR RHAB W/ECG: CPT

## 2022-08-01 ENCOUNTER — HOSPITAL ENCOUNTER (OUTPATIENT)
Dept: CARDIAC REHAB | Age: 57
Discharge: HOME | End: 2022-08-01
Payer: MEDICAID

## 2022-08-01 VITALS — WEIGHT: 164.4 LBS | BODY MASS INDEX: 21.69 KG/M2

## 2022-08-01 PROCEDURE — 93798 PHYS/QHP OP CAR RHAB W/ECG: CPT

## 2022-08-03 ENCOUNTER — HOSPITAL ENCOUNTER (OUTPATIENT)
Dept: CARDIAC REHAB | Age: 57
Discharge: HOME | End: 2022-08-03
Payer: MEDICAID

## 2022-08-03 VITALS — BODY MASS INDEX: 21.69 KG/M2 | WEIGHT: 164.4 LBS

## 2022-08-03 PROCEDURE — 93797 PHYS/QHP OP CAR RHAB WO ECG: CPT

## 2022-08-03 PROCEDURE — 93798 PHYS/QHP OP CAR RHAB W/ECG: CPT

## 2022-08-04 ENCOUNTER — APPOINTMENT (OUTPATIENT)
Dept: GENERAL RADIOLOGY | Age: 57
End: 2022-08-04
Attending: EMERGENCY MEDICINE
Payer: MEDICAID

## 2022-08-04 ENCOUNTER — HOSPITAL ENCOUNTER (OUTPATIENT)
Dept: CARDIAC REHAB | Age: 57
Discharge: HOME | End: 2022-08-04
Payer: MEDICAID

## 2022-08-04 ENCOUNTER — HOSPITAL ENCOUNTER (OUTPATIENT)
Age: 57
Setting detail: OBSERVATION
Discharge: HOME | End: 2022-08-04
Attending: EMERGENCY MEDICINE | Admitting: INTERNAL MEDICINE
Payer: MEDICAID

## 2022-08-04 VITALS
HEART RATE: 72 BPM | BODY MASS INDEX: 21.74 KG/M2 | DIASTOLIC BLOOD PRESSURE: 80 MMHG | RESPIRATION RATE: 16 BRPM | WEIGHT: 164 LBS | HEIGHT: 73 IN | OXYGEN SATURATION: 100 % | SYSTOLIC BLOOD PRESSURE: 162 MMHG

## 2022-08-04 DIAGNOSIS — R07.9 CHEST PAIN WITH HIGH RISK FOR CARDIAC ETIOLOGY: Primary | ICD-10-CM

## 2022-08-04 DIAGNOSIS — I50.9 CONGESTIVE HEART FAILURE, UNSPECIFIED HF CHRONICITY, UNSPECIFIED HEART FAILURE TYPE (HCC): ICD-10-CM

## 2022-08-04 LAB
ALBUMIN SERPL-MCNC: 3.5 G/DL (ref 3.5–5)
ALBUMIN/GLOB SERPL: 0.9 {RATIO} (ref 1.1–2.2)
ALP SERPL-CCNC: 97 U/L (ref 45–117)
ALT SERPL-CCNC: 34 U/L (ref 12–78)
AMPHET UR QL SCN: NEGATIVE
ANION GAP SERPL CALC-SCNC: 10 MMOL/L (ref 5–15)
AST SERPL W P-5'-P-CCNC: 23 U/L (ref 15–37)
BARBITURATES UR QL SCN: NEGATIVE
BASOPHILS # BLD: 0.1 K/UL (ref 0–0.2)
BASOPHILS NFR BLD: 1 % (ref 0–2.5)
BENZODIAZ UR QL: NEGATIVE
BILIRUB SERPL-MCNC: 0.2 MG/DL (ref 0.2–1)
BNP SERPL-MCNC: 3306 PG/ML
BUN SERPL-MCNC: 70 MG/DL (ref 6–20)
BUN/CREAT SERPL: 14 (ref 12–20)
CA-I BLD-MCNC: 8.2 MG/DL (ref 8.5–10.1)
CANNABINOIDS UR QL SCN: NEGATIVE
CHLORIDE SERPL-SCNC: 104 MMOL/L (ref 97–108)
CO2 SERPL-SCNC: 23 MMOL/L (ref 21–32)
COCAINE UR QL SCN: NEGATIVE
CREAT SERPL-MCNC: 4.9 MG/DL (ref 0.7–1.3)
DRUG SCRN COMMENT,DRGCM: NORMAL
ECSTASY, ECST: NEGATIVE
EOSINOPHIL # BLD: 0.2 K/UL (ref 0–0.7)
EOSINOPHIL NFR BLD: 4 % (ref 0.9–2.9)
ERYTHROCYTE [DISTWIDTH] IN BLOOD BY AUTOMATED COUNT: 14.4 % (ref 11.5–14.5)
GLOBULIN SER CALC-MCNC: 4.1 G/DL (ref 2–4)
GLUCOSE SERPL-MCNC: 243 MG/DL (ref 65–100)
HCT VFR BLD AUTO: 24.7 % (ref 41–53)
HGB BLD-MCNC: 8.2 G/DL (ref 13.5–17.5)
LYMPHOCYTES # BLD: 1.1 K/UL (ref 1–4.8)
LYMPHOCYTES NFR BLD: 19 % (ref 20.5–51.1)
MCH RBC QN AUTO: 28.1 PG (ref 31–34)
MCHC RBC AUTO-ENTMCNC: 33.1 G/DL (ref 31–36)
MCV RBC AUTO: 84.9 FL (ref 80–100)
METHADONE UR QL: NEGATIVE
MONOCYTES # BLD: 0.7 K/UL (ref 0.2–2.4)
MONOCYTES NFR BLD: 13 % (ref 1.7–9.3)
NEUTS SEG # BLD: 3.6 K/UL (ref 1.8–7.7)
NEUTS SEG NFR BLD: 63 % (ref 42–75)
NRBC # BLD: 0.01 K/UL
NRBC BLD-RTO: 0.2 PER 100 WBC
OPIATES UR QL: NEGATIVE
PCP UR QL: NEGATIVE
PLATELET # BLD AUTO: 155 K/UL (ref 150–400)
PMV BLD AUTO: 7.8 FL (ref 6.5–11.5)
POTASSIUM SERPL-SCNC: 4.7 MMOL/L (ref 3.5–5.1)
PROT SERPL-MCNC: 7.6 G/DL (ref 6.4–8.2)
RBC # BLD AUTO: 2.91 M/UL (ref 4.5–5.9)
SODIUM SERPL-SCNC: 137 MMOL/L (ref 136–145)
TROPONIN-HIGH SENSITIVITY: 44 NG/L (ref 0–76)
TROPONIN-HIGH SENSITIVITY: 49 NG/L (ref 0–76)
WBC # BLD AUTO: 5.7 K/UL (ref 4.4–11.3)

## 2022-08-04 PROCEDURE — 36415 COLL VENOUS BLD VENIPUNCTURE: CPT

## 2022-08-04 PROCEDURE — 80307 DRUG TEST PRSMV CHEM ANLYZR: CPT

## 2022-08-04 PROCEDURE — 85025 COMPLETE CBC W/AUTO DIFF WBC: CPT

## 2022-08-04 PROCEDURE — 71045 X-RAY EXAM CHEST 1 VIEW: CPT

## 2022-08-04 PROCEDURE — 96374 THER/PROPH/DIAG INJ IV PUSH: CPT

## 2022-08-04 PROCEDURE — G0378 HOSPITAL OBSERVATION PER HR: HCPCS

## 2022-08-04 PROCEDURE — 84484 ASSAY OF TROPONIN QUANT: CPT

## 2022-08-04 PROCEDURE — 83880 ASSAY OF NATRIURETIC PEPTIDE: CPT

## 2022-08-04 PROCEDURE — 80053 COMPREHEN METABOLIC PANEL: CPT

## 2022-08-04 PROCEDURE — 93005 ELECTROCARDIOGRAM TRACING: CPT

## 2022-08-04 PROCEDURE — 74011250636 HC RX REV CODE- 250/636: Performed by: EMERGENCY MEDICINE

## 2022-08-04 RX ORDER — SODIUM CHLORIDE 0.9 % (FLUSH) 0.9 %
5-40 SYRINGE (ML) INJECTION AS NEEDED
Status: DISCONTINUED | OUTPATIENT
Start: 2022-08-04 | End: 2022-08-04 | Stop reason: HOSPADM

## 2022-08-04 RX ORDER — POLYETHYLENE GLYCOL 3350 17 G/17G
17 POWDER, FOR SOLUTION ORAL DAILY PRN
Status: DISCONTINUED | OUTPATIENT
Start: 2022-08-04 | End: 2022-08-04 | Stop reason: HOSPADM

## 2022-08-04 RX ORDER — FUROSEMIDE 40 MG/1
80 TABLET ORAL
Qty: 8 TABLET | Refills: 0 | Status: SHIPPED | OUTPATIENT
Start: 2022-08-04 | End: 2022-08-06

## 2022-08-04 RX ORDER — SODIUM CHLORIDE 0.9 % (FLUSH) 0.9 %
5-40 SYRINGE (ML) INJECTION EVERY 8 HOURS
Status: DISCONTINUED | OUTPATIENT
Start: 2022-08-04 | End: 2022-08-04 | Stop reason: HOSPADM

## 2022-08-04 RX ORDER — ACETAMINOPHEN 325 MG/1
650 TABLET ORAL
Status: DISCONTINUED | OUTPATIENT
Start: 2022-08-04 | End: 2022-08-04 | Stop reason: HOSPADM

## 2022-08-04 RX ORDER — ONDANSETRON 2 MG/ML
4 INJECTION INTRAMUSCULAR; INTRAVENOUS
Status: DISCONTINUED | OUTPATIENT
Start: 2022-08-04 | End: 2022-08-04 | Stop reason: HOSPADM

## 2022-08-04 RX ORDER — ACETAMINOPHEN 650 MG/1
650 SUPPOSITORY RECTAL
Status: DISCONTINUED | OUTPATIENT
Start: 2022-08-04 | End: 2022-08-04 | Stop reason: HOSPADM

## 2022-08-04 RX ORDER — ENOXAPARIN SODIUM 100 MG/ML
30 INJECTION SUBCUTANEOUS DAILY
Status: DISCONTINUED | OUTPATIENT
Start: 2022-08-05 | End: 2022-08-04 | Stop reason: HOSPADM

## 2022-08-04 RX ORDER — FUROSEMIDE 10 MG/ML
40 INJECTION INTRAMUSCULAR; INTRAVENOUS
Status: COMPLETED | OUTPATIENT
Start: 2022-08-04 | End: 2022-08-04

## 2022-08-04 RX ORDER — ONDANSETRON 4 MG/1
4 TABLET, ORALLY DISINTEGRATING ORAL
Status: DISCONTINUED | OUTPATIENT
Start: 2022-08-04 | End: 2022-08-04 | Stop reason: HOSPADM

## 2022-08-04 RX ADMIN — FUROSEMIDE 40 MG: 10 INJECTION, SOLUTION INTRAMUSCULAR; INTRAVENOUS at 17:00

## 2022-08-04 NOTE — Clinical Note
Patient Class[de-identified] OBSERVATION [104]   Type of Bed: Telemetry [19]   Cardiac Monitoring Required?: Yes   Reason for Observation: high risk chest pain, chf   Admitting Diagnosis: Chest pain at rest [[id number]]   Admitting Diagnosis: CHF (congestive heart failure) [facility]) [[id number]]   Admitting Physician: Nasim Benitez [[id number]]   Attending Physician: Nasim Benitez [[id number]] Detail Level: Detailed

## 2022-08-05 LAB
ATRIAL RATE: 75 BPM
CALCULATED P AXIS, ECG09: 41 DEGREES
CALCULATED R AXIS, ECG10: 30 DEGREES
CALCULATED T AXIS, ECG11: 26 DEGREES
DIAGNOSIS, 93000: NORMAL
P-R INTERVAL, ECG05: 221 MS
Q-T INTERVAL, ECG07: 420 MS
QRS DURATION, ECG06: 112 MS
QTC CALCULATION (BEZET), ECG08: 470 MS
VENTRICULAR RATE, ECG03: 75 BPM

## 2022-08-05 NOTE — ED NOTES
Patient discharged to home. Pt verbalized understanding of d/c instructions. Pt accompanied by daughter. Pt in no acute distress at time of discharge. Electronic prescription sent to pt's pharmacy.

## 2022-08-08 ENCOUNTER — APPOINTMENT (OUTPATIENT)
Dept: CARDIAC REHAB | Age: 57
End: 2022-08-08
Payer: MEDICAID

## 2022-08-08 NOTE — CARDIO/PULMONARY
Pt. contacted regarding schedule for next phase 2 cardiac rehab visit based on recent ED visit (8/4/22) where pt. acknowledged via phone that excess fluid was removed during hospital visit. Igor Hannah-Clinical Exercise Physiologist explained to pt. that based on cardiac rehab guidelines, pt. would need signed medical release from his MD as well as 6 weeks on hold from program due to CHF exacerbation. Pt. was notified that rehab staff will contact him as soon as possible to discuss options, but that rehab would need to be put on hold today, 8/8/22 due to safety concerns and established cardiac rehab guidelines.

## 2022-08-09 ENCOUNTER — HOSPITAL ENCOUNTER (OUTPATIENT)
Dept: PREADMISSION TESTING | Age: 57
Discharge: HOME | End: 2022-08-09
Payer: MEDICAID

## 2022-08-09 VITALS
WEIGHT: 168.8 LBS | SYSTOLIC BLOOD PRESSURE: 168 MMHG | HEIGHT: 73 IN | BODY MASS INDEX: 22.37 KG/M2 | DIASTOLIC BLOOD PRESSURE: 76 MMHG | RESPIRATION RATE: 18 BRPM | OXYGEN SATURATION: 98 % | TEMPERATURE: 98.4 F | HEART RATE: 78 BPM

## 2022-08-09 LAB
ALBUMIN SERPL-MCNC: 3.7 G/DL (ref 3.5–5)
ALBUMIN/GLOB SERPL: 0.9 {RATIO} (ref 1.1–2.2)
ALP SERPL-CCNC: 99 U/L (ref 45–117)
ALT SERPL-CCNC: 39 U/L (ref 12–78)
ANION GAP SERPL CALC-SCNC: 6 MMOL/L (ref 5–15)
APTT PPP: 34.8 SEC (ref 21.2–34.1)
AST SERPL W P-5'-P-CCNC: 24 U/L (ref 15–37)
BILIRUB SERPL-MCNC: 0.3 MG/DL (ref 0.2–1)
BUN SERPL-MCNC: 68 MG/DL (ref 6–20)
BUN/CREAT SERPL: 13 (ref 12–20)
CA-I BLD-MCNC: 8.7 MG/DL (ref 8.5–10.1)
CHLORIDE SERPL-SCNC: 107 MMOL/L (ref 97–108)
CO2 SERPL-SCNC: 27 MMOL/L (ref 21–32)
CREAT SERPL-MCNC: 5.21 MG/DL (ref 0.7–1.3)
ERYTHROCYTE [DISTWIDTH] IN BLOOD BY AUTOMATED COUNT: 13.4 % (ref 11.5–14.5)
GLOBULIN SER CALC-MCNC: 3.9 G/DL (ref 2–4)
GLUCOSE SERPL-MCNC: 148 MG/DL (ref 65–100)
HCT VFR BLD AUTO: 24.6 % (ref 36.6–50.3)
HGB BLD-MCNC: 8.2 G/DL (ref 12.1–17)
INR PPP: 1.1 (ref 0.9–1.1)
MCH RBC QN AUTO: 28.7 PG (ref 26–34)
MCHC RBC AUTO-ENTMCNC: 33.3 G/DL (ref 30–36.5)
MCV RBC AUTO: 86 FL (ref 80–99)
NRBC # BLD: 0 K/UL (ref 0–0.01)
NRBC BLD-RTO: 0 PER 100 WBC
PLATELET # BLD AUTO: 152 K/UL (ref 150–400)
PMV BLD AUTO: 11 FL (ref 8.9–12.9)
POTASSIUM SERPL-SCNC: 4.1 MMOL/L (ref 3.5–5.1)
PROT SERPL-MCNC: 7.6 G/DL (ref 6.4–8.2)
PROTHROMBIN TIME: 13.8 SEC (ref 11.9–14.6)
RBC # BLD AUTO: 2.86 M/UL (ref 4.1–5.7)
SODIUM SERPL-SCNC: 140 MMOL/L (ref 136–145)
THERAPEUTIC RANGE,PTTT: ABNORMAL SEC (ref 82–109)
WBC # BLD AUTO: 6 K/UL (ref 4.1–11.1)

## 2022-08-09 PROCEDURE — 85610 PROTHROMBIN TIME: CPT

## 2022-08-09 PROCEDURE — 85730 THROMBOPLASTIN TIME PARTIAL: CPT

## 2022-08-09 PROCEDURE — 80053 COMPREHEN METABOLIC PANEL: CPT

## 2022-08-09 PROCEDURE — 36415 COLL VENOUS BLD VENIPUNCTURE: CPT

## 2022-08-09 PROCEDURE — 93005 ELECTROCARDIOGRAM TRACING: CPT

## 2022-08-09 PROCEDURE — 85027 COMPLETE CBC AUTOMATED: CPT

## 2022-08-09 NOTE — PROGRESS NOTES
[phone] [id number]- Dr. Dominic Mejia called made aware of patient's abnormal labs CBC-- RBC 2.86, HGB 8.2, HCT 24.6, CMP-- Glucose 148, BUN 68, Creatinine 5.21, GFR est AA 14, GFR est non-AA 11, PTT 34.8. Also made aware patient last saw his nephrologist on 8/5/22 but the note is unsigned unable to obtain and Dr. Priya Land is out of the office this week. Made aware of the plan on the history and physical from 6/29/22 stating per Dr. Moreno Sees risk for potential need for dialysis s/t CKD 4 so will require admission if agree to Cardiac Cath. \" Dr. Dominic Mejia stated to have the patient come in and consult nephrology the morning of the procedure to discuss dialysis if patient is in agreement with plan will proceed and patient will be admitted. Dr. Dominic Mejia also made aware patient is having issues finding a ride for the procedure was educated to call the office if he is unsuccessful.

## 2022-08-09 NOTE — PROGRESS NOTES
1500- Patient stated during PAT that he is still in the process of finding someone to given him a ride to and from his procedure. Patient educated that it must be a friend or family member and that he cannot drive himself home nor take public transportation alone. Patient verbalized understanding. Instructed the patient that if he is unable to find a ride patient needs to reach out to the office, patient verbalized understanding.

## 2022-08-09 NOTE — PROGRESS NOTES
5- Dr. Nevaeh Tan called made aware patient stated during PAT that he has an allergy to contrast dye causes nausea and vomiting. Dr. Nevaeh Tan stated will give premedications morning of prior to procedure. Also made Dr. Nevaeh Tan aware history and physical from the office stated patient is high risk for needing dialysis following cath due to stage 4 kidney disease. Made aware patient stated he saw his nephrologist Dr. Angie Marsh this past Friday 8/5/22. Dr. Nevaeh Tan stated to have nephrology consulted morning of procedure. Also made Dr. Nevaeh Tan aware patient complained of left abdominal swelling no new orders received, stated ok to proceed.

## 2022-08-10 ENCOUNTER — APPOINTMENT (OUTPATIENT)
Dept: CARDIAC REHAB | Age: 57
End: 2022-08-10
Payer: MEDICAID

## 2022-08-11 ENCOUNTER — APPOINTMENT (OUTPATIENT)
Dept: CARDIAC REHAB | Age: 57
End: 2022-08-11
Payer: MEDICAID

## 2022-08-11 ENCOUNTER — HOSPITAL ENCOUNTER (OUTPATIENT)
Age: 57
Setting detail: OBSERVATION
Discharge: HOME | End: 2022-08-12
Attending: INTERNAL MEDICINE | Admitting: INTERNAL MEDICINE
Payer: MEDICAID

## 2022-08-11 DIAGNOSIS — R07.9 CHEST PAIN, UNSPECIFIED TYPE: ICD-10-CM

## 2022-08-11 PROBLEM — I12.0 HYPERTENSION IN STAGE 5 CHRONIC KIDNEY DISEASE DUE TO TYPE 2 DIABETES MELLITUS (HCC): Status: ACTIVE | Noted: 2022-08-11

## 2022-08-11 PROBLEM — R06.02 SOB (SHORTNESS OF BREATH): Status: ACTIVE | Noted: 2022-08-11

## 2022-08-11 PROBLEM — E11.22 HYPERTENSION IN STAGE 5 CHRONIC KIDNEY DISEASE DUE TO TYPE 2 DIABETES MELLITUS (HCC): Status: ACTIVE | Noted: 2022-08-11

## 2022-08-11 PROBLEM — N18.5 HYPERTENSION IN STAGE 5 CHRONIC KIDNEY DISEASE DUE TO TYPE 2 DIABETES MELLITUS (HCC): Status: ACTIVE | Noted: 2022-08-11

## 2022-08-11 LAB
ATRIAL RATE: 74 BPM
CALCULATED P AXIS, ECG09: 63 DEGREES
CALCULATED R AXIS, ECG10: 30 DEGREES
CALCULATED T AXIS, ECG11: 48 DEGREES
DIAGNOSIS, 93000: NORMAL
GLUCOSE BLD STRIP.AUTO-MCNC: 133 MG/DL (ref 65–117)
GLUCOSE BLD STRIP.AUTO-MCNC: 184 MG/DL (ref 65–117)
GLUCOSE BLD STRIP.AUTO-MCNC: 241 MG/DL (ref 65–117)
GLUCOSE BLD STRIP.AUTO-MCNC: 296 MG/DL (ref 65–117)
P-R INTERVAL, ECG05: 198 MS
PERFORMED BY, TECHID: ABNORMAL
Q-T INTERVAL, ECG07: 398 MS
QRS DURATION, ECG06: 82 MS
QTC CALCULATION (BEZET), ECG08: 441 MS
VENTRICULAR RATE, ECG03: 74 BPM

## 2022-08-11 PROCEDURE — 99153 MOD SED SAME PHYS/QHP EA: CPT | Performed by: INTERNAL MEDICINE

## 2022-08-11 PROCEDURE — 76210000006 HC OR PH I REC 0.5 TO 1 HR: Performed by: INTERNAL MEDICINE

## 2022-08-11 PROCEDURE — 77030019698 HC SYR ANGI MDLON MRTM -A: Performed by: INTERNAL MEDICINE

## 2022-08-11 PROCEDURE — 77030016700 HC CATH ANGI DX INFN2 CARD -B: Performed by: INTERNAL MEDICINE

## 2022-08-11 PROCEDURE — 74011636637 HC RX REV CODE- 636/637: Performed by: INTERNAL MEDICINE

## 2022-08-11 PROCEDURE — 77030004549 HC CATH ANGI DX PRF MRTM -A: Performed by: INTERNAL MEDICINE

## 2022-08-11 PROCEDURE — 74011000250 HC RX REV CODE- 250: Performed by: INTERNAL MEDICINE

## 2022-08-11 PROCEDURE — 74011250637 HC RX REV CODE- 250/637: Performed by: INTERNAL MEDICINE

## 2022-08-11 PROCEDURE — C1769 GUIDE WIRE: HCPCS | Performed by: INTERNAL MEDICINE

## 2022-08-11 PROCEDURE — 82962 GLUCOSE BLOOD TEST: CPT

## 2022-08-11 PROCEDURE — G0378 HOSPITAL OBSERVATION PER HR: HCPCS

## 2022-08-11 PROCEDURE — 74011250636 HC RX REV CODE- 250/636: Performed by: INTERNAL MEDICINE

## 2022-08-11 PROCEDURE — 93567 NJX CAR CTH SPRVLV AORTGRPHY: CPT | Performed by: INTERNAL MEDICINE

## 2022-08-11 PROCEDURE — C1894 INTRO/SHEATH, NON-LASER: HCPCS | Performed by: INTERNAL MEDICINE

## 2022-08-11 PROCEDURE — 77030040934 HC CATH DIAG DXTERITY MEDT -A: Performed by: INTERNAL MEDICINE

## 2022-08-11 PROCEDURE — 74011000636 HC RX REV CODE- 636: Performed by: INTERNAL MEDICINE

## 2022-08-11 PROCEDURE — 2709999900 HC NON-CHARGEABLE SUPPLY: Performed by: INTERNAL MEDICINE

## 2022-08-11 PROCEDURE — 93458 L HRT ARTERY/VENTRICLE ANGIO: CPT | Performed by: INTERNAL MEDICINE

## 2022-08-11 PROCEDURE — 77030025703 HC SYR ANGI VACLOK MRTM -A: Performed by: INTERNAL MEDICINE

## 2022-08-11 PROCEDURE — 99152 MOD SED SAME PHYS/QHP 5/>YRS: CPT | Performed by: INTERNAL MEDICINE

## 2022-08-11 RX ORDER — DIPHENHYDRAMINE HYDROCHLORIDE 50 MG/ML
INJECTION, SOLUTION INTRAMUSCULAR; INTRAVENOUS AS NEEDED
Status: DISCONTINUED | OUTPATIENT
Start: 2022-08-11 | End: 2022-08-11 | Stop reason: HOSPADM

## 2022-08-11 RX ORDER — CARVEDILOL 12.5 MG/1
25 TABLET ORAL 2 TIMES DAILY WITH MEALS
Status: DISCONTINUED | OUTPATIENT
Start: 2022-08-11 | End: 2022-08-11

## 2022-08-11 RX ORDER — MIDAZOLAM HYDROCHLORIDE 1 MG/ML
INJECTION INTRAMUSCULAR; INTRAVENOUS AS NEEDED
Status: DISCONTINUED | OUTPATIENT
Start: 2022-08-11 | End: 2022-08-11 | Stop reason: HOSPADM

## 2022-08-11 RX ORDER — SODIUM CHLORIDE 9 MG/ML
75 INJECTION, SOLUTION INTRAVENOUS CONTINUOUS
Status: DISCONTINUED | OUTPATIENT
Start: 2022-08-11 | End: 2022-08-12 | Stop reason: HOSPADM

## 2022-08-11 RX ORDER — NICARDIPINE HYDROCHLORIDE 2.5 MG/ML
INJECTION INTRAVENOUS AS NEEDED
Status: DISCONTINUED | OUTPATIENT
Start: 2022-08-11 | End: 2022-08-11 | Stop reason: HOSPADM

## 2022-08-11 RX ORDER — HYDRALAZINE HYDROCHLORIDE 50 MG/1
100 TABLET, FILM COATED ORAL 3 TIMES DAILY
Status: DISCONTINUED | OUTPATIENT
Start: 2022-08-11 | End: 2022-08-12 | Stop reason: HOSPADM

## 2022-08-11 RX ORDER — ACETAMINOPHEN 325 MG/1
650 TABLET ORAL
Status: DISCONTINUED | OUTPATIENT
Start: 2022-08-11 | End: 2022-08-12 | Stop reason: HOSPADM

## 2022-08-11 RX ORDER — MINOXIDIL 10 MG/1
10 TABLET ORAL DAILY
Status: DISCONTINUED | OUTPATIENT
Start: 2022-08-12 | End: 2022-08-12 | Stop reason: HOSPADM

## 2022-08-11 RX ORDER — FENTANYL CITRATE 50 UG/ML
INJECTION, SOLUTION INTRAMUSCULAR; INTRAVENOUS AS NEEDED
Status: DISCONTINUED | OUTPATIENT
Start: 2022-08-11 | End: 2022-08-11 | Stop reason: HOSPADM

## 2022-08-11 RX ORDER — ISOSORBIDE DINITRATE 20 MG/1
20 TABLET ORAL 3 TIMES DAILY
Status: DISCONTINUED | OUTPATIENT
Start: 2022-08-11 | End: 2022-08-12 | Stop reason: HOSPADM

## 2022-08-11 RX ORDER — HEPARIN SODIUM 200 [USP'U]/100ML
INJECTION, SOLUTION INTRAVENOUS
Status: COMPLETED | OUTPATIENT
Start: 2022-08-11 | End: 2022-08-11

## 2022-08-11 RX ORDER — FAMOTIDINE 10 MG/ML
INJECTION INTRAVENOUS AS NEEDED
Status: DISCONTINUED | OUTPATIENT
Start: 2022-08-11 | End: 2022-08-11 | Stop reason: HOSPADM

## 2022-08-11 RX ORDER — ATORVASTATIN CALCIUM 40 MG/1
40 TABLET, FILM COATED ORAL DAILY
Status: DISCONTINUED | OUTPATIENT
Start: 2022-08-11 | End: 2022-08-12 | Stop reason: HOSPADM

## 2022-08-11 RX ORDER — HEPARIN SODIUM 1000 [USP'U]/ML
INJECTION, SOLUTION INTRAVENOUS; SUBCUTANEOUS AS NEEDED
Status: DISCONTINUED | OUTPATIENT
Start: 2022-08-11 | End: 2022-08-11 | Stop reason: HOSPADM

## 2022-08-11 RX ORDER — CARVEDILOL 12.5 MG/1
37.5 TABLET ORAL 2 TIMES DAILY WITH MEALS
Status: DISCONTINUED | OUTPATIENT
Start: 2022-08-11 | End: 2022-08-12 | Stop reason: HOSPADM

## 2022-08-11 RX ORDER — MAGNESIUM SULFATE 100 %
4 CRYSTALS MISCELLANEOUS AS NEEDED
Status: DISCONTINUED | OUTPATIENT
Start: 2022-08-11 | End: 2022-08-12 | Stop reason: HOSPADM

## 2022-08-11 RX ORDER — SODIUM CHLORIDE 0.9 % (FLUSH) 0.9 %
5-40 SYRINGE (ML) INJECTION AS NEEDED
Status: DISCONTINUED | OUTPATIENT
Start: 2022-08-11 | End: 2022-08-12 | Stop reason: HOSPADM

## 2022-08-11 RX ORDER — INSULIN LISPRO 100 [IU]/ML
INJECTION, SOLUTION INTRAVENOUS; SUBCUTANEOUS
Status: DISCONTINUED | OUTPATIENT
Start: 2022-08-11 | End: 2022-08-12 | Stop reason: HOSPADM

## 2022-08-11 RX ORDER — SODIUM CHLORIDE 9 MG/ML
75 INJECTION, SOLUTION INTRAVENOUS CONTINUOUS
Status: DISCONTINUED | OUTPATIENT
Start: 2022-08-11 | End: 2022-08-11

## 2022-08-11 RX ORDER — AMLODIPINE BESYLATE 5 MG/1
10 TABLET ORAL DAILY
Status: DISCONTINUED | OUTPATIENT
Start: 2022-08-11 | End: 2022-08-12 | Stop reason: HOSPADM

## 2022-08-11 RX ORDER — DEXTROSE MONOHYDRATE 100 MG/ML
0-250 INJECTION, SOLUTION INTRAVENOUS AS NEEDED
Status: DISCONTINUED | OUTPATIENT
Start: 2022-08-11 | End: 2022-08-12 | Stop reason: HOSPADM

## 2022-08-11 RX ORDER — SODIUM BICARBONATE 650 MG/1
1300 TABLET ORAL 3 TIMES DAILY
Status: DISCONTINUED | OUTPATIENT
Start: 2022-08-11 | End: 2022-08-12 | Stop reason: HOSPADM

## 2022-08-11 RX ORDER — SODIUM CHLORIDE 0.9 % (FLUSH) 0.9 %
5-40 SYRINGE (ML) INJECTION EVERY 8 HOURS
Status: DISCONTINUED | OUTPATIENT
Start: 2022-08-11 | End: 2022-08-12 | Stop reason: HOSPADM

## 2022-08-11 RX ADMIN — CARVEDILOL 37.5 MG: 12.5 TABLET, FILM COATED ORAL at 16:25

## 2022-08-11 RX ADMIN — ISOSORBIDE DINITRATE 20 MG: 20 TABLET ORAL at 17:56

## 2022-08-11 RX ADMIN — SODIUM CHLORIDE, PRESERVATIVE FREE 5 ML: 5 INJECTION INTRAVENOUS at 14:49

## 2022-08-11 RX ADMIN — AMLODIPINE BESYLATE 10 MG: 5 TABLET ORAL at 16:25

## 2022-08-11 RX ADMIN — INSULIN LISPRO 2 UNITS: 100 INJECTION, SOLUTION INTRAVENOUS; SUBCUTANEOUS at 16:50

## 2022-08-11 RX ADMIN — ISOSORBIDE DINITRATE 20 MG: 20 TABLET ORAL at 21:50

## 2022-08-11 RX ADMIN — SODIUM BICARBONATE 1300 MG: 650 TABLET ORAL at 21:50

## 2022-08-11 RX ADMIN — HYDRALAZINE HYDROCHLORIDE 100 MG: 50 TABLET, FILM COATED ORAL at 16:24

## 2022-08-11 RX ADMIN — SODIUM CHLORIDE 50 ML/HR: 9 INJECTION, SOLUTION INTRAVENOUS at 07:32

## 2022-08-11 RX ADMIN — HYDRALAZINE HYDROCHLORIDE 100 MG: 50 TABLET, FILM COATED ORAL at 21:50

## 2022-08-11 RX ADMIN — SODIUM BICARBONATE 1300 MG: 650 TABLET ORAL at 16:24

## 2022-08-11 RX ADMIN — ATORVASTATIN CALCIUM 40 MG: 40 TABLET, FILM COATED ORAL at 16:25

## 2022-08-11 RX ADMIN — SODIUM CHLORIDE 75 ML/HR: 9 INJECTION, SOLUTION INTRAVENOUS at 12:01

## 2022-08-11 RX ADMIN — SODIUM CHLORIDE, PRESERVATIVE FREE 10 ML: 5 INJECTION INTRAVENOUS at 21:50

## 2022-08-11 NOTE — PROGRESS NOTES
CM attempted to meet with patient to deliver SON form however patient asleep at this time. CM to follow up later this date.

## 2022-08-11 NOTE — PROGRESS NOTES
Primary Nurse April Silver RN and [facility] performed a dual skin assessment on this patient No impairment noted  Dejon score is 18.

## 2022-08-11 NOTE — PROGRESS NOTES
Problem: Pressure Injury - Risk of  Goal: *Prevention of pressure injury  Description: Document Dejon Scale and appropriate interventions in the flowsheet. Outcome: Progressing Towards Goal  Note: Pressure Injury Interventions:  Sensory Interventions: Turn and reposition approx.  every two hours (pillows and wedges if needed), Assess changes in LOC, Keep linens dry and wrinkle-free, Maintain/enhance activity level, Minimize linen layers    Moisture Interventions: Moisture barrier, Absorbent underpads, Apply protective barrier, creams and emollients    Activity Interventions: Increase time out of bed    Mobility Interventions: Float heels, HOB 30 degrees or less, PT/OT evaluation    Nutrition Interventions: Document food/fluid/supplement intake                     Problem: Patient Education: Go to Patient Education Activity  Goal: Patient/Family Education  Outcome: Progressing Towards Goal

## 2022-08-11 NOTE — Clinical Note
Vessel: bilateral renalrenal. Power injection to the artery. PSI = 800. Injection rate = 10 mL/sec. Total injected volume = 20 mL.

## 2022-08-11 NOTE — CONSULTS
Renal Consult Note    Admit Date: 8/11/2022      HPI:   68-year-old -American gentleman with history of hypertension, diabetes and chronic kidney disease who is followed by  in the office. His creatinine has been around 5 mg and he was brought in for a cardiac catheterization as he failed a stress test.  There was also some suspicion of renal artery stenosis in this gentleman who has a pelvic kidney. He underwent a cardiac catheterization today with 60 mL of contrast being used. There is no evidence of renal artery stenosis and there was nonobstructive coronary disease. At present the patient is comfortable in bed. He sleeps on 2 pillows at home. He has been eating well without dysgeusia or nausea or vomiting. No headache. No chest pain or palpitation. He admits to nocturia. There is no history of nonsteroidal drug use.       Current Facility-Administered Medications   Medication Dose Route Frequency    sodium chloride 0.9 % bolus infusion 300 mL  300 mL IntraVENous ONCE    sodium chloride (NS) flush 5-40 mL  5-40 mL IntraVENous Q8H    sodium chloride (NS) flush 5-40 mL  5-40 mL IntraVENous PRN    acetaminophen (TYLENOL) tablet 650 mg  650 mg Oral Q4H PRN    amLODIPine (NORVASC) tablet 10 mg  10 mg Oral DAILY    atorvastatin (LIPITOR) tablet 40 mg  40 mg Oral DAILY    carvediloL (COREG) tablet 25 mg  25 mg Oral BID WITH MEALS    hydrALAZINE (APRESOLINE) tablet 100 mg  100 mg Oral TID    isosorbide mononitrate (ISMO, MONOKET) tablet 20 mg  20 mg Oral BID    minoxidiL (LONITEN) tablet 5 mg  5 mg Oral BID    sodium bicarbonate tablet 1,300 mg  1,300 mg Oral TID    0.9% sodium chloride infusion  75 mL/hr IntraVENous CONTINUOUS    glucose chewable tablet 16 g  4 Tablet Oral PRN    glucagon (GLUCAGEN) injection 1 mg  1 mg IntraMUSCular PRN    dextrose 10% infusion 0-250 mL  0-250 mL IntraVENous PRN    insulin lispro (HUMALOG) injection   SubCUTAneous TIDAC        Past Medical History:   Diagnosis Date    Arrhythmia     heart murmur    Chronic kidney disease     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     Diabetes ([address]     Hypertension     Iron deficiency anemia     Lumbar spondylosis     Sinus problem       Past Surgical History:   Procedure Laterality Date    HX ORTHOPAEDIC      Neck surgery     Family History   Problem Relation Age of Onset    Diabetes Mother     Dementia Father     No Known Problems Sister     Diabetes Sister     No Known Problems Sister     No Known Problems Sister     No Known Problems Sister     No Known Problems Brother     No Known Problems Brother     No Known Problems Brother       Social History     Tobacco Use    Smoking status: Never    Smokeless tobacco: Never   Substance Use Topics    Alcohol use: Not Currently         Review of Systems    Review of Systems   Respiratory:  Negative for cough and shortness of breath. Cardiovascular:  Negative for chest pain. Gastrointestinal:  Negative for abdominal pain. Neurological:  Negative for headaches. Physical Exam:     Physical Exam  Cardiovascular:      Rate and Rhythm: Regular rhythm. Heart sounds: No friction rub. Pulmonary:      Breath sounds: Normal breath sounds. No rales. Abdominal:      General: Bowel sounds are normal.      Palpations: Abdomen is soft. Musculoskeletal:      Right lower leg: Edema present. Left lower leg: Edema present. Neurological:      General: No focal deficit present. Mental Status: He is alert.    Minimal asterixis      Patient Vitals for the past 8 hrs:   BP Pulse Resp SpO2 Weight   08/11/22 1350 -- -- -- -- 75.4 kg (166 lb 3.6 oz)   08/11/22 1153 -- 79 -- -- --   08/11/22 1100 (!) 158/74 70 16 98 % --   08/11/22 1055 (!) 155/66 70 15 98 % --   08/11/22 1042 -- 72 17 95 % --   08/11/22 1040 (!) 162/73 73 18 91 % --   08/11/22 1035 (!) 156/71 74 19 90 % --   08/11/22 1034 -- 74 13 92 % --   08/11/22 1032 (!) 183/75 -- -- -- --     08/11 0701 - 08/11 1900  In: - Out: 350 [Urine:350]  No intake/output data recorded. No orders to display        Data Review   Recent Labs     08/09/22  1500   WBC 6.0   HGB 8.2*   HCT 24.6*        Recent Labs     08/09/22  1500      K 4.1      CO2 27   *   BUN 68*   CREA 5.21*   CA 8.7   ALB 3.7   ALT 39   INR 1.1     No components found for: GLPOC  No results for input(s): PH, PCO2, PO2, HCO3, FIO2 in the last 72 hours. Recent Labs     08/09/22  1500   INR 1.1         Assessment:           Active Problems:    Chest pain (6/26/2022)      SOB (shortness of breath) (8/11/2022)      Hypertension in stage 5 chronic kidney disease due to type 2 diabetes mellitus ([facility] (8/11/2022)    Stage V CKD    Hypertension under suboptimal control    Diabetes mellitus    Nonobstructive coronary artery disease    Anemia of chronic disease    History of pelvic kidney    Plan:   Continue with hydration  Repeat renal function tomorrow morning looking for signs of contrast-induced nephropathy. No pressing need to initiate extracorporeal therapy today. He could be a candidate for erythropoietin if blood pressures are under better control. Check a phosphorus level.   Thank you for this consult

## 2022-08-11 NOTE — Clinical Note
Ezequiel Meter  [id number]  1946    COLON DISCHARGE INSTRUCTIONS  Discomfort:  Redness at IV site- apply warm compress to area; if redness or soreness persist- contact your physician  There may be a slight amount of blood passed from the rectum  Gaseous discomfort- walking, belching will help relieve any discomfort  You may not operate a vehicle for 12 hours  You may not engage in an occupation involving machinery or appliances for rest of today  You may not drink alcoholic beverages for at least 12 hours  Avoid making any critical decisions for at least 24 hour  DIET:   Regular diet. - however -  remember your colon is empty and a heavy meal will produce gas. Avoid these foods:  vegetables, fried / greasy foods, carbonated drinks for today. MEDICATIONS:        Regarding Aspirin or Nonsteroidal medications, please see below. ACTIVITY:  You may resume your normal daily activities it is recommended that you spend the remainder of the day resting -  avoid any strenuous activity. CALL M.D. ANY SIGN OF:  Increasing pain, nausea, vomiting  Abdominal distension (swelling)  New increased bleeding (oral or rectal)  Fever (chills)  Pain in chest area  Bloody discharge from nose or mouth  Shortness of breath     Tylenol as needed for pain. Follow-up Instructions:   Call Dr. Too Salgado for results of procedure / biopsy in 4-5 days at telephone #  [phone]              Repeat Colonoscopy in 10 years          DO NOT TAKE SLEEPING MEDICATIONS OR ANTIANXIETY MEDICATIONS WHILE TAKING NARCOTIC PAIN MEDICATIONS,  ESPECIALLY THE NIGHT OF ANESTHESIA. CPAP PATIENTS BE SURE TO WEAR MACHINE WHENEVER NAPPING OR SLEEPING.     DISCHARGE SUMMARY from Nurse    The following personal items collected during your admission are returned to you:   Dental Appliance: Dental Appliances: None  Vision: Visual Aid: Glasses (Glasses in PACU)  Hearing Aid:    Jewelry:    Clothing:    Other Valuables:    Valuables sent to safe: TRANSFER - OUT REPORT:     Verbal report given to: Noemy (at bedside). Report consisted of patient's Situation, Background, Assessment and   Recommendations(SBAR). Opportunity for questions and clarification was provided. Patient transported with a Registered Nurse. Patient transported to: PACU. PATIENT INSTRUCTIONS:    Anesthesia Discharge Instructions for Procedural Area requiring Sedation (MAC Anesthesia, Cath Lab, Endo and Radiology): You have been given medications during your procedure that may affect your memory and mental judgement for the next 24 hours. During this time frame for your safety, please follow the instructions listed below :    Have a responsible adult to drive you home and be with you for at least 12 hours.  Rest today and resume normal activities tomorrow.  Start with a soft bland diet and advance as tolerated to your recommended diet.  Do not drive any motor vehicle or operate mechanical or electrical equipment prior to [employer].  Avoid making critical decisions or signing legal documents prior to 6am tomorrow.  Do not drink alcohol prior to 6am tomorrow.  If you have sleep apnea and you plan to go home and take a nap, please use your CPAP machine not only at bedtime, but also while napping for 24 hours.  If you notice any redness or swelling on parts of your body where IV medications were given, place a warm wet washcloth over the area for 20 minutes at a time until the redness or swelling goes away. If you still have redness or swelling after 2-3 days, please call us. · You will receive a Post Operative Call from one of the Recovery Room Nurses on the day after your surgery to check on you. It is very important for us to know how you are recovering after your surgery. If you have an issue or need to speak with someone, please call your surgeon, do not wait for the post operative call. · You may receive an e-mail or letter in the mail from [employer] regarding your experience with us in the Ambulatory Surgery Unit. Your feedback is valuable to us and we appreciate your participation in the survey. · We wish you a speedy recovery ?         What to do at Home:      *  Please give a list of your current medications to your Primary Care Provider. *  Please update this list whenever your medications are discontinued, doses are      changed, or new medications (including over-the-counter products) are added. *  Please carry medication information at all times in case of emergency situations. If you have not received your influenza and/or pneumococcal vaccine, please follow up with your primary care physician. The discharge information has been reviewed with the patient and caregiver. The patient and caregiver verbalized understanding.

## 2022-08-11 NOTE — H&P
History and Physical    Subjective:     Vicky Gtz is a 60 y.o.  male with a past medical history of hypertension, hyperlipidemia, and DM2 who presented to the hospital for an outpatient procedure. Patient underwent an angiography with bilateral runoff and a left heart catheterization. Past Medical History:   Diagnosis Date    Arrhythmia     heart murmur    Chronic kidney disease     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     Diabetes ([address]     Hypertension     Iron deficiency anemia     Lumbar spondylosis     Sinus problem       Past Surgical History:   Procedure Laterality Date    HX ORTHOPAEDIC      Neck surgery     Family History   Problem Relation Age of Onset    Diabetes Mother     Dementia Father     No Known Problems Sister     Diabetes Sister     No Known Problems Sister     No Known Problems Sister     No Known Problems Sister     No Known Problems Brother     No Known Problems Brother     No Known Problems Brother       Social History     Tobacco Use    Smoking status: Never    Smokeless tobacco: Never   Substance Use Topics    Alcohol use: Not Currently       Prior to Admission medications    Medication Sig Start Date End Date Taking? Authorizing Provider   minoxidiL (LONITEN) 10 mg tablet Take 5 mg by mouth two (2) times a day. Yes Provider, Historical   hydrALAZINE (APRESOLINE) 100 mg tablet Take 1 Tablet by mouth three (3) times daily. 6/28/22  Yes Zachary Escobar MD   carvediloL (COREG) 25 mg tablet Take 1 Tablet by mouth two (2) times daily (with meals). 5/26/22  Yes Mila Montiel, PA   amLODIPine (NORVASC) 10 mg tablet Take 10 mg by mouth daily. Yes Provider, Historical   atorvastatin (LIPITOR) 40 mg tablet Take 40 mg by mouth daily. Yes Provider, Historical   isosorbide mononitrate (ISMO, MONOKET) 20 mg tablet Take 20 mg by mouth two (2) times a day.    Yes Provider, Historical   SITagliptin (JANUVIA) 50 mg tablet Take 100 mg by mouth in the morning. Yes Provider, Historical   sodium bicarbonate 650 mg tablet Take 1,300 mg by mouth three (3) times daily. Yes Provider, Historical   glipiZIDE (GLUCOTROL) 5 mg tablet Take 5 mg by mouth two (2) times a day. Yes Provider, Historical     Allergies   Allergen Reactions    Lisinopril Other (comments)     Patient stated \"It messed my kidneys up\"    Contrast Dye [Iodine] Nausea and Vomiting        Review of Systems:  A comprehensive review of systems was negative except for that written in the History of Present Illness. Objective: Intake and Output:    No intake/output data recorded. No intake/output data recorded. Physical Exam:   Visit Vitals  BP (!) 162/73   Pulse 72   Temp 98.4 °F (36.9 °C)   Resp 17   SpO2 95%     General:  Alert, cooperative, no distress, appears stated age. Head:  Normocephalic, without obvious abnormality, atraumatic. Eyes:  Conjunctivae/corneas clear. PERRL, EOMs intact. Fundi benign   Ears:  Normal TMs and external ear canals both ears. Nose: Nares normal. Septum midline. Mucosa normal. No drainage or sinus tenderness. Throat: Lips, mucosa, and tongue normal. Teeth and gums normal.   Neck: Supple, symmetrical, trachea midline, no adenopathy, thyroid: no enlargement/tenderness/nodules, no carotid bruit and no JVD. Back:   Symmetric, no curvature. ROM normal. No CVA tenderness. Lungs:   Clear to auscultation bilaterally. Chest wall:  No tenderness or deformity. Heart:  Regular rate and rhythm, S1, S2 normal, no murmur, click, rub or gallop. Abdomen:   Soft, non-tender. Bowel sounds normal. No masses,  No organomegaly. Genitalia:  Normal male without lesion, discharge or tenderness. Rectal:  Normal tone, normal prostate, no masses or tenderness  Guaiac negative stool. Extremities: Extremities normal, atraumatic, no cyanosis or edema. Pulses: 2+ and symmetric all extremities.    Skin: Skin color, texture, turgor normal. No rashes or lesions Lymph nodes: Cervical, supraclavicular, and axillary nodes normal.   Neurologic: CNII-XII intact. Normal strength, sensation and reflexes throughout. ECG:  normal EKG, normal sinus rhythm, unchanged from previous tracings     Data Review:   Recent Results (from the past 24 hour(s))   GLUCOSE, POC    Collection Time: 08/11/22  7:25 AM   Result Value Ref Range    Glucose (POC) 133 (H) 65 - 117 mg/dL    Performed by Aster Ding        Assessment:     Active Problems:    Chest pain (6/26/2022)      SOB (shortness of breath) (8/11/2022)        Plan:   Case was discussed with Dr. Tenzin Guadarrama and our impression and recommendations are as follows:       1. Chest Pain: ACS ruled out with HS trop trends of 36-38-55 and EKG criteria during hospitalization 6/27-6/28. TTE 5/25/22 EF 55-60% with severe wall thickening and diastolic dysfunction. PNST completed 6/28 prior to discharge +inferior ischemia. Spoke with Dr. Kala Miranda 06/29 and he reported  the patient will be high risk for potential need for dialysis s/t CKD 4. Patient to be admitted for observation. Nephrology consulted, appreciate recommendations. 2. HTN: blood pressure remains elevated. Continue home medications. There is concern for RAMBO. 3. Chronic HFpEF: Continue GDMT. 4. Multivalvular Disease: Moderate MVR, PVR, TVR noted on TTE 5/25/22. Continue  diuretics and HTN control. Appreciate nephrology assistance with diuretic regimen. 5. CKD4: Followed by Dr. Kala Miranda. Appreciate his assistance with management. Dr. Cal Castillo Cardiology  [facility].    [address] [place] [address], [address] [place] [address]  [phone]

## 2022-08-11 NOTE — PROGRESS NOTES
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       Per patient it is ok for friend, shawn shepard to receive discharge instructions.

## 2022-08-11 NOTE — PROGRESS NOTES
Per Dr. Joseph Welch gave telephone orders for a 300 ml bolus slowly then 75 ml/hr after that. Will continue to monitor.

## 2022-08-11 NOTE — PROGRESS NOTES
Attempted to consult nephrology and was not able to get through at this time. Will continue to monitor.

## 2022-08-12 VITALS
DIASTOLIC BLOOD PRESSURE: 72 MMHG | OXYGEN SATURATION: 95 % | BODY MASS INDEX: 21.93 KG/M2 | RESPIRATION RATE: 18 BRPM | HEART RATE: 71 BPM | WEIGHT: 166.23 LBS | SYSTOLIC BLOOD PRESSURE: 152 MMHG | TEMPERATURE: 97.8 F

## 2022-08-12 LAB
ALBUMIN SERPL-MCNC: 3.4 G/DL (ref 3.5–5)
ALBUMIN/GLOB SERPL: 0.9 {RATIO} (ref 1.1–2.2)
ALP SERPL-CCNC: 94 U/L (ref 45–117)
ALT SERPL-CCNC: 35 U/L (ref 12–78)
ANION GAP SERPL CALC-SCNC: 9 MMOL/L (ref 5–15)
AST SERPL W P-5'-P-CCNC: 19 U/L (ref 15–37)
BASOPHILS # BLD: 0 K/UL (ref 0–0.1)
BASOPHILS NFR BLD: 0 % (ref 0–1)
BILIRUB SERPL-MCNC: 0.3 MG/DL (ref 0.2–1)
BUN SERPL-MCNC: 81 MG/DL (ref 6–20)
BUN/CREAT SERPL: 14 (ref 12–20)
CA-I BLD-MCNC: 8.2 MG/DL (ref 8.5–10.1)
CHLORIDE SERPL-SCNC: 107 MMOL/L (ref 97–108)
CHOLEST SERPL-MCNC: 130 MG/DL
CO2 SERPL-SCNC: 24 MMOL/L (ref 21–32)
CREAT SERPL-MCNC: 5.74 MG/DL (ref 0.7–1.3)
DIFFERENTIAL METHOD BLD: ABNORMAL
EOSINOPHIL # BLD: 0 K/UL (ref 0–0.4)
EOSINOPHIL NFR BLD: 0 % (ref 0–7)
ERYTHROCYTE [DISTWIDTH] IN BLOOD BY AUTOMATED COUNT: 13.3 % (ref 11.5–14.5)
GLOBULIN SER CALC-MCNC: 3.6 G/DL (ref 2–4)
GLUCOSE BLD STRIP.AUTO-MCNC: 131 MG/DL (ref 65–117)
GLUCOSE BLD STRIP.AUTO-MCNC: 250 MG/DL (ref 65–117)
GLUCOSE BLD STRIP.AUTO-MCNC: 260 MG/DL (ref 65–117)
GLUCOSE SERPL-MCNC: 212 MG/DL (ref 65–100)
HCT VFR BLD AUTO: 23.6 % (ref 36.6–50.3)
HDLC SERPL-MCNC: 49 MG/DL
HDLC SERPL: 2.7 {RATIO} (ref 0–5)
HGB BLD-MCNC: 7.7 G/DL (ref 12.1–17)
IMM GRANULOCYTES # BLD AUTO: 0 K/UL (ref 0–0.04)
IMM GRANULOCYTES NFR BLD AUTO: 0 % (ref 0–0.5)
LDLC SERPL CALC-MCNC: 67.2 MG/DL (ref 0–100)
LIPID PROFILE,FLP: NORMAL
LYMPHOCYTES # BLD: 1.1 K/UL (ref 0.8–3.5)
LYMPHOCYTES NFR BLD: 12 % (ref 12–49)
MCH RBC QN AUTO: 28 PG (ref 26–34)
MCHC RBC AUTO-ENTMCNC: 32.6 G/DL (ref 30–36.5)
MCV RBC AUTO: 85.8 FL (ref 80–99)
MONOCYTES # BLD: 1.3 K/UL (ref 0–1)
MONOCYTES NFR BLD: 13 % (ref 5–13)
NEUTS SEG # BLD: 7.1 K/UL (ref 1.8–8)
NEUTS SEG NFR BLD: 75 % (ref 32–75)
NRBC # BLD: 0 K/UL (ref 0–0.01)
NRBC BLD-RTO: 0 PER 100 WBC
PERFORMED BY, TECHID: ABNORMAL
PHOSPHATE SERPL-MCNC: 5.1 MG/DL (ref 2.6–4.7)
PLATELET # BLD AUTO: 143 K/UL (ref 150–400)
PMV BLD AUTO: 11.1 FL (ref 8.9–12.9)
POTASSIUM SERPL-SCNC: 4.7 MMOL/L (ref 3.5–5.1)
PROT SERPL-MCNC: 7 G/DL (ref 6.4–8.2)
RBC # BLD AUTO: 2.75 M/UL (ref 4.1–5.7)
SODIUM SERPL-SCNC: 140 MMOL/L (ref 136–145)
TRIGL SERPL-MCNC: 69 MG/DL (ref ?–150)
VLDLC SERPL CALC-MCNC: 13.8 MG/DL
WBC # BLD AUTO: 9.5 K/UL (ref 4.1–11.1)

## 2022-08-12 PROCEDURE — 74011000250 HC RX REV CODE- 250: Performed by: INTERNAL MEDICINE

## 2022-08-12 PROCEDURE — 80061 LIPID PANEL: CPT

## 2022-08-12 PROCEDURE — 74011250637 HC RX REV CODE- 250/637: Performed by: INTERNAL MEDICINE

## 2022-08-12 PROCEDURE — 85025 COMPLETE CBC W/AUTO DIFF WBC: CPT

## 2022-08-12 PROCEDURE — 84100 ASSAY OF PHOSPHORUS: CPT

## 2022-08-12 PROCEDURE — G0378 HOSPITAL OBSERVATION PER HR: HCPCS

## 2022-08-12 PROCEDURE — 82962 GLUCOSE BLOOD TEST: CPT

## 2022-08-12 PROCEDURE — 36415 COLL VENOUS BLD VENIPUNCTURE: CPT

## 2022-08-12 PROCEDURE — 80053 COMPREHEN METABOLIC PANEL: CPT

## 2022-08-12 PROCEDURE — 74011636637 HC RX REV CODE- 636/637: Performed by: INTERNAL MEDICINE

## 2022-08-12 RX ORDER — CARVEDILOL 12.5 MG/1
37.5 TABLET ORAL 2 TIMES DAILY WITH MEALS
Qty: 180 TABLET | Refills: 1 | Status: SHIPPED | OUTPATIENT
Start: 2022-08-12

## 2022-08-12 RX ORDER — AMLODIPINE BESYLATE 10 MG/1
10 TABLET ORAL DAILY
Qty: 60 TABLET | Refills: 1 | Status: SHIPPED | OUTPATIENT
Start: 2022-08-13

## 2022-08-12 RX ADMIN — SODIUM BICARBONATE 1300 MG: 650 TABLET ORAL at 16:04

## 2022-08-12 RX ADMIN — HYDRALAZINE HYDROCHLORIDE 100 MG: 50 TABLET, FILM COATED ORAL at 09:13

## 2022-08-12 RX ADMIN — CARVEDILOL 37.5 MG: 12.5 TABLET, FILM COATED ORAL at 16:04

## 2022-08-12 RX ADMIN — MINOXIDIL 10 MG: 10 TABLET ORAL at 09:14

## 2022-08-12 RX ADMIN — SODIUM BICARBONATE 1300 MG: 650 TABLET ORAL at 09:14

## 2022-08-12 RX ADMIN — ISOSORBIDE DINITRATE 20 MG: 20 TABLET ORAL at 09:13

## 2022-08-12 RX ADMIN — CARVEDILOL 37.5 MG: 12.5 TABLET, FILM COATED ORAL at 09:13

## 2022-08-12 RX ADMIN — ATORVASTATIN CALCIUM 40 MG: 40 TABLET, FILM COATED ORAL at 09:14

## 2022-08-12 RX ADMIN — SODIUM CHLORIDE, PRESERVATIVE FREE 10 ML: 5 INJECTION INTRAVENOUS at 13:55

## 2022-08-12 RX ADMIN — INSULIN LISPRO 6 UNITS: 100 INJECTION, SOLUTION INTRAVENOUS; SUBCUTANEOUS at 09:14

## 2022-08-12 RX ADMIN — AMLODIPINE BESYLATE 10 MG: 5 TABLET ORAL at 09:14

## 2022-08-12 RX ADMIN — HYDRALAZINE HYDROCHLORIDE 100 MG: 50 TABLET, FILM COATED ORAL at 16:04

## 2022-08-12 RX ADMIN — ISOSORBIDE DINITRATE 20 MG: 20 TABLET ORAL at 16:04

## 2022-08-12 NOTE — PROGRESS NOTES
Patient ID:  Nils Basurto  [id number]  60 y.o.  1965    Allergies: Lisinopril and Contrast dye [iodine]    Admit Date: 8/11/2022    Discharge Date:  08/13/22    Admitting Physician: Bettye Manuel MD     Discharge Physician: Caitie Morin NP    * Admission Diagnoses: Chest pain, unspecified type [R07.9]  Chest pain [R07.9]  SOB (shortness of breath) [R06.02]  Hypertension in stage 5 chronic kidney disease due to type 2 diabetes mellitus ([facility] [E11.22, I12.0, N18.5]    * Discharge Diagnoses:   Hospital Problems as of 8/12/2022 Date Reviewed: 5/26/2022            Codes Class Noted - Resolved POA    SOB (shortness of breath) ICD-10-CM: R06.02  ICD-9-CM: 786.05  8/11/2022 - Present Unknown        Hypertension in stage 5 chronic kidney disease due to type 2 diabetes mellitus ([facility] ICD-10-CM: E11.22, I12.0, N18.5  ICD-9-CM: 250.40, 403.91, 585.5  8/11/2022 - Present Unknown        Chest pain ICD-10-CM: R07.9  ICD-9-CM: 786.50  6/26/2022 - Present Unknown           * Discharge Condition: stable    * Cardiology Procedures this Admission:   LHC and arteriogram with run-off      * Hospital Course: Nils Basurto is a 60 y.o.  male with a past medical history of hypertension, hyperlipidemia, and DM2 who presented to the hospital for an outpatient procedure. Patient underwent an angiography with bilateral runoff and a left heart catheterization. Patient is awake and alert today. No complaints of chest pain or shortness of breath. He is feeling well overall. Awaiting PM labs to check renal function. Discharge Exam:   Visit Vitals  BP (!) 148/68 (BP 1 Location: Right upper arm, BP Patient Position: Sitting)   Pulse 70   Temp 98 °F (36.7 °C)   Resp 18   Wt 75.4 kg (166 lb 3.6 oz)   SpO2 98%   BMI 21.93 kg/m²     General Appearance:  Well developed, well nourished,alert and oriented x 3, and individual in no acute distress.    Ears/Nose/Mouth/Throat:   Hearing grossly normal.         Neck: Supple. Chest:   Lungs clear to auscultation bilaterally. Cardiovascular:  Regular rate and rhythm, S1, S2 normal, no murmur. Abdomen:   Soft, non-tender, bowel sounds are active. Extremities: No edema bilaterally. Skin: Warm and dry. Right radial site, with no hematoma, and pulses intact. Disposition: Home    Discharge Medications:   Amlodipine 10 mg -  1 tablet daily  Atorvastatin 40 mg -  1 tablet daily  Carvedilol (Coreg) 12.5 mg - 3 tablets by mouth  twice a day. Hydralazine 100 mg 1 tablet three (3) times a day. Isosorbide 20 mg - 1 tablet three (3) times a day. Minoxidil 10 mg - 1 tablet daily      * Follow-up Care/Patient Instructions: Activity:Activity as tolerated  Diet: Renal diet  Wound Care: None needed    Cardiac Catheterization/Angiography Discharge Instructions  *Check the puncture site frequently for swelling or bleeding. If you see any bleeding, lie down and apply pressure over the area with a clean town or washcloth. Notify your doctor for any redness, swelling, drainage or oozing from the puncture site. Notify your doctor for any fever or chills. *If the leg or arm with the -puncture becomes cold, numb or painful, call Dr Althea Aguilar  at  [phone]  *Activity should be limited for the next 48 hours. Climb stairs as little as possible and avoid any stooping, bending or strenuous activity for 48 hours. No heavy lifting (anything over 10 pounds) for three days. *Do not drive for 48 hours. *You may resume your usual diet. Drink more fluids than usual.  *Have a responsible person drive you home and stay with you for at least 24 hours after your heart catheterization/angiography. *You may remove the bandage from your Right and Arm in 24 hours. You may shower in 24 hours. No tub baths, hot tubs or swimming for one week. Do not place any lotions, creams, powders, ointments over the puncture site for one week.  You may place a clean band-aid over the puncture site each day for 5 days. Change this daily. Follow-up Information       Follow up With Specialties Details Why Contact Info    Qamar Jorge MD [facility] 22      Carito Soriano MD Cardio Vascular Surgery, Cardiovascular Disease Physician Follow up F/u 8/24 at 1:30 in [address] [place] [address]  [phone]              Signed:   Keanu Collado NP  8/12/2022  2:32 PM

## 2022-08-12 NOTE — PROGRESS NOTES
Medicare Outpatient Observation Notice (MOON)/ [place] Outpatient Observation Notice (Esteban Gruber) provided to patient/representative with verbal explanation of the notice. Time allotted for questions regarding the notice. Patient /representative provided a completed copy of the MOON/CARMENON notice. Copy placed on bedside chart.

## 2022-08-12 NOTE — PROGRESS NOTES
Progress Note  Date:2022       Room:[facility]  Patient Name:Bertrand Overton     Date of Birth:[birth year]     Age:56 y.o. Subjective    Subjective   Patient remains hemodynamically stable. Continued to be receiving IV fluids with normal saline at 75 mL an hour. Blood pressure remains well controlled. Patient underwent cardiac catheterization on . Since then patient's labs have generally remained stable though his serum creatinine remains elevated at 5.74 compared to prior baseline. Patient reports some shortness of breath today. Otherwise he reports no worsening swelling. He is able to tolerate diet. Review of Systems   Constitutional: Negative. Respiratory: Negative. Cardiovascular: Negative. Gastrointestinal: Negative. Genitourinary: Negative. Objective         Vitals Last 24 Hours:  TEMPERATURE:  Temp  Av.2 °F (36.8 °C)  Min: 97.8 °F (36.6 °C)  Max: 98.5 °F (36.9 °C)  RESPIRATIONS RANGE: Resp  Av  Min: 18  Max: 18  PULSE OXIMETRY RANGE: SpO2  Av.7 %  Min: 94 %  Max: 99 %  PULSE RANGE: Pulse  Av.2  Min: 70  Max: 85  BLOOD PRESSURE RANGE: Systolic (89QAC), DJY:888 , Min:141 , PDC:701   ; Diastolic (11DXL), KKK:48, Min:58, Max:79    I/O (24Hr): Intake/Output Summary (Last 24 hours) at 2022 1451  Last data filed at 2022 1211  Gross per 24 hour   Intake 1156 ml   Output 580 ml   Net 576 ml     Objective:  Vital signs: (most recent): Blood pressure (!) 152/72, pulse 71, temperature 97.8 °F (36.6 °C), resp. rate 18, weight 75.4 kg (166 lb 3.6 oz), SpO2 95 %. Vital signs are normal.    Lungs:  Normal effort. Heart: Normal rate. Abdomen: Abdomen is soft. Extremities: Normal range of motion. There is no dependent edema.     Labs/Imaging/Diagnostics    Labs:  CBC:  Recent Labs     22  0840 22  1500   WBC 9.5 6.0   RBC 2.75* 2.86*   HGB 7.7* 8.2*   HCT 23.6* 24.6*   MCV 85.8 86.0   RDW 13.3 13.4   * 152     CHEMISTRIES:  Recent Labs     08/12/22  0840 08/09/22  1500    140   K 4.7 4.1    107   CO2 24 27   BUN 81* 68*   CA 8.2* 8.7   PHOS 5.1*  --    PT/INR:  Recent Labs     08/09/22  1500   INR 1.1     APTT:  Recent Labs     08/09/22  1500   APTT 34.8*     LIVER PROFILE:  Recent Labs     08/12/22  0840 08/09/22  1500   AST 19 24   ALT 35 39     Lab Results   Component Value Date/Time    ALT (SGPT) 35 08/12/2022 08:40 AM    AST (SGOT) 19 08/12/2022 08:40 AM    Alk. phosphatase 94 08/12/2022 08:40 AM    Bilirubin, total 0.3 08/12/2022 08:40 AM       Imaging Last 24 Hours:  No results found. Assessment//Plan   Active Problems:    Chest pain (6/26/2022)      SOB (shortness of breath) (8/11/2022)      Hypertension in stage 5 chronic kidney disease due to type 2 diabetes mellitus ([facility] (8/11/2022)      Assessment:   (Chronic kidney disease stage IV/V  Volume overload in the setting of iatrogenic fluid infusion in the setting of contrast study  Anemia of chronic renal disease  Diabetes mellitus  Renal osteodystrophy  Hyperphosphatemia    Plan  - Can discontinue further infusion of IV fluids  - Can repeat renal function and if remains stable patient can be discharged with outpatient follow-up with nephrology  - Discussed with the patient regarding need for dialysis in near future. Patient has opted for in center hemodialysis as his modality of choice  ).      Electronically signed by Sourav Maria MD on 8/12/2022 at 2:51 PM

## 2022-08-12 NOTE — PROGRESS NOTES
Patient discharge instructions given to patient and patient verbalized understanding. IV removed per protocol. No bleeding. Belongings with patient. Patient waiting for ride. 1638  Patient extremely agitated about discharge so \"late in the day\" and has cursed at staff and threatened to hit staff stating that \"he may not leave\". Will reassess    1657  Patient seems to have calmed down. States that daughter is on the way to pick him up.

## 2022-08-12 NOTE — PROGRESS NOTES
Problem: Pressure Injury - Risk of  Goal: *Prevention of pressure injury  Description: Document Dejon Scale and appropriate interventions in the flowsheet. Outcome: Progressing Towards Goal  Note: Pressure Injury Interventions:  Sensory Interventions: Assess changes in LOC, Monitor skin under medical devices, Keep linens dry and wrinkle-free    Moisture Interventions: Absorbent underpads, Minimize layers    Activity Interventions: Increase time out of bed    Mobility Interventions: HOB 30 degrees or less    Nutrition Interventions: Document food/fluid/supplement intake, Offer support with meals,snacks and hydration    Friction and Shear Interventions: HOB 30 degrees or less, Minimize layers                Problem: Patient Education: Go to Patient Education Activity  Goal: Patient/Family Education  Outcome: Progressing Towards Goal     Problem: Falls - Risk of  Goal: *Absence of Falls  Description: Document Cintia Fall Risk and appropriate interventions in the flowsheet.   Outcome: Progressing Towards Goal  Note: Fall Risk Interventions:            Medication Interventions: Patient to call before getting OOB, Teach patient to arise slowly                   Problem: Patient Education: Go to Patient Education Activity  Goal: Patient/Family Education  Outcome: Progressing Towards Goal

## 2022-08-12 NOTE — PROGRESS NOTES
Problem: Pressure Injury - Risk of  Goal: *Prevention of pressure injury  Description: Document Dejon Scale and appropriate interventions in the flowsheet. Outcome: Progressing Towards Goal  Note: Pressure Injury Interventions:  Sensory Interventions: Monitor skin under medical devices, Minimize linen layers, Keep linens dry and wrinkle-free, Float heels    Moisture Interventions: Minimize layers, Apply protective barrier, creams and emollients, Absorbent underpads    Activity Interventions: PT/OT evaluation, Increase time out of bed    Mobility Interventions: Float heels, HOB 30 degrees or less, PT/OT evaluation    Nutrition Interventions: Document food/fluid/supplement intake, Offer support with meals,snacks and hydration    Friction and Shear Interventions: HOB 30 degrees or less, Apply protective barrier, creams and emollients                Problem: Patient Education: Go to Patient Education Activity  Goal: Patient/Family Education  Outcome: Progressing Towards Goal     Problem: Falls - Risk of  Goal: *Absence of Falls  Description: Document Cintia Fall Risk and appropriate interventions in the flowsheet.   Outcome: Progressing Towards Goal  Note: Fall Risk Interventions:            Medication Interventions: Bed/chair exit alarm, Patient to call before getting OOB, Teach patient to arise slowly

## 2022-08-15 ENCOUNTER — APPOINTMENT (OUTPATIENT)
Dept: CARDIAC REHAB | Age: 57
End: 2022-08-15
Payer: MEDICAID

## 2022-08-22 ENCOUNTER — HOSPITAL ENCOUNTER (OUTPATIENT)
Dept: CARDIAC REHAB | Age: 57
Discharge: HOME | End: 2022-08-22
Payer: MEDICAID

## 2022-08-22 VITALS — BODY MASS INDEX: 20.24 KG/M2 | WEIGHT: 153.4 LBS

## 2022-08-22 PROCEDURE — 93798 PHYS/QHP OP CAR RHAB W/ECG: CPT

## 2022-08-25 ENCOUNTER — HOSPITAL ENCOUNTER (OUTPATIENT)
Dept: CARDIAC REHAB | Age: 57
Discharge: HOME | End: 2022-08-25
Payer: MEDICAID

## 2022-08-25 VITALS — WEIGHT: 154 LBS | BODY MASS INDEX: 20.32 KG/M2

## 2022-08-25 PROCEDURE — 93798 PHYS/QHP OP CAR RHAB W/ECG: CPT

## 2022-08-29 ENCOUNTER — HOSPITAL ENCOUNTER (OUTPATIENT)
Dept: CARDIAC REHAB | Age: 57
Discharge: HOME | End: 2022-08-29
Payer: MEDICAID

## 2022-08-29 VITALS — WEIGHT: 155 LBS | BODY MASS INDEX: 20.45 KG/M2

## 2022-08-29 PROCEDURE — 93798 PHYS/QHP OP CAR RHAB W/ECG: CPT

## 2022-08-31 ENCOUNTER — HOSPITAL ENCOUNTER (OUTPATIENT)
Dept: CARDIAC REHAB | Age: 57
Discharge: HOME | End: 2022-08-31
Payer: MEDICAID

## 2022-08-31 VITALS — WEIGHT: 150.6 LBS | BODY MASS INDEX: 19.87 KG/M2

## 2022-08-31 PROCEDURE — 93797 PHYS/QHP OP CAR RHAB WO ECG: CPT

## 2022-08-31 PROCEDURE — 93798 PHYS/QHP OP CAR RHAB W/ECG: CPT

## 2022-09-01 ENCOUNTER — HOSPITAL ENCOUNTER (OUTPATIENT)
Dept: CARDIAC REHAB | Age: 57
Discharge: HOME | End: 2022-09-01
Payer: MEDICAID

## 2022-09-01 VITALS — BODY MASS INDEX: 19.87 KG/M2 | WEIGHT: 150.6 LBS

## 2022-09-01 PROCEDURE — 93798 PHYS/QHP OP CAR RHAB W/ECG: CPT

## 2022-09-01 RX ORDER — FUROSEMIDE 40 MG/1
40 TABLET ORAL 2 TIMES DAILY
COMMUNITY

## 2022-09-01 RX ORDER — ISOSORBIDE MONONITRATE 10 MG/1
10 TABLET ORAL 3 TIMES DAILY
COMMUNITY

## 2022-09-07 ENCOUNTER — HOSPITAL ENCOUNTER (OUTPATIENT)
Dept: CARDIAC REHAB | Age: 57
End: 2022-09-07
Payer: MEDICAID

## 2022-09-07 ENCOUNTER — HOSPITAL ENCOUNTER (OUTPATIENT)
Dept: CARDIAC REHAB | Age: 57
Discharge: HOME | End: 2022-09-07
Payer: MEDICAID

## 2022-09-08 ENCOUNTER — APPOINTMENT (OUTPATIENT)
Dept: CARDIAC REHAB | Age: 57
End: 2022-09-08
Payer: MEDICAID

## 2022-09-12 ENCOUNTER — HOSPITAL ENCOUNTER (OUTPATIENT)
Dept: CARDIAC REHAB | Age: 57
Discharge: HOME | End: 2022-09-12
Payer: MEDICAID

## 2022-09-12 VITALS — BODY MASS INDEX: 21.11 KG/M2 | WEIGHT: 160 LBS

## 2022-09-12 PROCEDURE — 93798 PHYS/QHP OP CAR RHAB W/ECG: CPT

## 2022-09-15 ENCOUNTER — HOSPITAL ENCOUNTER (OUTPATIENT)
Dept: CARDIAC REHAB | Age: 57
Discharge: HOME | End: 2022-09-15
Payer: MEDICAID

## 2022-09-15 VITALS — WEIGHT: 154.8 LBS | BODY MASS INDEX: 20.42 KG/M2

## 2022-09-15 PROCEDURE — 93798 PHYS/QHP OP CAR RHAB W/ECG: CPT

## 2022-09-19 ENCOUNTER — HOSPITAL ENCOUNTER (OUTPATIENT)
Dept: CARDIAC REHAB | Age: 57
Discharge: HOME | End: 2022-09-19
Payer: MEDICAID

## 2022-09-19 VITALS — WEIGHT: 161.6 LBS | BODY MASS INDEX: 21.32 KG/M2

## 2022-09-19 PROCEDURE — 93798 PHYS/QHP OP CAR RHAB W/ECG: CPT

## 2022-09-21 ENCOUNTER — HOSPITAL ENCOUNTER (OUTPATIENT)
Dept: CARDIAC REHAB | Age: 57
End: 2022-09-21
Payer: MEDICAID

## 2022-09-21 ENCOUNTER — HOSPITAL ENCOUNTER (OUTPATIENT)
Dept: CARDIAC REHAB | Age: 57
Discharge: HOME | End: 2022-09-21
Payer: MEDICAID

## 2022-09-21 VITALS — BODY MASS INDEX: 20.71 KG/M2 | WEIGHT: 157 LBS

## 2022-09-21 PROCEDURE — 93798 PHYS/QHP OP CAR RHAB W/ECG: CPT

## 2022-09-22 ENCOUNTER — APPOINTMENT (OUTPATIENT)
Dept: CARDIAC REHAB | Age: 57
End: 2022-09-22
Payer: MEDICAID

## 2022-09-26 ENCOUNTER — HOSPITAL ENCOUNTER (OUTPATIENT)
Dept: CARDIAC REHAB | Age: 57
Discharge: HOME | End: 2022-09-26
Payer: MEDICAID

## 2022-09-26 VITALS — WEIGHT: 156.6 LBS | BODY MASS INDEX: 20.66 KG/M2

## 2022-09-26 PROCEDURE — 93798 PHYS/QHP OP CAR RHAB W/ECG: CPT

## 2022-09-28 ENCOUNTER — HOSPITAL ENCOUNTER (OUTPATIENT)
Dept: CARDIAC REHAB | Age: 57
Discharge: HOME | End: 2022-09-28
Payer: MEDICAID

## 2022-09-28 VITALS — WEIGHT: 158.2 LBS | BODY MASS INDEX: 20.87 KG/M2

## 2022-09-28 PROCEDURE — 93797 PHYS/QHP OP CAR RHAB WO ECG: CPT

## 2022-09-28 PROCEDURE — 93798 PHYS/QHP OP CAR RHAB W/ECG: CPT

## 2022-09-29 ENCOUNTER — HOSPITAL ENCOUNTER (OUTPATIENT)
Dept: CARDIAC REHAB | Age: 57
Discharge: HOME | End: 2022-09-29
Payer: MEDICAID

## 2022-09-29 VITALS — WEIGHT: 160.8 LBS | BODY MASS INDEX: 21.22 KG/M2

## 2022-09-29 PROCEDURE — 93798 PHYS/QHP OP CAR RHAB W/ECG: CPT

## 2022-09-29 PROCEDURE — 93797 PHYS/QHP OP CAR RHAB WO ECG: CPT

## 2022-09-29 NOTE — CARDIO/PULMONARY
Cardiac Rehab Nutrition Assessment - 1:1 Evaluation         NAME: Vera Cotton : 1965 AGE: 60 y.o. GENDER: male  CARDIAC REHAB ADMITTING DIAGNOSIS: CHF    CKD VI, not on HD currently    PROBLEM LIST:  Patient Active Problem List   Diagnosis Code    Accelerated hypertension I10    Vasovagal near-syncope R55    Chest pain R07.9    CKD (chronic kidney disease) stage 4, GFR 15-29 ml/min ([facility]) N18.4    Hyperkalemia E87.5    Dyspnea R06.00    HTN (hypertension) I10    CKD (chronic kidney disease) stage 3, GFR 30-59 ml/min ([facility]) N18.30    DAVIAN (acute kidney injury) ([facility] N17.9    CHF (congestive heart failure) ([facility]) I50.9    Chest pain at rest R07.9    SOB (shortness of breath) R06.02    Hypertension in stage 5 chronic kidney disease due to type 2 diabetes mellitus ([facility]) E11.22, I12.0, N18.5       LABS:   Lab Results   Component Value Date/Time    Hemoglobin A1c 7.1 (H) 2022 10:30 AM     Lab Results   Component Value Date/Time    Cholesterol, total 130 2022 08:40 AM    HDL Cholesterol 49 2022 08:40 AM    LDL, calculated 67.2 2022 08:40 AM    VLDL, calculated 13.8 2022 08:40 AM    Triglyceride 69 2022 08:40 AM    CHOL/HDL Ratio 2.7 2022 08:40 AM       MEDICATIONS/SUPPLEMENTS:   Prior to Admission medications    Medication Sig Start Date End Date Taking? Authorizing Provider   furosemide (LASIX) 40 mg tablet Take 40 mg by mouth two (2) times a day. Provider, Historical   isosorbide mononitrate (ISMO, MONOKET) 10 mg tablet Take 10 mg by mouth three (3) times daily. Provider, Historical   amLODIPine (NORVASC) 10 mg tablet Take 1 Tablet by mouth in the morning. 22   Hui Campbell NP   carvediloL (COREG) 12.5 mg tablet Take 3 Tablets by mouth two (2) times daily (with meals). 22   Hui Campbell NP   hydrALAZINE (APRESOLINE) 100 mg tablet Take 1 Tablet by mouth three (3) times daily.  22   Panfilo Vines MD   amLODIPine (NORVASC) 10 mg tablet Take 10 mg by mouth daily. Provider, Historical   atorvastatin (LIPITOR) 40 mg tablet Take 40 mg by mouth daily. Provider, Historical   SITagliptin (JANUVIA) 50 mg tablet Take 100 mg by mouth in the morning. Provider, Historical   sodium bicarbonate 650 mg tablet Take 1,300 mg by mouth three (3) times daily. Provider, Historical   glipiZIDE (GLUCOTROL) 5 mg tablet Take 5 mg by mouth two (2) times a day. Provider, Historical         ANTHROPOMETRICS:    Ht Readings from Last 1 Encounters:   08/09/22 6' 1\" (1.854 m)      Wt Readings from Last 10 Encounters:   09/29/22 72.9 kg (160 lb 12.8 oz)   09/28/22 71.8 kg (158 lb 3.2 oz)   09/26/22 71 kg (156 lb 9.6 oz)   09/21/22 71.2 kg (157 lb)   09/19/22 73.3 kg (161 lb 9.6 oz)   09/15/22 70.2 kg (154 lb 12.8 oz)   09/12/22 72.6 kg (160 lb)   09/01/22 68.3 kg (150 lb 9.6 oz)   08/31/22 68.3 kg (150 lb 9.6 oz)   08/29/22 70.3 kg (155 lb)          BMI: 21.22 kg/M2 Category: normal  Waist:  inches    Reported Wt Hx: Lost 10# of fluid in the hospital, lost another 15# of fluid. Feels good at current weight. Reported Diet Hx: Tries to limit high sodium foods and reads labels. States 95% of foods he purchases have <15-20% of sodium. Pt required to be on diabetic, renal and HH diet so is struggling with choosing what to eat. Pt was not aware processed meats like deli turkey have high amount of sodium and stated there is nothing to eat in the grocery store that does not have high salt. Rate Your Plate Score:  (Score 58-72: Making many healthy choices; 41-57: Some choices need improving 24-40: many choices need improving)    24 HOUR DIET RECALL  Breakfast Cereal, oatmeal, boiled eggs    Lunch Deli turkey on bread or low sodium crackers   Dinner chicken   Snacks yogurt   Beverages SF koolaid and unsweet tea, water      Jt [place]     Environmental/Social: Pt prepares majority of his meals.          NUTRITION INTERVENTION:  Nutrition 60 minute one-on-one education & goal setting with [address] [place] [address] with Dina Hamm relevant labs compared to ideals. Reviewed weight history and patient's verbalized weight goal as well as any real or perceived barriers to obtaining the goal. Collaborated with patient to set a specific short and long term weight goal.     Reviewed Rate Your Plate and conducted a verbal diet recall. Assessed for environmental, financial, psychosocial, physical and comorbidities that may impact the food and eating patterns / behaviors of Shai Lorenzo with patient to set specific nutrient goals as well as specific food / behavior changes that will help patient meet the overall goal of following a heart healthy eating pattern (using guidelines as set forth by the American Heart Association and modeled after healthful eating patterns as recognized by the USDA Dietary Guidelines such as DASH, Mediterranean or plant-based). Briefly reviewed with Dina Hamm the nutrition information in the Cardiac Rehab patient education book and encouraged Dina Hamm to read thoroughly, ask questions as needed, and use for future reference for heart healthy nutrition information. Dina Hamm is scheduled to participate in Cardiac Rehab group nutrition classes. PATIENT GOALS:    Weight Goals: maint    Nutrition Goals:  Daily Recommendations:  Calories: 2100 /day  (using Staci Lusher with AF 1.3)    Saturated Fat: no more than 21 g/day  Trans Fat: 0 g/day  Sodium: no more than 1500 mg/day  Fruit: 2 cups / day  Vegetables: 2-3 cups/day    Other:  - read and compare food labels for sodium and limit to <5%/serving  - choose no salt added canned vegetables and add to dinner 3x/week      Keeping a food diary was recommended. Questions addressed. Follow-up plans discussed. Dina Hamm verbalized understanding.             Mary Wheatley RD

## 2022-10-03 ENCOUNTER — HOSPITAL ENCOUNTER (OUTPATIENT)
Dept: CARDIAC REHAB | Age: 57
Discharge: HOME | End: 2022-10-03
Payer: MEDICAID

## 2022-10-03 VITALS — BODY MASS INDEX: 20.53 KG/M2 | WEIGHT: 155.6 LBS

## 2022-10-03 PROCEDURE — 93798 PHYS/QHP OP CAR RHAB W/ECG: CPT

## 2022-10-05 ENCOUNTER — HOSPITAL ENCOUNTER (EMERGENCY)
Age: 57
Discharge: HOME | End: 2022-10-05
Attending: EMERGENCY MEDICINE | Admitting: EMERGENCY MEDICINE
Payer: MEDICAID

## 2022-10-05 ENCOUNTER — APPOINTMENT (OUTPATIENT)
Dept: GENERAL RADIOLOGY | Age: 57
End: 2022-10-05
Attending: EMERGENCY MEDICINE
Payer: MEDICAID

## 2022-10-05 ENCOUNTER — APPOINTMENT (OUTPATIENT)
Dept: CT IMAGING | Age: 57
End: 2022-10-05
Attending: EMERGENCY MEDICINE
Payer: MEDICAID

## 2022-10-05 VITALS
WEIGHT: 155 LBS | SYSTOLIC BLOOD PRESSURE: 182 MMHG | HEIGHT: 73 IN | TEMPERATURE: 97.5 F | RESPIRATION RATE: 18 BRPM | HEART RATE: 85 BPM | DIASTOLIC BLOOD PRESSURE: 98 MMHG | BODY MASS INDEX: 20.54 KG/M2 | OXYGEN SATURATION: 98 %
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
## 2022-10-05 DIAGNOSIS — S29.9XXA TRAUMATIC INJURY OF RIB: ICD-10-CM

## 2022-10-05 LAB
ALBUMIN SERPL-MCNC: 3.8 G/DL (ref 3.5–5)
ALBUMIN/GLOB SERPL: 0.8 {RATIO} (ref 1.1–2.2)
ALP SERPL-CCNC: 138 U/L (ref 45–117)
ALT SERPL-CCNC: 68 U/L (ref 12–78)
ANION GAP SERPL CALC-SCNC: 12 MMOL/L (ref 5–15)
AST SERPL W P-5'-P-CCNC: 26 U/L (ref 15–37)
BASOPHILS # BLD: 0 K/UL (ref 0–0.1)
BASOPHILS NFR BLD: 0 % (ref 0–1)
BILIRUB SERPL-MCNC: 0.3 MG/DL (ref 0.2–1)
BUN SERPL-MCNC: 67 MG/DL (ref 6–20)
BUN/CREAT SERPL: 15 (ref 12–20)
CA-I BLD-MCNC: 9.1 MG/DL (ref 8.5–10.1)
CHLORIDE SERPL-SCNC: 102 MMOL/L (ref 97–108)
CO2 SERPL-SCNC: 24 MMOL/L (ref 21–32)
CREAT SERPL-MCNC: 4.37 MG/DL (ref 0.7–1.3)
DIFFERENTIAL METHOD BLD: ABNORMAL
EOSINOPHIL # BLD: 0.2 K/UL (ref 0–0.4)
EOSINOPHIL NFR BLD: 3 % (ref 0–7)
ERYTHROCYTE [DISTWIDTH] IN BLOOD BY AUTOMATED COUNT: 14.4 % (ref 11.5–14.5)
GLOBULIN SER CALC-MCNC: 4.9 G/DL (ref 2–4)
GLUCOSE SERPL-MCNC: 274 MG/DL (ref 65–100)
HCT VFR BLD AUTO: 28.7 % (ref 36.6–50.3)
HGB BLD-MCNC: 9.6 G/DL (ref 12.1–17)
IMM GRANULOCYTES # BLD AUTO: 0 K/UL (ref 0–0.04)
IMM GRANULOCYTES NFR BLD AUTO: 0 % (ref 0–0.5)
LYMPHOCYTES # BLD: 1.2 K/UL (ref 0.8–3.5)
LYMPHOCYTES NFR BLD: 16 % (ref 12–49)
MCH RBC QN AUTO: 27.8 PG (ref 26–34)
MCHC RBC AUTO-ENTMCNC: 33.4 G/DL (ref 30–36.5)
MCV RBC AUTO: 83.2 FL (ref 80–99)
MONOCYTES # BLD: 0.5 K/UL (ref 0–1)
MONOCYTES NFR BLD: 7 % (ref 5–13)
NEUTS SEG # BLD: 5.4 K/UL (ref 1.8–8)
NEUTS SEG NFR BLD: 74 % (ref 32–75)
NRBC # BLD: 0 K/UL (ref 0–0.01)
NRBC BLD-RTO: 0 PER 100 WBC
PLATELET # BLD AUTO: 145 K/UL (ref 150–400)
PMV BLD AUTO: 9.9 FL (ref 8.9–12.9)
POTASSIUM SERPL-SCNC: 4.9 MMOL/L (ref 3.5–5.1)
PROT SERPL-MCNC: 8.7 G/DL (ref 6.4–8.2)
RBC # BLD AUTO: 3.45 M/UL (ref 4.1–5.7)
SODIUM SERPL-SCNC: 138 MMOL/L (ref 136–145)
WBC # BLD AUTO: 7.4 K/UL (ref 4.1–11.1)

## 2022-10-05 PROCEDURE — 36415 COLL VENOUS BLD VENIPUNCTURE: CPT

## 2022-10-05 PROCEDURE — 74176 CT ABD & PELVIS W/O CONTRAST: CPT

## 2022-10-05 PROCEDURE — 99284 EMERGENCY DEPT VISIT MOD MDM: CPT

## 2022-10-05 PROCEDURE — 85025 COMPLETE CBC W/AUTO DIFF WBC: CPT

## 2022-10-05 PROCEDURE — 71101 X-RAY EXAM UNILAT RIBS/CHEST: CPT

## 2022-10-05 PROCEDURE — 80053 COMPREHEN METABOLIC PANEL: CPT

## 2022-10-05 PROCEDURE — 71250 CT THORAX DX C-: CPT

## 2022-10-05 PROCEDURE — 74011250637 HC RX REV CODE- 250/637: Performed by: EMERGENCY MEDICINE

## 2022-10-05 RX ORDER — ACETAMINOPHEN 500 MG
500 TABLET ORAL ONCE
Status: COMPLETED | OUTPATIENT
Start: 2022-10-05 | End: 2022-10-05

## 2022-10-05 RX ORDER — ACETAMINOPHEN 325 MG/1
650 TABLET ORAL ONCE
Status: DISCONTINUED | OUTPATIENT
Start: 2022-10-05 | End: 2022-10-05

## 2022-10-05 RX ORDER — HYDROCODONE BITARTRATE AND ACETAMINOPHEN 5; 325 MG/1; MG/1
1 TABLET ORAL
Qty: 7 TABLET | Refills: 0 | Status: SHIPPED | OUTPATIENT
Start: 2022-10-05 | End: 2022-10-08

## 2022-10-05 RX ORDER — ACETAMINOPHEN 500 MG
1000 TABLET ORAL ONCE
Status: DISCONTINUED | OUTPATIENT
Start: 2022-10-05 | End: 2022-10-05

## 2022-10-05 RX ORDER — HYDROCODONE BITARTRATE AND ACETAMINOPHEN 5; 325 MG/1; MG/1
1 TABLET ORAL ONCE
Status: COMPLETED | OUTPATIENT
Start: 2022-10-05 | End: 2022-10-05

## 2022-10-05 RX ADMIN — ACETAMINOPHEN 500 MG: 500 TABLET ORAL at 16:50

## 2022-10-05 RX ADMIN — HYDROCODONE BITARTRATE AND ACETAMINOPHEN 1 TABLET: 5; 325 TABLET ORAL at 16:50
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
## 2022-10-10 ENCOUNTER — APPOINTMENT (OUTPATIENT)
Dept: CARDIAC REHAB | Age: 57
End: 2022-10-10
Payer: MEDICAID

## 2022-10-12 ENCOUNTER — HOSPITAL ENCOUNTER (OUTPATIENT)
Dept: CARDIAC REHAB | Age: 57
Discharge: HOME | End: 2022-10-12
Payer: MEDICAID

## 2022-10-12 VITALS — BODY MASS INDEX: 20.58 KG/M2 | WEIGHT: 156 LBS

## 2022-10-12 PROCEDURE — 93798 PHYS/QHP OP CAR RHAB W/ECG: CPT

## 2022-10-12 PROCEDURE — 93797 PHYS/QHP OP CAR RHAB WO ECG: CPT

## 2022-10-13 ENCOUNTER — HOSPITAL ENCOUNTER (OUTPATIENT)
Dept: CARDIAC REHAB | Age: 57
Discharge: HOME | End: 2022-10-13
Payer: MEDICAID

## 2022-10-13 VITALS — BODY MASS INDEX: 21.24 KG/M2 | WEIGHT: 161 LBS

## 2022-10-13 PROCEDURE — 93798 PHYS/QHP OP CAR RHAB W/ECG: CPT

## 2022-10-24 ENCOUNTER — HOSPITAL ENCOUNTER (OUTPATIENT)
Dept: CARDIAC REHAB | Age: 57
Discharge: HOME | End: 2022-10-24
Payer: MEDICAID

## 2022-10-24 VITALS — WEIGHT: 157.6 LBS | BODY MASS INDEX: 20.79 KG/M2

## 2022-10-24 PROCEDURE — 93798 PHYS/QHP OP CAR RHAB W/ECG: CPT

## 2022-10-26 ENCOUNTER — HOSPITAL ENCOUNTER (OUTPATIENT)
Dept: CARDIAC REHAB | Age: 57
Discharge: HOME | End: 2022-10-26
Payer: MEDICAID

## 2022-10-26 VITALS — BODY MASS INDEX: 20.61 KG/M2 | WEIGHT: 156.2 LBS

## 2022-10-26 PROCEDURE — 93797 PHYS/QHP OP CAR RHAB WO ECG: CPT

## 2022-10-26 PROCEDURE — 93798 PHYS/QHP OP CAR RHAB W/ECG: CPT

## 2022-10-27 ENCOUNTER — HOSPITAL ENCOUNTER (OUTPATIENT)
Dept: CARDIAC REHAB | Age: 57
Discharge: HOME | End: 2022-10-27
Payer: MEDICAID

## 2022-10-27 VITALS — WEIGHT: 156.2 LBS | BODY MASS INDEX: 20.61 KG/M2

## 2022-10-27 PROCEDURE — 93798 PHYS/QHP OP CAR RHAB W/ECG: CPT

## 2022-10-31 ENCOUNTER — HOSPITAL ENCOUNTER (OUTPATIENT)
Dept: CARDIAC REHAB | Age: 57
Discharge: HOME | End: 2022-10-31
Payer: MEDICAID

## 2022-10-31 VITALS — BODY MASS INDEX: 21.48 KG/M2 | WEIGHT: 162.8 LBS

## 2022-10-31 PROCEDURE — 93798 PHYS/QHP OP CAR RHAB W/ECG: CPT

## 2022-11-02 ENCOUNTER — HOSPITAL ENCOUNTER (OUTPATIENT)
Dept: CARDIAC REHAB | Age: 57
Discharge: HOME | End: 2022-11-02
Payer: MEDICAID

## 2022-11-02 ENCOUNTER — HOSPITAL ENCOUNTER (OUTPATIENT)
Dept: CARDIAC REHAB | Age: 57
End: 2022-11-02
Payer: MEDICAID

## 2022-11-02 VITALS — BODY MASS INDEX: 21.32 KG/M2 | WEIGHT: 161.6 LBS

## 2022-11-02 PROCEDURE — 93798 PHYS/QHP OP CAR RHAB W/ECG: CPT

## 2022-11-03 ENCOUNTER — HOSPITAL ENCOUNTER (OUTPATIENT)
Dept: CARDIAC REHAB | Age: 57
Discharge: HOME | End: 2022-11-03
Payer: MEDICAID

## 2022-11-03 VITALS — WEIGHT: 160.8 LBS | BODY MASS INDEX: 21.22 KG/M2

## 2022-11-03 PROCEDURE — 93798 PHYS/QHP OP CAR RHAB W/ECG: CPT

## 2022-11-03 RX ORDER — ISOSORBIDE DINITRATE 10 MG/1
10 TABLET ORAL 3 TIMES DAILY
COMMUNITY

## 2022-11-03 NOTE — CARDIO/PULMONARY
Abhishek Shah  Completed phase II cardiac rehab and attended 34 sessions from 7/26/22. Abhishek Shah is interested in maintaining optimal health and will work with Dr. French Mccormick MD and JHOANA Davis NP. Abhishek Shah has  improved his endurance and stamina through regular exercise, lost 0.5 inches from his  waist. Blood pressure is 159/64 and is not WNL but is improving. Medication changes were made today. He has also improved his nutrition, and [employer] scores and these were reviewed with patient. Abhishek Shah plans to continue exercising at home by riding bicycle, lifting weights and doing abd exercises and has set revised goals that include joining gym.  Cesario Rebollar RN  11/3/2022

## 2022-11-07 ENCOUNTER — APPOINTMENT (OUTPATIENT)
Dept: CARDIAC REHAB | Age: 57
End: 2022-11-07
Payer: MEDICAID

## 2022-11-09 ENCOUNTER — APPOINTMENT (OUTPATIENT)
Dept: CARDIAC REHAB | Age: 57
End: 2022-11-09
Payer: MEDICAID

## 2024-01-09 ENCOUNTER — HOSPITAL ENCOUNTER (OUTPATIENT)
Facility: HOSPITAL | Age: 59
Setting detail: OBSERVATION
Discharge: HOME | End: 2024-01-11
Attending: EMERGENCY MEDICINE | Admitting: HOSPITALIST
Payer: MEDICAID

## 2024-01-09 ENCOUNTER — APPOINTMENT (OUTPATIENT)
Facility: HOSPITAL | Age: 59
End: 2024-01-09
Attending: HOSPITALIST
Payer: MEDICAID

## 2024-01-09 ENCOUNTER — APPOINTMENT (OUTPATIENT)
Facility: HOSPITAL | Age: 59
End: 2024-01-09
Attending: EMERGENCY MEDICINE
Payer: MEDICAID

## 2024-01-09 ENCOUNTER — APPOINTMENT (OUTPATIENT)
Facility: HOSPITAL | Age: 59
End: 2024-01-09
Payer: MEDICAID

## 2024-01-09 DIAGNOSIS — R07.9 CHEST PAIN AT REST: ICD-10-CM

## 2024-01-09 DIAGNOSIS — R07.9 CHEST PAIN, UNSPECIFIED TYPE: Primary | ICD-10-CM

## 2024-01-09 LAB
ANION GAP SERPL CALC-SCNC: 12 MMOL/L (ref 5–15)
BASOPHILS # BLD: 0 K/UL (ref 0–0.1)
BASOPHILS NFR BLD: 0 % (ref 0–1)
BNP SERPL-MCNC: 3861 PG/ML
BUN SERPL-MCNC: 53 MG/DL (ref 6–20)
BUN/CREAT SERPL: 11 (ref 12–20)
CA-I BLD-MCNC: 8.6 MG/DL (ref 8.5–10.1)
CHLORIDE SERPL-SCNC: 109 MMOL/L (ref 97–108)
CHOLEST SERPL-MCNC: 134 MG/DL
CO2 SERPL-SCNC: 22 MMOL/L (ref 21–32)
CREAT SERPL-MCNC: 4.86 MG/DL (ref 0.7–1.3)
DIFFERENTIAL METHOD BLD: ABNORMAL
ECHO AO ROOT DIAM: 3.2 CM
ECHO AO ROOT INDEX: 1.58 CM/M2
ECHO AV AREA PEAK VELOCITY: 1.7 CM2
ECHO AV AREA VTI: 1.9 CM2
ECHO AV AREA/BSA PEAK VELOCITY: 0.8 CM2/M2
ECHO AV AREA/BSA VTI: 0.9 CM2/M2
ECHO AV MEAN GRADIENT: 4 MMHG
ECHO AV MEAN VELOCITY: 1 M/S
ECHO AV PEAK GRADIENT: 8 MMHG
ECHO AV PEAK VELOCITY: 1.5 M/S
ECHO AV VELOCITY RATIO: 0.67
ECHO AV VTI: 31.1 CM
ECHO BSA: 2 M2
ECHO BSA: 2 M2
ECHO EST RA PRESSURE: 3 MMHG
ECHO LA DIAMETER INDEX: 2.08 CM/M2
ECHO LA DIAMETER: 4.2 CM
ECHO LA TO AORTIC ROOT RATIO: 1.31
ECHO LA VOL A-L A2C: 66 ML (ref 18–58)
ECHO LA VOL MOD A2C: 65 ML (ref 18–58)
ECHO LA VOLUME INDEX A-L A2C: 33 ML/M2 (ref 16–34)
ECHO LA VOLUME INDEX MOD A2C: 32 ML/M2 (ref 16–34)
ECHO LV E' LATERAL VELOCITY: 9 CM/S
ECHO LV E' SEPTAL VELOCITY: 7 CM/S
ECHO LV EDV A2C: 77 ML
ECHO LV EDV A4C: 79 ML
ECHO LV EDV BP: 73 ML (ref 67–155)
ECHO LV EDV INDEX A4C: 39 ML/M2
ECHO LV EDV INDEX BP: 36 ML/M2
ECHO LV EDV NDEX A2C: 38 ML/M2
ECHO LV EJECTION FRACTION A2C: 66 %
ECHO LV EJECTION FRACTION A4C: 58 %
ECHO LV EJECTION FRACTION BIPLANE: 59 % (ref 55–100)
ECHO LV ESV A2C: 27 ML
ECHO LV ESV A4C: 33 ML
ECHO LV ESV BP: 30 ML (ref 22–58)
ECHO LV ESV INDEX A2C: 13 ML/M2
ECHO LV ESV INDEX A4C: 16 ML/M2
ECHO LV ESV INDEX BP: 15 ML/M2
ECHO LV GLOBAL LONGITUDINAL STRAIN (GLS): -14.4 %
ECHO LV GLOBAL LONGITUDINAL STRAIN (GLS): -16 %
ECHO LV GLOBAL LONGITUDINAL STRAIN (GLS): -16.5 %
ECHO LV GLOBAL LONGITUDINAL STRAIN (GLS): -19 %
ECHO LV INTERNAL DIMENSION DIASTOLIC MMODE: 4.6 CM (ref 4.2–5.9)
ECHO LV INTERNAL DIMENSION SYSTOLIC MMODE: 3.3 CM
ECHO LV IVSD MMODE: 1.6 CM (ref 0.6–1)
ECHO LV IVSS MMODE: 1.9 CM
ECHO LV POSTERIOR WALL DIASTOLIC MMODE: 1.5 CM (ref 0.6–1)
ECHO LV POSTERIOR WALL SYSTOLIC MMODE: 2.2 CM
ECHO LVOT AREA: 2.5 CM2
ECHO LVOT AV VTI INDEX: 0.76
ECHO LVOT DIAM: 1.8 CM
ECHO LVOT MEAN GRADIENT: 2 MMHG
ECHO LVOT PEAK GRADIENT: 4 MMHG
ECHO LVOT PEAK VELOCITY: 1 M/S
ECHO LVOT STROKE VOLUME INDEX: 29.7 ML/M2
ECHO LVOT SV: 60 ML
ECHO LVOT VTI: 23.6 CM
ECHO MV A VELOCITY: 1.01 M/S
ECHO MV E DECELERATION TIME (DT): 152.8 MS
ECHO MV E VELOCITY: 1.17 M/S
ECHO MV E/A RATIO: 1.16
ECHO MV E/E' LATERAL: 13
ECHO MV E/E' RATIO (AVERAGED): 14.86
ECHO RA AREA 4C: 15.1 CM2
ECHO RIGHT VENTRICULAR SYSTOLIC PRESSURE (RVSP): 46 MMHG
ECHO RV INTERNAL DIMENSION: 3.2 CM
ECHO RV TAPSE: 2.9 CM (ref 1.7–?)
ECHO TV REGURGITANT MAX VELOCITY: 3.27 M/S
ECHO TV REGURGITANT PEAK GRADIENT: 43 MMHG
EOSINOPHIL # BLD: 0.3 K/UL (ref 0–0.4)
EOSINOPHIL NFR BLD: 5 % (ref 0–7)
ERYTHROCYTE [DISTWIDTH] IN BLOOD BY AUTOMATED COUNT: 15.1 % (ref 11.5–14.5)
FLUAV RNA SPEC QL NAA+PROBE: NOT DETECTED
FLUBV RNA SPEC QL NAA+PROBE: NOT DETECTED
FOLATE SERPL-MCNC: 16.3 NG/ML (ref 5–21)
GLUCOSE BLD STRIP.AUTO-MCNC: 117 MG/DL (ref 65–100)
GLUCOSE BLD STRIP.AUTO-MCNC: 81 MG/DL (ref 65–100)
GLUCOSE SERPL-MCNC: 88 MG/DL (ref 65–100)
HCT VFR BLD AUTO: 23.8 % (ref 36.6–50.3)
HDLC SERPL-MCNC: 42 MG/DL
HDLC SERPL: 3.2 (ref 0–5)
HGB BLD-MCNC: 8.1 G/DL (ref 12.1–17)
IMM GRANULOCYTES # BLD AUTO: 0 K/UL (ref 0–0.04)
IMM GRANULOCYTES NFR BLD AUTO: 0 % (ref 0–0.5)
IRON SATN MFR SERPL: 18 % (ref 20–50)
IRON SERPL-MCNC: 46 UG/DL (ref 35–150)
LDLC SERPL CALC-MCNC: 69 MG/DL (ref 0–100)
LIPID PANEL: NORMAL
LYMPHOCYTES # BLD: 1.5 K/UL (ref 0.8–3.5)
LYMPHOCYTES NFR BLD: 23 % (ref 12–49)
MAGNESIUM SERPL-MCNC: 1.9 MG/DL (ref 1.6–2.4)
MCH RBC QN AUTO: 29.5 PG (ref 26–34)
MCHC RBC AUTO-ENTMCNC: 34 G/DL (ref 30–36.5)
MCV RBC AUTO: 86.5 FL (ref 80–99)
MONOCYTES # BLD: 0.8 K/UL (ref 0–1)
MONOCYTES NFR BLD: 13 % (ref 5–13)
NEUTS SEG # BLD: 3.7 K/UL (ref 1.8–8)
NEUTS SEG NFR BLD: 59 % (ref 32–75)
NRBC # BLD: 0 K/UL (ref 0–0.01)
NRBC BLD-RTO: 0 PER 100 WBC
PERFORMED BY:: ABNORMAL
PERFORMED BY:: NORMAL
PLATELET # BLD AUTO: 134 K/UL (ref 150–400)
PMV BLD AUTO: 11 FL (ref 8.9–12.9)
POTASSIUM SERPL-SCNC: 5.4 MMOL/L (ref 3.5–5.1)
RBC # BLD AUTO: 2.75 M/UL (ref 4.1–5.7)
SARS-COV-2 RNA RESP QL NAA+PROBE: NOT DETECTED
SODIUM SERPL-SCNC: 143 MMOL/L (ref 136–145)
TIBC SERPL-MCNC: 254 UG/DL (ref 250–450)
TRIGL SERPL-MCNC: 115 MG/DL
TROPONIN I SERPL HS-MCNC: 49 NG/L (ref 0–76)
TROPONIN I SERPL HS-MCNC: 50 NG/L (ref 0–76)
TROPONIN I SERPL HS-MCNC: 52 NG/L (ref 0–76)
TROPONIN I SERPL HS-MCNC: 52 NG/L (ref 0–76)
TSH SERPL DL<=0.05 MIU/L-ACNC: 3.24 UIU/ML (ref 0.36–3.74)
VAS LEFT CCA DIST EDV: 12.2 CM/S
VAS LEFT CCA DIST PSV: 101.4 CM/S
VAS LEFT CCA PROX EDV: 17.1 CM/S
VAS LEFT CCA PROX PSV: 86.5 CM/S
VAS LEFT ECA EDV: 6 CM/S
VAS LEFT ECA PSV: 73.3 CM/S
VAS LEFT ICA DIST EDV: 21.4 CM/S
VAS LEFT ICA DIST PSV: 55.4 CM/S
VAS LEFT ICA MID EDV: 18.5 CM/S
VAS LEFT ICA MID PSV: 57.6 CM/S
VAS LEFT ICA PROX EDV: 12.1 CM/S
VAS LEFT ICA PROX PSV: 81.1 CM/S
VAS LEFT ICA/CCA PSV: 0.8 NO UNITS
VAS LEFT SUBCLAVIAN PROX EDV: 6.4 CM/S
VAS LEFT SUBCLAVIAN PROX PSV: 118.5 CM/S
VAS LEFT VERTEBRAL EDV: 34.33 CM/S
VAS LEFT VERTEBRAL PSV: 123.6 CM/S
VAS RIGHT CCA DIST EDV: 16 CM/S
VAS RIGHT CCA DIST PSV: 83.6 CM/S
VAS RIGHT CCA PROX EDV: 17.3 CM/S
VAS RIGHT CCA PROX PSV: 104.4 CM/S
VAS RIGHT ECA EDV: 5.89 CM/S
VAS RIGHT ECA PSV: 111.8 CM/S
VAS RIGHT ICA DIST EDV: 20.4 CM/S
VAS RIGHT ICA DIST PSV: 62.5 CM/S
VAS RIGHT ICA MID EDV: 19.6 CM/S
VAS RIGHT ICA MID PSV: 63.2 CM/S
VAS RIGHT ICA PROX EDV: 12.3 CM/S
VAS RIGHT ICA PROX PSV: 73.6 CM/S
VAS RIGHT ICA/CCA PSV: 0.9 NO UNITS
VAS RIGHT SUBCLAVIAN PROX EDV: 8.6 CM/S
VAS RIGHT SUBCLAVIAN PROX PSV: 112.8 CM/S
VAS RIGHT VERTEBRAL EDV: 20.43 CM/S
VAS RIGHT VERTEBRAL PSV: 65.2 CM/S
VIT B12 SERPL-MCNC: 623 PG/ML (ref 193–986)
VLDLC SERPL CALC-MCNC: 23 MG/DL
WBC # BLD AUTO: 6.4 K/UL (ref 4.1–11.1)

## 2024-01-09 PROCEDURE — 82746 ASSAY OF FOLIC ACID SERUM: CPT

## 2024-01-09 PROCEDURE — 80061 LIPID PANEL: CPT

## 2024-01-09 PROCEDURE — 84484 ASSAY OF TROPONIN QUANT: CPT

## 2024-01-09 PROCEDURE — G0378 HOSPITAL OBSERVATION PER HR: HCPCS

## 2024-01-09 PROCEDURE — 93880 EXTRACRANIAL BILAT STUDY: CPT

## 2024-01-09 PROCEDURE — 84443 ASSAY THYROID STIM HORMONE: CPT

## 2024-01-09 PROCEDURE — 94761 N-INVAS EAR/PLS OXIMETRY MLT: CPT

## 2024-01-09 PROCEDURE — 85025 COMPLETE CBC W/AUTO DIFF WBC: CPT

## 2024-01-09 PROCEDURE — 6370000000 HC RX 637 (ALT 250 FOR IP): Performed by: HOSPITALIST

## 2024-01-09 PROCEDURE — 71046 X-RAY EXAM CHEST 2 VIEWS: CPT

## 2024-01-09 PROCEDURE — 83540 ASSAY OF IRON: CPT

## 2024-01-09 PROCEDURE — 6360000002 HC RX W HCPCS: Performed by: HOSPITALIST

## 2024-01-09 PROCEDURE — 83036 HEMOGLOBIN GLYCOSYLATED A1C: CPT

## 2024-01-09 PROCEDURE — 83880 ASSAY OF NATRIURETIC PEPTIDE: CPT

## 2024-01-09 PROCEDURE — 93005 ELECTROCARDIOGRAM TRACING: CPT | Performed by: EMERGENCY MEDICINE

## 2024-01-09 PROCEDURE — 6370000000 HC RX 637 (ALT 250 FOR IP): Performed by: EMERGENCY MEDICINE

## 2024-01-09 PROCEDURE — 82607 VITAMIN B-12: CPT

## 2024-01-09 PROCEDURE — 2500000003 HC RX 250 WO HCPCS: Performed by: EMERGENCY MEDICINE

## 2024-01-09 PROCEDURE — 96375 TX/PRO/DX INJ NEW DRUG ADDON: CPT

## 2024-01-09 PROCEDURE — 2580000003 HC RX 258: Performed by: HOSPITALIST

## 2024-01-09 PROCEDURE — 83735 ASSAY OF MAGNESIUM: CPT

## 2024-01-09 PROCEDURE — 82962 GLUCOSE BLOOD TEST: CPT

## 2024-01-09 PROCEDURE — 70551 MRI BRAIN STEM W/O DYE: CPT

## 2024-01-09 PROCEDURE — 99285 EMERGENCY DEPT VISIT HI MDM: CPT

## 2024-01-09 PROCEDURE — 87636 SARSCOV2 & INF A&B AMP PRB: CPT

## 2024-01-09 PROCEDURE — 80048 BASIC METABOLIC PNL TOTAL CA: CPT

## 2024-01-09 PROCEDURE — 93306 TTE W/DOPPLER COMPLETE: CPT

## 2024-01-09 PROCEDURE — 96365 THER/PROPH/DIAG IV INF INIT: CPT

## 2024-01-09 PROCEDURE — 36415 COLL VENOUS BLD VENIPUNCTURE: CPT

## 2024-01-09 PROCEDURE — 6360000002 HC RX W HCPCS: Performed by: EMERGENCY MEDICINE

## 2024-01-09 RX ORDER — CARVEDILOL 12.5 MG/1
37.5 TABLET ORAL 2 TIMES DAILY WITH MEALS
Status: DISCONTINUED | OUTPATIENT
Start: 2024-01-09 | End: 2024-01-10

## 2024-01-09 RX ORDER — MAGNESIUM HYDROXIDE/ALUMINUM HYDROXICE/SIMETHICONE 120; 1200; 1200 MG/30ML; MG/30ML; MG/30ML
30 SUSPENSION ORAL
Status: COMPLETED | OUTPATIENT
Start: 2024-01-09 | End: 2024-01-09

## 2024-01-09 RX ORDER — HYDRALAZINE HYDROCHLORIDE 50 MG/1
50 TABLET, FILM COATED ORAL 3 TIMES DAILY
Status: DISCONTINUED | OUTPATIENT
Start: 2024-01-09 | End: 2024-01-09

## 2024-01-09 RX ORDER — CALCIUM GLUCONATE 10 MG/ML
1000 INJECTION, SOLUTION INTRAVENOUS ONCE
Status: COMPLETED | OUTPATIENT
Start: 2024-01-09 | End: 2024-01-09

## 2024-01-09 RX ORDER — ASPIRIN 325 MG
325 TABLET ORAL
Status: COMPLETED | OUTPATIENT
Start: 2024-01-09 | End: 2024-01-09

## 2024-01-09 RX ORDER — CALCITRIOL 0.5 UG/1
0.5 CAPSULE, LIQUID FILLED ORAL DAILY
COMMUNITY

## 2024-01-09 RX ORDER — DEXTROSE MONOHYDRATE 100 MG/ML
INJECTION, SOLUTION INTRAVENOUS CONTINUOUS PRN
Status: DISCONTINUED | OUTPATIENT
Start: 2024-01-09 | End: 2024-01-11 | Stop reason: HOSPADM

## 2024-01-09 RX ORDER — AMLODIPINE BESYLATE 10 MG/1
10 TABLET ORAL DAILY
Status: DISCONTINUED | OUTPATIENT
Start: 2024-01-10 | End: 2024-01-10

## 2024-01-09 RX ORDER — INSULIN LISPRO 100 [IU]/ML
0-4 INJECTION, SOLUTION INTRAVENOUS; SUBCUTANEOUS NIGHTLY
Status: DISCONTINUED | OUTPATIENT
Start: 2024-01-09 | End: 2024-01-09

## 2024-01-09 RX ORDER — ONDANSETRON 4 MG/1
4 TABLET, ORALLY DISINTEGRATING ORAL EVERY 8 HOURS PRN
Status: DISCONTINUED | OUTPATIENT
Start: 2024-01-09 | End: 2024-01-11 | Stop reason: HOSPADM

## 2024-01-09 RX ORDER — SODIUM BICARBONATE 650 MG/1
1300 TABLET ORAL
Status: DISCONTINUED | OUTPATIENT
Start: 2024-01-09 | End: 2024-01-11 | Stop reason: HOSPADM

## 2024-01-09 RX ORDER — SODIUM CHLORIDE 9 MG/ML
INJECTION, SOLUTION INTRAVENOUS PRN
Status: DISCONTINUED | OUTPATIENT
Start: 2024-01-09 | End: 2024-01-11 | Stop reason: HOSPADM

## 2024-01-09 RX ORDER — ACETAMINOPHEN 325 MG/1
650 TABLET ORAL EVERY 6 HOURS PRN
Status: DISCONTINUED | OUTPATIENT
Start: 2024-01-09 | End: 2024-01-11 | Stop reason: HOSPADM

## 2024-01-09 RX ORDER — INSULIN LISPRO 100 [IU]/ML
0-8 INJECTION, SOLUTION INTRAVENOUS; SUBCUTANEOUS
Status: DISCONTINUED | OUTPATIENT
Start: 2024-01-09 | End: 2024-01-11 | Stop reason: HOSPADM

## 2024-01-09 RX ORDER — HYDRALAZINE HYDROCHLORIDE 20 MG/ML
10 INJECTION INTRAMUSCULAR; INTRAVENOUS EVERY 6 HOURS PRN
Status: DISCONTINUED | OUTPATIENT
Start: 2024-01-09 | End: 2024-01-11 | Stop reason: HOSPADM

## 2024-01-09 RX ORDER — HEPARIN SODIUM 5000 [USP'U]/ML
5000 INJECTION, SOLUTION INTRAVENOUS; SUBCUTANEOUS EVERY 8 HOURS SCHEDULED
Status: DISCONTINUED | OUTPATIENT
Start: 2024-01-09 | End: 2024-01-11 | Stop reason: HOSPADM

## 2024-01-09 RX ORDER — LIDOCAINE 50 MG/G
OINTMENT TOPICAL PRN
Status: DISCONTINUED | OUTPATIENT
Start: 2024-01-09 | End: 2024-01-11 | Stop reason: HOSPADM

## 2024-01-09 RX ORDER — ATORVASTATIN CALCIUM 40 MG/1
40 TABLET, FILM COATED ORAL DAILY
Status: DISCONTINUED | OUTPATIENT
Start: 2024-01-10 | End: 2024-01-11 | Stop reason: HOSPADM

## 2024-01-09 RX ORDER — FUROSEMIDE 10 MG/ML
40 INJECTION INTRAMUSCULAR; INTRAVENOUS ONCE
Status: COMPLETED | OUTPATIENT
Start: 2024-01-09 | End: 2024-01-09

## 2024-01-09 RX ORDER — INSULIN LISPRO 100 [IU]/ML
0-8 INJECTION, SOLUTION INTRAVENOUS; SUBCUTANEOUS
Status: DISCONTINUED | OUTPATIENT
Start: 2024-01-09 | End: 2024-01-09

## 2024-01-09 RX ORDER — SODIUM CHLORIDE 0.9 % (FLUSH) 0.9 %
5-40 SYRINGE (ML) INJECTION EVERY 12 HOURS SCHEDULED
Status: DISCONTINUED | OUTPATIENT
Start: 2024-01-09 | End: 2024-01-11 | Stop reason: HOSPADM

## 2024-01-09 RX ORDER — ASPIRIN 81 MG/1
81 TABLET ORAL DAILY
Status: DISCONTINUED | OUTPATIENT
Start: 2024-01-10 | End: 2024-01-11 | Stop reason: HOSPADM

## 2024-01-09 RX ORDER — LIDOCAINE HYDROCHLORIDE 20 MG/ML
15 SOLUTION OROPHARYNGEAL
Status: COMPLETED | OUTPATIENT
Start: 2024-01-09 | End: 2024-01-09

## 2024-01-09 RX ORDER — HYDRALAZINE HYDROCHLORIDE 100 MG/1
100 TABLET, FILM COATED ORAL 3 TIMES DAILY
Status: DISCONTINUED | OUTPATIENT
Start: 2024-01-10 | End: 2024-01-11 | Stop reason: HOSPADM

## 2024-01-09 RX ORDER — ONDANSETRON 2 MG/ML
4 INJECTION INTRAMUSCULAR; INTRAVENOUS EVERY 6 HOURS PRN
Status: DISCONTINUED | OUTPATIENT
Start: 2024-01-09 | End: 2024-01-11 | Stop reason: HOSPADM

## 2024-01-09 RX ORDER — POLYETHYLENE GLYCOL 3350 17 G/17G
17 POWDER, FOR SOLUTION ORAL DAILY PRN
Status: DISCONTINUED | OUTPATIENT
Start: 2024-01-09 | End: 2024-01-11 | Stop reason: HOSPADM

## 2024-01-09 RX ORDER — ISOSORBIDE DINITRATE 20 MG/1
20 TABLET ORAL 3 TIMES DAILY
Status: DISCONTINUED | OUTPATIENT
Start: 2024-01-09 | End: 2024-01-11 | Stop reason: HOSPADM

## 2024-01-09 RX ORDER — MAGNESIUM HYDROXIDE/ALUMINUM HYDROXICE/SIMETHICONE 120; 1200; 1200 MG/30ML; MG/30ML; MG/30ML
30 SUSPENSION ORAL
Status: COMPLETED | OUTPATIENT
Start: 2024-01-09 | End: 2024-01-10

## 2024-01-09 RX ORDER — MORPHINE SULFATE 2 MG/ML
2 INJECTION, SOLUTION INTRAMUSCULAR; INTRAVENOUS
Status: COMPLETED | OUTPATIENT
Start: 2024-01-09 | End: 2024-01-09

## 2024-01-09 RX ORDER — SODIUM POLYSTYRENE SULFONATE 15 G/60ML
15 SUSPENSION ORAL; RECTAL ONCE
Status: COMPLETED | OUTPATIENT
Start: 2024-01-09 | End: 2024-01-09

## 2024-01-09 RX ORDER — GLIPIZIDE 5 MG/1
5 TABLET ORAL 2 TIMES DAILY
Status: DISCONTINUED | OUTPATIENT
Start: 2024-01-09 | End: 2024-01-11 | Stop reason: HOSPADM

## 2024-01-09 RX ORDER — SODIUM CHLORIDE 0.9 % (FLUSH) 0.9 %
5-40 SYRINGE (ML) INJECTION PRN
Status: DISCONTINUED | OUTPATIENT
Start: 2024-01-09 | End: 2024-01-11 | Stop reason: HOSPADM

## 2024-01-09 RX ORDER — ACETAMINOPHEN 650 MG/1
650 SUPPOSITORY RECTAL EVERY 6 HOURS PRN
Status: DISCONTINUED | OUTPATIENT
Start: 2024-01-09 | End: 2024-01-11 | Stop reason: HOSPADM

## 2024-01-09 RX ADMIN — HEPARIN SODIUM 5000 UNITS: 5000 INJECTION INTRAVENOUS; SUBCUTANEOUS at 21:57

## 2024-01-09 RX ADMIN — HYDRALAZINE HYDROCHLORIDE 50 MG: 50 TABLET, FILM COATED ORAL at 16:04

## 2024-01-09 RX ADMIN — HYDRALAZINE HYDROCHLORIDE 50 MG: 50 TABLET, FILM COATED ORAL at 22:58

## 2024-01-09 RX ADMIN — GLIPIZIDE 5 MG: 5 TABLET ORAL at 21:56

## 2024-01-09 RX ADMIN — CARVEDILOL 37.5 MG: 12.5 TABLET, FILM COATED ORAL at 16:04

## 2024-01-09 RX ADMIN — SODIUM BICARBONATE 1300 MG: 650 TABLET ORAL at 16:04

## 2024-01-09 RX ADMIN — LIDOCAINE HYDROCHLORIDE 15 ML: 20 SOLUTION ORAL at 10:46

## 2024-01-09 RX ADMIN — ALUMINUM HYDROXIDE, MAGNESIUM HYDROXIDE, AND SIMETHICONE 30 ML: 200; 200; 20 SUSPENSION ORAL at 16:03

## 2024-01-09 RX ADMIN — FUROSEMIDE 40 MG: 10 INJECTION, SOLUTION INTRAMUSCULAR; INTRAVENOUS at 22:28

## 2024-01-09 RX ADMIN — SODIUM BICARBONATE 50 MEQ: 84 INJECTION, SOLUTION INTRAVENOUS at 11:41

## 2024-01-09 RX ADMIN — MORPHINE SULFATE 2 MG: 2 INJECTION, SOLUTION INTRAMUSCULAR; INTRAVENOUS at 11:41

## 2024-01-09 RX ADMIN — SODIUM POLYSTYRENE SULFONATE 15 G: 15 SUSPENSION ORAL; RECTAL at 11:41

## 2024-01-09 RX ADMIN — HEPARIN SODIUM 5000 UNITS: 5000 INJECTION INTRAVENOUS; SUBCUTANEOUS at 16:03

## 2024-01-09 RX ADMIN — HYDRALAZINE HYDROCHLORIDE 10 MG: 20 INJECTION, SOLUTION INTRAMUSCULAR; INTRAVENOUS at 22:28

## 2024-01-09 RX ADMIN — ISOSORBIDE DINITRATE 20 MG: 20 TABLET ORAL at 21:56

## 2024-01-09 RX ADMIN — SODIUM CHLORIDE, PRESERVATIVE FREE 10 ML: 5 INJECTION INTRAVENOUS at 22:01

## 2024-01-09 RX ADMIN — ASPIRIN 325 MG: 325 TABLET ORAL at 10:46

## 2024-01-09 RX ADMIN — ALUMINUM HYDROXIDE, MAGNESIUM HYDROXIDE, AND SIMETHICONE 30 ML: 200; 200; 20 SUSPENSION ORAL at 10:46

## 2024-01-09 RX ADMIN — ISOSORBIDE DINITRATE 20 MG: 20 TABLET ORAL at 16:04

## 2024-01-09 RX ADMIN — CALCIUM GLUCONATE 1000 MG: 10 INJECTION, SOLUTION INTRAVENOUS at 11:40

## 2024-01-09 ASSESSMENT — PAIN SCALES - GENERAL
PAINLEVEL_OUTOF10: 0
PAINLEVEL_OUTOF10: 6

## 2024-01-09 ASSESSMENT — ENCOUNTER SYMPTOMS
RESPIRATORY NEGATIVE: 1
ALLERGIC/IMMUNOLOGIC NEGATIVE: 1
GASTROINTESTINAL NEGATIVE: 1
EYES NEGATIVE: 1

## 2024-01-09 ASSESSMENT — PAIN DESCRIPTION - PAIN TYPE: TYPE: ACUTE PAIN

## 2024-01-09 ASSESSMENT — PAIN DESCRIPTION - LOCATION: LOCATION: CHEST

## 2024-01-09 ASSESSMENT — PAIN DESCRIPTION - FREQUENCY: FREQUENCY: INTERMITTENT

## 2024-01-09 ASSESSMENT — PAIN - FUNCTIONAL ASSESSMENT: PAIN_FUNCTIONAL_ASSESSMENT: ACTIVITIES ARE NOT PREVENTED

## 2024-01-09 ASSESSMENT — PAIN DESCRIPTION - ORIENTATION: ORIENTATION: ANTERIOR

## 2024-01-09 ASSESSMENT — PAIN DESCRIPTION - ONSET: ONSET: ON-GOING

## 2024-01-09 ASSESSMENT — PAIN DESCRIPTION - DESCRIPTORS: DESCRIPTORS: ACHING

## 2024-01-09 NOTE — PROGRESS NOTES
Patient arrived by wheelchair, stable,no complaints, no distress, skin intact, and Dr. Amin at bedside.

## 2024-01-09 NOTE — ED NOTES
Reports left side chest burning for past 2 days, does not radiate, denies n/v/d, also reports dizziness

## 2024-01-09 NOTE — H&P
History and Physical  Dr Ravinedr Amin MD      Chief Complaints:     Chief Complaint   Patient presents with    Chest Pain         Subjective:     Ayaz Villegas is a 58 y.o. male followed by Ayaz Delgadillo MD and [facility] in [place] and nephrologist  Dr Winsome Valentine MD of [facility] has a past medical history of Arrhythmia, Chronic kidney disease, 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, Diabetes (HCC), Hypertension, Iron deficiency anemia, Lumbar spondylosis, and Sinus problem.    Presents with chest pain that started 2 days ago which woke him up in the middle of the night with associated wheezing describes it as sharp and burning with no radiation has been off and on since that time but unable to determine what causing as well as making it go away.  He does note episodes of lightheadedness especially when he got up this morning he has also been having increased falls over the last several months.  He is also noticed his elevated blood pressure even though he is taking his medications he says sometimes his pharmacy does not get his medication to him but he did take all his medications this morning and he had all his medication bottles with him.  He also notes episodes of palpitations and recently had been placed on a event monitor which he turned it in on Friday.  He said on that day that he turned it in he had trouble with his vision on his right eye which he says he is scheduled to go and get injections.  On initial evaluation troponins are negative EKG did not show any acute ischemic changes PA lateral chest x-ray was also negative no focal deficits were noted on initial evaluation but with increasing falls and dizziness as well as vision changes will obtain MRI.  Blood pressure was markedly elevated at 185/85 which according to the patient similar range at home even on medications.  Patient was given aspirin 325 mg as well as Kayexalate for a potassium of 5.4.  There was

## 2024-01-09 NOTE — ED PROVIDER NOTES
unspecified type    2. Chest pain at rest          DISPOSITION/PLAN   DISPOSITION Admitted 01/09/2024 01:09:01 PM    Admit Note: Pt is being admitted by Dr. Amin. The results of their tests and reason(s) for their admission have been discussed with pt and/or available family. They convey agreement and understanding for the need to be admitted and for the admission diagnosis.           I am the Primary Clinician of Record. Ayaz Mcmanus MD (electronically signed)    (Please note that parts of this dictation were completed with voice recognition software. Quite often unanticipated grammatical, syntax, homophones, and other interpretive errors are inadvertently transcribed by the computer software. Please disregards these errors. Please excuse any errors that have escaped final proofreading.)      Ayaz Mcmanus MD  01/10/24 0005

## 2024-01-09 NOTE — PLAN OF CARE
Problem: Chronic Conditions and Co-morbidities  Goal: Patient's chronic conditions and co-morbidity symptoms are monitored and maintained or improved  Outcome: Progressing     Problem: Pain  Goal: Verbalizes/displays adequate comfort level or baseline comfort level  Outcome: Progressing

## 2024-01-10 LAB
ALBUMIN SERPL-MCNC: 2.9 G/DL (ref 3.5–5)
ANION GAP SERPL CALC-SCNC: 7 MMOL/L (ref 5–15)
BASOPHILS # BLD: 0 K/UL (ref 0–0.1)
BASOPHILS NFR BLD: 0 % (ref 0–1)
BUN SERPL-MCNC: 55 MG/DL (ref 6–20)
BUN/CREAT SERPL: 11 (ref 12–20)
CA-I BLD-MCNC: 8.6 MG/DL (ref 8.5–10.1)
CHLORIDE SERPL-SCNC: 109 MMOL/L (ref 97–108)
CO2 SERPL-SCNC: 28 MMOL/L (ref 21–32)
CREAT SERPL-MCNC: 5.17 MG/DL (ref 0.7–1.3)
DIFFERENTIAL METHOD BLD: ABNORMAL
EKG ATRIAL RATE: 72 BPM
EKG DIAGNOSIS: NORMAL
EKG P AXIS: -21 DEGREES
EKG P-R INTERVAL: 242 MS
EKG Q-T INTERVAL: 417 MS
EKG QRS DURATION: 90 MS
EKG QTC CALCULATION (BAZETT): 457 MS
EKG R AXIS: 45 DEGREES
EKG T AXIS: 70 DEGREES
EKG VENTRICULAR RATE: 72 BPM
EOSINOPHIL # BLD: 0.3 K/UL (ref 0–0.4)
EOSINOPHIL NFR BLD: 5 % (ref 0–7)
ERYTHROCYTE [DISTWIDTH] IN BLOOD BY AUTOMATED COUNT: 14.9 % (ref 11.5–14.5)
EST. AVERAGE GLUCOSE BLD GHB EST-MCNC: 146 MG/DL
GLUCOSE BLD STRIP.AUTO-MCNC: 101 MG/DL (ref 65–100)
GLUCOSE BLD STRIP.AUTO-MCNC: 108 MG/DL (ref 65–100)
GLUCOSE BLD STRIP.AUTO-MCNC: 112 MG/DL (ref 65–100)
GLUCOSE BLD STRIP.AUTO-MCNC: 121 MG/DL (ref 65–100)
GLUCOSE BLD STRIP.AUTO-MCNC: 168 MG/DL (ref 65–100)
GLUCOSE SERPL-MCNC: 112 MG/DL (ref 65–100)
HBA1C MFR BLD: 6.7 % (ref 4–5.6)
HCT VFR BLD AUTO: 23.2 % (ref 36.6–50.3)
HGB BLD-MCNC: 7.8 G/DL (ref 12.1–17)
IMM GRANULOCYTES # BLD AUTO: 0 K/UL (ref 0–0.04)
IMM GRANULOCYTES NFR BLD AUTO: 0 % (ref 0–0.5)
LYMPHOCYTES # BLD: 1.4 K/UL (ref 0.8–3.5)
LYMPHOCYTES NFR BLD: 25 % (ref 12–49)
MCH RBC QN AUTO: 29.1 PG (ref 26–34)
MCHC RBC AUTO-ENTMCNC: 33.6 G/DL (ref 30–36.5)
MCV RBC AUTO: 86.6 FL (ref 80–99)
MONOCYTES # BLD: 0.7 K/UL (ref 0–1)
MONOCYTES NFR BLD: 12 % (ref 5–13)
NEUTS SEG # BLD: 3.1 K/UL (ref 1.8–8)
NEUTS SEG NFR BLD: 58 % (ref 32–75)
NRBC # BLD: 0 K/UL (ref 0–0.01)
NRBC BLD-RTO: 0 PER 100 WBC
PERFORMED BY:: ABNORMAL
PHOSPHATE SERPL-MCNC: 3.6 MG/DL (ref 2.6–4.7)
PLATELET # BLD AUTO: 117 K/UL (ref 150–400)
PMV BLD AUTO: 9.9 FL (ref 8.9–12.9)
POTASSIUM SERPL-SCNC: 4.9 MMOL/L (ref 3.5–5.1)
RBC # BLD AUTO: 2.68 M/UL (ref 4.1–5.7)
SODIUM SERPL-SCNC: 144 MMOL/L (ref 136–145)
WBC # BLD AUTO: 5.5 K/UL (ref 4.1–11.1)

## 2024-01-10 PROCEDURE — 6360000002 HC RX W HCPCS: Performed by: HOSPITALIST

## 2024-01-10 PROCEDURE — 80069 RENAL FUNCTION PANEL: CPT

## 2024-01-10 PROCEDURE — 2580000003 HC RX 258: Performed by: HOSPITALIST

## 2024-01-10 PROCEDURE — 97161 PT EVAL LOW COMPLEX 20 MIN: CPT

## 2024-01-10 PROCEDURE — 94762 N-INVAS EAR/PLS OXIMTRY CONT: CPT

## 2024-01-10 PROCEDURE — 6370000000 HC RX 637 (ALT 250 FOR IP): Performed by: HOSPITALIST

## 2024-01-10 PROCEDURE — 85025 COMPLETE CBC W/AUTO DIFF WBC: CPT

## 2024-01-10 PROCEDURE — G0378 HOSPITAL OBSERVATION PER HR: HCPCS

## 2024-01-10 PROCEDURE — 82962 GLUCOSE BLOOD TEST: CPT

## 2024-01-10 PROCEDURE — 6370000000 HC RX 637 (ALT 250 FOR IP)

## 2024-01-10 PROCEDURE — 36415 COLL VENOUS BLD VENIPUNCTURE: CPT

## 2024-01-10 RX ORDER — CARVEDILOL 12.5 MG/1
25 TABLET ORAL 2 TIMES DAILY WITH MEALS
Status: DISCONTINUED | OUTPATIENT
Start: 2024-01-10 | End: 2024-01-11 | Stop reason: HOSPADM

## 2024-01-10 RX ORDER — LIDOCAINE 50 MG/G
OINTMENT TOPICAL ONCE
Status: DISCONTINUED | OUTPATIENT
Start: 2024-01-10 | End: 2024-01-10

## 2024-01-10 RX ORDER — NIFEDIPINE 30 MG/1
30 TABLET, EXTENDED RELEASE ORAL DAILY
Status: DISCONTINUED | OUTPATIENT
Start: 2024-01-10 | End: 2024-01-11 | Stop reason: HOSPADM

## 2024-01-10 RX ADMIN — CARVEDILOL 25 MG: 12.5 TABLET, FILM COATED ORAL at 17:34

## 2024-01-10 RX ADMIN — ISOSORBIDE DINITRATE 20 MG: 20 TABLET ORAL at 15:27

## 2024-01-10 RX ADMIN — SODIUM BICARBONATE 1300 MG: 650 TABLET ORAL at 08:18

## 2024-01-10 RX ADMIN — ISOSORBIDE DINITRATE 20 MG: 20 TABLET ORAL at 21:05

## 2024-01-10 RX ADMIN — NIFEDIPINE 30 MG: 30 TABLET, EXTENDED RELEASE ORAL at 11:49

## 2024-01-10 RX ADMIN — CARVEDILOL 37.5 MG: 12.5 TABLET, FILM COATED ORAL at 08:18

## 2024-01-10 RX ADMIN — SODIUM CHLORIDE, PRESERVATIVE FREE 10 ML: 5 INJECTION INTRAVENOUS at 08:24

## 2024-01-10 RX ADMIN — ATORVASTATIN CALCIUM 40 MG: 40 TABLET, FILM COATED ORAL at 08:18

## 2024-01-10 RX ADMIN — SODIUM CHLORIDE, PRESERVATIVE FREE 10 ML: 5 INJECTION INTRAVENOUS at 21:43

## 2024-01-10 RX ADMIN — HEPARIN SODIUM 5000 UNITS: 5000 INJECTION INTRAVENOUS; SUBCUTANEOUS at 05:53

## 2024-01-10 RX ADMIN — AMLODIPINE BESYLATE 10 MG: 10 TABLET ORAL at 08:18

## 2024-01-10 RX ADMIN — LIDOCAINE: 50 OINTMENT TOPICAL at 09:28

## 2024-01-10 RX ADMIN — HYDRALAZINE HYDROCHLORIDE 100 MG: 100 TABLET, FILM COATED ORAL at 21:05

## 2024-01-10 RX ADMIN — HYDRALAZINE HYDROCHLORIDE 100 MG: 100 TABLET, FILM COATED ORAL at 08:18

## 2024-01-10 RX ADMIN — HEPARIN SODIUM 5000 UNITS: 5000 INJECTION INTRAVENOUS; SUBCUTANEOUS at 15:27

## 2024-01-10 RX ADMIN — SODIUM BICARBONATE 1300 MG: 650 TABLET ORAL at 11:45

## 2024-01-10 RX ADMIN — ALUMINUM HYDROXIDE, MAGNESIUM HYDROXIDE, AND SIMETHICONE 30 ML: 200; 200; 20 SUSPENSION ORAL at 11:45

## 2024-01-10 RX ADMIN — HYDRALAZINE HYDROCHLORIDE 100 MG: 100 TABLET, FILM COATED ORAL at 15:27

## 2024-01-10 RX ADMIN — ASPIRIN 81 MG: 81 TABLET, COATED ORAL at 08:18

## 2024-01-10 RX ADMIN — ALUMINUM HYDROXIDE, MAGNESIUM HYDROXIDE, AND SIMETHICONE 30 ML: 200; 200; 20 SUSPENSION ORAL at 06:49

## 2024-01-10 RX ADMIN — SODIUM BICARBONATE 1300 MG: 650 TABLET ORAL at 17:34

## 2024-01-10 RX ADMIN — ISOSORBIDE DINITRATE 20 MG: 20 TABLET ORAL at 08:18

## 2024-01-10 RX ADMIN — GLIPIZIDE 5 MG: 5 TABLET ORAL at 08:18

## 2024-01-10 RX ADMIN — HEPARIN SODIUM 5000 UNITS: 5000 INJECTION INTRAVENOUS; SUBCUTANEOUS at 21:42

## 2024-01-10 ASSESSMENT — PAIN SCALES - GENERAL
PAINLEVEL_OUTOF10: 6
PAINLEVEL_OUTOF10: 0
PAINLEVEL_OUTOF10: 0
PAINLEVEL_OUTOF10: 5
PAINLEVEL_OUTOF10: 0
PAINLEVEL_OUTOF10: 0

## 2024-01-10 ASSESSMENT — PAIN SCALES - WONG BAKER
WONGBAKER_NUMERICALRESPONSE: 0
WONGBAKER_NUMERICALRESPONSE: 0

## 2024-01-10 ASSESSMENT — ENCOUNTER SYMPTOMS
EYES NEGATIVE: 1
GASTROINTESTINAL NEGATIVE: 1
RESPIRATORY NEGATIVE: 1
ALLERGIC/IMMUNOLOGIC NEGATIVE: 1

## 2024-01-10 NOTE — PLAN OF CARE
Problem: Chronic Conditions and Co-morbidities  Goal: Patient's chronic conditions and co-morbidity symptoms are monitored and maintained or improved  Outcome: Progressing  Flowsheets (Taken 1/9/2024 1945)  Care Plan - Patient's Chronic Conditions and Co-Morbidity Symptoms are Monitored and Maintained or Improved:   Monitor and assess patient's chronic conditions and comorbid symptoms for stability, deterioration, or improvement   Collaborate with multidisciplinary team to address chronic and comorbid conditions and prevent exacerbation or deterioration   Update acute care plan with appropriate goals if chronic or comorbid symptoms are exacerbated and prevent overall improvement and discharge     Problem: Pain  Goal: Verbalizes/displays adequate comfort level or baseline comfort level  Outcome: Progressing  Flowsheets (Taken 1/10/2024 5234)  Verbalizes/displays adequate comfort level or baseline comfort level:   Encourage patient to monitor pain and request assistance   Assess pain using appropriate pain scale   Administer analgesics based on type and severity of pain and evaluate response   Implement non-pharmacological measures as appropriate and evaluate response   Consider cultural and social influences on pain and pain management

## 2024-01-10 NOTE — CONSULTS
Iodinated Contrast Media Other (See Comments)    .      FAMILY HISTORY:  Family history reviewed.       SOCIAL HISTORY:  Notable for no tobacco use, no heavy alcohol or illicit drug use.      REVIEW OF SYSTEMS:  Complete review of systems performed, pertinents noted above, all other systems are negative.    PHYSICAL EXAMINATION:    General:  alert, oriented x3  Cardiovascular:  RRR, No murmur  Respiratory:  Lungs are clear  Abdomen:  soft, nontender  Extremities:  no lower extremity edema  Skin:  Dry, warm  Psych:  Normal affect      Vitals:    01/10/24 1136   BP: (!) 176/78   Pulse: 73   Resp:    Temp:    SpO2:        Recent labs results and imaging reviewed.     Discussed case with Dr. Kilgore and our impression and recommendations are as follows:  Chest pain  Trop x 2 50-52, non ACS trend.   Continue asa/statin  Monitor tele  Proceed with planned OP NST/echo  Obtain Ddimer   HFpEF  Recent TTE shows EF 55-60%, appears compensated on exam  He reports urine output but no I/Os in chart. Unclear if he responded well to lasix. He does not appear overloaded on exam  Monitor I/O's  Monitor tele/VS per unit protocol  Daily weights  Hyperkalemia  K 5.2, now 4.9  Given kayexalate and lasix  Monitor closely on tele  CKD stage 5, per neph recommendations, awaiting transplant? Has deferred dialysis.   HTN  Elevated BP, switch amlodipine to nifedipine, hydralazine 100 mg TID  Monitor per unit protocol  PRN IV hydralazine for SBP >180  HLD  Continue statin therapy    Thank you for involving us in the care of this patient.  Please do not hesitate to call me or Dr. Kilgore if additional questions arise.    Leonarda Dixon, APRN - CNP  1/10/2024

## 2024-01-10 NOTE — THERAPY EVALUATION
PHYSICAL THERAPY EVALUATION/DISCHARGE    Patient: Ayaz Villegas (58 y.o. male)  Date: 1/10/2024  Primary Diagnosis: Chest pain [R07.9]  Chest pain at rest [R07.9]  Chest pain, unspecified type [R07.9]       Precautions: Fall Risk                    ASSESSMENT AND RECOMMENDATIONS:  Based on the objective data below, the patient pt is independent without AD but often uses the wall for balance.  A cane has been recommended for ambulation outside the home and the patient reports he does own a cane but generally prefers not to use it.      Patient does not require further skilled physical therapy intervention at this level of care.       PLAN :  Recommendation for discharge: (in order for the patient to meet his/her long term goals): No skilled physical therapy    Other factors to consider for discharge: lives alone    IF patient discharges home will need the following DME: patient owns DME required for discharge       SUBJECTIVE:   Patient stated “I am feeling okay.”    OBJECTIVE DATA SUMMARY:     Past Medical History:   Diagnosis Date    Arrhythmia     heart murmur    CAD (coronary artery disease)     hx of stent    Chronic metabolic acidosis     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     CKD (chronic kidney disease) stage 5, GFR less than 15 ml/min (HCC)     Diabetes (HCC)     Hypertension     Iron deficiency anemia     Lumbar spondylosis     Sinus problem      Past Surgical History:   Procedure Laterality Date    CORONARY ANGIOPLASTY WITH STENT PLACEMENT      ORTHOPEDIC SURGERY      Neck surgery       Home Situation:  Social/Functional History  Lives With: Alone  Type of Home: House  Home Layout: One level  Home Access: Stairs to enter with rails  Entrance Stairs - Number of Steps: 8  Entrance Stairs - Rails: Both  Home Equipment: Walker, rolling  Has the patient had two or more falls in the past year or any fall with injury in the past year?: Yes  Receives Help From: Friend(s)  ADL Assistance:

## 2024-01-10 NOTE — PROGRESS NOTES
Hospitalist Progress Note          Dr. Ravinder Amin MD      Date:1/10/2024       Room:[facility]  Patient Name:Ayaz Villegas     YOB: 1965     Age:58 y.o.      Chief Complaint   Patient presents with    Chest Pain         HPI as per admitting provider on 1/9/2024  Ayaz Villegas is a 58 y.o. male followed by Ayaz Delgadillo MD and [facility] in [place] and nephrologist  Dr Winsome Valentine MD of [facility] has a past medical history of Arrhythmia, Chronic kidney disease, 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, Diabetes (HCC), Hypertension, Iron deficiency anemia, Lumbar spondylosis, and Sinus problem.    Presents with chest pain that started 2 days ago which woke him up in the middle of the night with associated wheezing describes it as sharp and burning with no radiation has been off and on since that time but unable to determine what causing as well as making it go away.  He does note episodes of lightheadedness especially when he got up this morning he has also been having increased falls over the last several months.  He is also noticed his elevated blood pressure even though he is taking his medications he says sometimes his pharmacy does not get his medication to him but he did take all his medications this morning and he had all his medication bottles with him.  He also notes episodes of palpitations and recently had been placed on a event monitor which he turned it in on Friday.  He said on that day that he turned it in he had trouble with his vision on his right eye which he says he is scheduled to go and get injections.  On initial evaluation troponins are negative EKG did not show any acute ischemic changes PA lateral chest x-ray was also negative no focal deficits were noted on initial evaluation but with increasing falls and dizziness as well as vision changes will obtain MRI.  Blood pressure was markedly elevated at 185/85 which according to the patient

## 2024-01-10 NOTE — PROGRESS NOTES
Spiritual Care Assessment/Progress Note  [facility]    Name: Ayaz Villegas MRN: [id number]    Age: 58 y.o.     Sex: male   Language: English     Date: 1/10/2024            Total Time Calculated: 15 min              Spiritual Assessment begun in [address]  Service Provided For:: Patient  Referral/Consult From:: Rounding  Encounter Overview/Reason : Initial Encounter    Spiritual beliefs:      [x] Involved in a sallie tradition/spiritual practice: Non-Denomination     [] Supported by a sallie community:      [] Claims no spiritual orientation:      [] Seeking spiritual identity:           [] Adheres to an individual form of spirituality:      [] Not able to assess:                Identified resources for coping and support system:   Support System: Children, Family members       [] Prayer                  [] Devotional reading               [] Music                  [] Guided Imagery     [] Pet visits                                        [] Other: (COMMENT)     Specific area/focus of visit   Encounter:    Crisis:    Spiritual/Emotional needs: Type: Spiritual Support  Ritual, Rites and Sacraments:    Grief, Loss, and Adjustments: Type: Life Adjustments, Adjustment to illness  Ethics/Mediation:    Behavioral Health:    Palliative Care:    Advance Care Planning:      Plan/Referrals: Continue Support (comment)    Narrative: Patient seen by MITA Aaron  Intern for Initial Spiritual Health Care Assessment. Patient is alone in his room resting in bed preparing to have lunch. Patient recognized this  from community. Shared update of life/review/legacy including being retired, and family history. Shared he had been having chest pain is the reason for being admitted to the hospital. Nurse in at this time to give medication. Shared he was waiting for follow up with cardiologist. Patient vented emotions regarding his adult children and how conflict with their

## 2024-01-11 ENCOUNTER — APPOINTMENT (OUTPATIENT)
Facility: HOSPITAL | Age: 59
End: 2024-01-11
Payer: MEDICAID

## 2024-01-11 VITALS
DIASTOLIC BLOOD PRESSURE: 75 MMHG | RESPIRATION RATE: 16 BRPM | OXYGEN SATURATION: 98 % | SYSTOLIC BLOOD PRESSURE: 151 MMHG | BODY MASS INDEX: 20.41 KG/M2 | HEIGHT: 74 IN | WEIGHT: 159 LBS | HEART RATE: 72 BPM | TEMPERATURE: 98.1 F

## 2024-01-11 PROBLEM — R79.89 ELEVATED D-DIMER: Status: ACTIVE | Noted: 2024-01-11

## 2024-01-11 LAB
ALBUMIN SERPL-MCNC: 2.8 G/DL (ref 3.5–5)
ANION GAP SERPL CALC-SCNC: 7 MMOL/L (ref 5–15)
BASOPHILS # BLD: 0 K/UL (ref 0–0.1)
BASOPHILS NFR BLD: 0 % (ref 0–1)
BUN SERPL-MCNC: 65 MG/DL (ref 6–20)
BUN/CREAT SERPL: 12 (ref 12–20)
CA-I BLD-MCNC: 8.4 MG/DL (ref 8.5–10.1)
CHLORIDE SERPL-SCNC: 107 MMOL/L (ref 97–108)
CO2 SERPL-SCNC: 27 MMOL/L (ref 21–32)
CREAT SERPL-MCNC: 5.27 MG/DL (ref 0.7–1.3)
D DIMER PPP FEU-MCNC: 1.23 UG/ML(FEU)
DIFFERENTIAL METHOD BLD: ABNORMAL
EOSINOPHIL # BLD: 0.3 K/UL (ref 0–0.4)
EOSINOPHIL NFR BLD: 5 % (ref 0–7)
ERYTHROCYTE [DISTWIDTH] IN BLOOD BY AUTOMATED COUNT: 14.6 % (ref 11.5–14.5)
GLUCOSE BLD STRIP.AUTO-MCNC: 106 MG/DL (ref 65–100)
GLUCOSE BLD STRIP.AUTO-MCNC: 135 MG/DL (ref 65–100)
GLUCOSE BLD STRIP.AUTO-MCNC: 94 MG/DL (ref 65–100)
GLUCOSE SERPL-MCNC: 93 MG/DL (ref 65–100)
HCT VFR BLD AUTO: 21.9 % (ref 36.6–50.3)
HGB BLD-MCNC: 7.3 G/DL (ref 12.1–17)
IMM GRANULOCYTES # BLD AUTO: 0 K/UL (ref 0–0.04)
IMM GRANULOCYTES NFR BLD AUTO: 0 % (ref 0–0.5)
LYMPHOCYTES # BLD: 1.7 K/UL (ref 0.8–3.5)
LYMPHOCYTES NFR BLD: 33 % (ref 12–49)
MCH RBC QN AUTO: 29.2 PG (ref 26–34)
MCHC RBC AUTO-ENTMCNC: 33.3 G/DL (ref 30–36.5)
MCV RBC AUTO: 87.6 FL (ref 80–99)
MONOCYTES # BLD: 0.8 K/UL (ref 0–1)
MONOCYTES NFR BLD: 16 % (ref 5–13)
NEUTS SEG # BLD: 2.4 K/UL (ref 1.8–8)
NEUTS SEG NFR BLD: 46 % (ref 32–75)
NRBC # BLD: 0 K/UL (ref 0–0.01)
NRBC BLD-RTO: 0 PER 100 WBC
PERFORMED BY:: ABNORMAL
PERFORMED BY:: ABNORMAL
PERFORMED BY:: NORMAL
PHOSPHATE SERPL-MCNC: 3.6 MG/DL (ref 2.6–4.7)
PLATELET # BLD AUTO: 111 K/UL (ref 150–400)
PMV BLD AUTO: 10.1 FL (ref 8.9–12.9)
POTASSIUM SERPL-SCNC: 5 MMOL/L (ref 3.5–5.1)
RBC # BLD AUTO: 2.5 M/UL (ref 4.1–5.7)
SODIUM SERPL-SCNC: 141 MMOL/L (ref 136–145)
WBC # BLD AUTO: 5.3 K/UL (ref 4.1–11.1)

## 2024-01-11 PROCEDURE — G0378 HOSPITAL OBSERVATION PER HR: HCPCS

## 2024-01-11 PROCEDURE — 85025 COMPLETE CBC W/AUTO DIFF WBC: CPT

## 2024-01-11 PROCEDURE — 6370000000 HC RX 637 (ALT 250 FOR IP)

## 2024-01-11 PROCEDURE — 6370000000 HC RX 637 (ALT 250 FOR IP): Performed by: HOSPITALIST

## 2024-01-11 PROCEDURE — A9540 TC99M MAA: HCPCS | Performed by: HOSPITALIST

## 2024-01-11 PROCEDURE — 82962 GLUCOSE BLOOD TEST: CPT

## 2024-01-11 PROCEDURE — 6360000002 HC RX W HCPCS: Performed by: HOSPITALIST

## 2024-01-11 PROCEDURE — 3430000000 HC RX DIAGNOSTIC RADIOPHARMACEUTICAL: Performed by: HOSPITALIST

## 2024-01-11 PROCEDURE — 36415 COLL VENOUS BLD VENIPUNCTURE: CPT

## 2024-01-11 PROCEDURE — 80069 RENAL FUNCTION PANEL: CPT

## 2024-01-11 PROCEDURE — 85379 FIBRIN DEGRADATION QUANT: CPT

## 2024-01-11 PROCEDURE — 2580000003 HC RX 258: Performed by: HOSPITALIST

## 2024-01-11 RX ORDER — HYDRALAZINE HYDROCHLORIDE 100 MG/1
100 TABLET, FILM COATED ORAL 3 TIMES DAILY
Qty: 90 TABLET | Refills: 0 | Status: SHIPPED | OUTPATIENT
Start: 2024-01-11

## 2024-01-11 RX ORDER — NIFEDIPINE 30 MG/1
30 TABLET, EXTENDED RELEASE ORAL DAILY
Qty: 30 TABLET | Refills: 0 | Status: SHIPPED | OUTPATIENT
Start: 2024-01-12

## 2024-01-11 RX ADMIN — ATORVASTATIN CALCIUM 40 MG: 40 TABLET, FILM COATED ORAL at 08:38

## 2024-01-11 RX ADMIN — HEPARIN SODIUM 5000 UNITS: 5000 INJECTION INTRAVENOUS; SUBCUTANEOUS at 05:44

## 2024-01-11 RX ADMIN — SODIUM BICARBONATE 1300 MG: 650 TABLET ORAL at 08:38

## 2024-01-11 RX ADMIN — ISOSORBIDE DINITRATE 20 MG: 20 TABLET ORAL at 13:09

## 2024-01-11 RX ADMIN — NIFEDIPINE 30 MG: 30 TABLET, EXTENDED RELEASE ORAL at 08:38

## 2024-01-11 RX ADMIN — CARVEDILOL 25 MG: 12.5 TABLET, FILM COATED ORAL at 08:38

## 2024-01-11 RX ADMIN — ISOSORBIDE DINITRATE 20 MG: 20 TABLET ORAL at 08:38

## 2024-01-11 RX ADMIN — KIT FOR THE PREPARATION OF TECHNETIUM TC 99M ALBUMIN AGGREGATED 3.05 MILLICURIE: 2.5 INJECTION, POWDER, FOR SOLUTION INTRAVENOUS at 10:15

## 2024-01-11 RX ADMIN — SODIUM BICARBONATE 1300 MG: 650 TABLET ORAL at 13:09

## 2024-01-11 RX ADMIN — HYDRALAZINE HYDROCHLORIDE 100 MG: 100 TABLET, FILM COATED ORAL at 13:09

## 2024-01-11 RX ADMIN — SODIUM CHLORIDE, PRESERVATIVE FREE 10 ML: 5 INJECTION INTRAVENOUS at 08:39

## 2024-01-11 RX ADMIN — HYDRALAZINE HYDROCHLORIDE 100 MG: 100 TABLET, FILM COATED ORAL at 08:38

## 2024-01-11 RX ADMIN — ASPIRIN 81 MG: 81 TABLET, COATED ORAL at 08:38

## 2024-01-11 ASSESSMENT — PAIN SCALES - GENERAL
PAINLEVEL_OUTOF10: 0

## 2024-01-11 NOTE — DISCHARGE INSTRUCTIONS
NOTIFY YOUR PHYSICIAN FOR ANY OF THE FOLLOWING:   Fever over 101 degrees for 24 hours.   Chest pain, shortness of breath, fever, chills, nausea, vomiting, diarrhea, change in mentation, falling, weakness, bleeding. Severe pain or pain not relieved by medications, as well as any other signs or symptoms that you may have questions about     Recommend monitoring daily weight and keeping log secondary to your CKD 5     Also recommend closer  monitoring of accuchecks at least 2-3 times per day and keep log so can reevaluate need for glipizide dosing which he have put on hold

## 2024-01-11 NOTE — RT PROTOCOL NOTE
Patient has been napping since returning from radiology. Aroused patient to encourage him to eat his lunch.

## 2024-01-11 NOTE — RT PROTOCOL NOTE
Discharged to home, ambulatory, accompanied by friend and writer to car. Discharge instructions and belongings with patient.

## 2024-01-11 NOTE — DISCHARGE SUMMARY
mmol/L    Anion Gap 7 5 - 15 mmol/L    Glucose 93 65 - 100 mg/dL    BUN 65 (H) 6 - 20 mg/dL    Creatinine 5.27 (H) 0.70 - 1.30 mg/dL    Bun/Cre Ratio 12 12 - 20      Est, Glom Filt Rate 12 (L) >60 ml/min/1.73m2    Calcium 8.4 (L) 8.5 - 10.1 mg/dL    Phosphorus 3.6 2.6 - 4.7 mg/dL    Albumin 2.8 (L) 3.5 - 5.0 g/dL   CBC with Auto Differential    Collection Time: 01/11/24  5:11 AM   Result Value Ref Range    WBC 5.3 4.1 - 11.1 K/uL    RBC 2.50 (L) 4.10 - 5.70 M/uL    Hemoglobin 7.3 (L) 12.1 - 17.0 g/dL    Hematocrit 21.9 (L) 36.6 - 50.3 %    MCV 87.6 80.0 - 99.0 FL    MCH 29.2 26.0 - 34.0 PG    MCHC 33.3 30.0 - 36.5 g/dL    RDW 14.6 (H) 11.5 - 14.5 %    Platelets 111 (L) 150 - 400 K/uL    MPV 10.1 8.9 - 12.9 FL    Nucleated RBCs 0.0 0.0  WBC    nRBC 0.00 0.00 - 0.01 K/uL    Neutrophils % 46 32 - 75 %    Lymphocytes % 33 12 - 49 %    Monocytes % 16 (H) 5 - 13 %    Eosinophils % 5 0 - 7 %    Basophils % 0 0 - 1 %    Immature Granulocytes 0 0 - 0.5 %    Neutrophils Absolute 2.4 1.8 - 8.0 K/UL    Lymphocytes Absolute 1.7 0.8 - 3.5 K/UL    Monocytes Absolute 0.8 0.0 - 1.0 K/UL    Eosinophils Absolute 0.3 0.0 - 0.4 K/UL    Basophils Absolute 0.0 0.0 - 0.1 K/UL    Absolute Immature Granulocyte 0.0 0.00 - 0.04 K/UL    Differential Type AUTOMATED     D-Dimer, Quantitative    Collection Time: 01/11/24  5:11 AM   Result Value Ref Range    D-Dimer, Quant 1.23 (H) <0.50 ug/ml(FEU)   POCT Glucose    Collection Time: 01/11/24  7:56 AM   Result Value Ref Range    POC Glucose 94 65 - 100 mg/dL    Performed by: Tess Valdez    POCT Glucose    Collection Time: 01/11/24 11:13 AM   Result Value Ref Range    POC Glucose 135 (H) 65 - 100 mg/dL    Performed by: Tess Valdez      .result      Imaging:  MRI BRAIN WO CONTRAST    Result Date: 1/9/2024  1. No acute intracranial abnormality. 2. Unchanged mild chronic microvascular ischemic disease.     XR CHEST (2 VW)    Result Date: 1/9/2024  Normal PA and lateral chest views.

## 2024-01-12 ENCOUNTER — CLINICAL DOCUMENTATION (OUTPATIENT)
Facility: HOSPITAL | Age: 59
End: 2024-01-12

## 2024-01-12 NOTE — PROGRESS NOTES
CARDIOLOGY CONSULTATION    REASON FOR CONSULT: chest pain    REQUESTING PROVIDER: Dr. Amin    CHIEF COMPLAINT:  chest pain    HISTORY OF PRESENT ILLNESS:  Ayaz Villegas is a 58 y.o. year-old male with past medical history significant for CKD stage 5, not on dialysis, DM, HTN, HFpEF who was evaluated today due to chest pain. Labs show initial K 5.4, BNP 3861, Creat 4.86, BUN 53. He was given 1x dose lasix. He also received kayexalate, K now 4.9. He endorses burning substernal chest pain that comes and goes. He denies shortness of breath. He  was seen in our [place] location recently and NST/echo were scheduled and are pending. MCOT results also pending in chart. He follows with [facility] and apparently is on transplant list however has deferred dialysis and continues to defer. Troponin x2 52-50. Non ACS trend. Will plan to obtain D-dimer but otherwise he is stable from a cardiovascular standpoint for discharge and should follow up with nephrologist as well as cardiology as OP.     1/11: ddimer elevated, vq scan performed and pending. He reports some chest pain but states it is much improved. He denies SOB. He should follow up with [facility] and keep scheduled appointment with our office for upcoming NST and echo. BP improved with switch to Nifedipine. Continue as OP    Records from hospital admission course thus far reviewed.      Telemetry reviewed.  No events overnight. SR 70-80's    INPATIENT MEDICATIONS:  Home medications reviewed.  No current facility-administered medications for this encounter.    Current Outpatient Medications:     SITagliptin (JANUVIA) 25 MG tablet, Take 1 tablet by mouth daily, Disp: 30 tablet, Rfl: 1    hydrALAZINE (APRESOLINE) 100 MG tablet, Take 1 tablet by mouth 3 times daily, Disp: 90 tablet, Rfl: 0    NIFEdipine (PROCARDIA XL) 30 MG extended release tablet, Take 1 tablet by mouth daily, Disp: 30 tablet, Rfl: 0    calcitRIOL (ROCALTROL) 0.5 MCG capsule, Take 1 capsule by mouth

## 2024-02-06 ENCOUNTER — TELEPHONE (OUTPATIENT)
Facility: HOSPITAL | Age: 59
End: 2024-02-06

## 2024-03-25 ENCOUNTER — APPOINTMENT (OUTPATIENT)
Facility: HOSPITAL | Age: 59
End: 2024-03-25
Attending: EMERGENCY MEDICINE
Payer: MEDICAID

## 2024-03-25 ENCOUNTER — HOSPITAL ENCOUNTER (INPATIENT)
Facility: HOSPITAL | Age: 59
LOS: 5 days | Discharge: HOME | End: 2024-03-30
Attending: EMERGENCY MEDICINE | Admitting: FAMILY MEDICINE
Payer: MEDICAID

## 2024-03-25 ENCOUNTER — HOSPITAL ENCOUNTER (EMERGENCY)
Facility: HOSPITAL | Age: 59
Discharge: ANOTHER HOSPITAL | End: 2024-03-25
Attending: EMERGENCY MEDICINE
Payer: MEDICAID

## 2024-03-25 VITALS
DIASTOLIC BLOOD PRESSURE: 115 MMHG | BODY MASS INDEX: 20.53 KG/M2 | OXYGEN SATURATION: 97 % | WEIGHT: 160 LBS | HEIGHT: 74 IN | RESPIRATION RATE: 15 BRPM | TEMPERATURE: 98 F | SYSTOLIC BLOOD PRESSURE: 188 MMHG | HEART RATE: 88 BPM

## 2024-03-25 DIAGNOSIS — R07.9 CHEST PAIN, UNSPECIFIED TYPE: Primary | ICD-10-CM

## 2024-03-25 DIAGNOSIS — E87.20 METABOLIC ACIDOSIS: ICD-10-CM

## 2024-03-25 DIAGNOSIS — R79.89 ELEVATED TROPONIN: ICD-10-CM

## 2024-03-25 DIAGNOSIS — E83.42 HYPOMAGNESEMIA: ICD-10-CM

## 2024-03-25 LAB
ALBUMIN SERPL-MCNC: 2.9 G/DL (ref 3.5–5)
ALBUMIN/GLOB SERPL: 0.6 (ref 1.1–2.2)
ALP SERPL-CCNC: 114 U/L (ref 45–117)
ALT SERPL-CCNC: 25 U/L (ref 12–78)
ANION GAP SERPL CALC-SCNC: 13 MMOL/L (ref 5–15)
AST SERPL W P-5'-P-CCNC: 20 U/L (ref 15–37)
BASOPHILS # BLD: 0 K/UL (ref 0–0.1)
BASOPHILS NFR BLD: 0 % (ref 0–1)
BILIRUB SERPL-MCNC: 0.4 MG/DL (ref 0.2–1)
BUN SERPL-MCNC: 56 MG/DL (ref 6–20)
BUN/CREAT SERPL: 11 (ref 12–20)
CA-I BLD-MCNC: 8 MG/DL (ref 8.5–10.1)
CHLORIDE SERPL-SCNC: 111 MMOL/L (ref 97–108)
CO2 SERPL-SCNC: 16 MMOL/L (ref 21–32)
CREAT SERPL-MCNC: 5.06 MG/DL (ref 0.7–1.3)
DIFFERENTIAL METHOD BLD: ABNORMAL
EKG ATRIAL RATE: 86 BPM
EKG DIAGNOSIS: NORMAL
EKG P AXIS: 64 DEGREES
EKG P-R INTERVAL: 226 MS
EKG Q-T INTERVAL: 366 MS
EKG QRS DURATION: 76 MS
EKG QTC CALCULATION (BAZETT): 437 MS
EKG R AXIS: 36 DEGREES
EKG T AXIS: 83 DEGREES
EKG VENTRICULAR RATE: 86 BPM
EOSINOPHIL # BLD: 0.3 K/UL (ref 0–0.4)
EOSINOPHIL NFR BLD: 5 % (ref 0–7)
ERYTHROCYTE [DISTWIDTH] IN BLOOD BY AUTOMATED COUNT: 17 % (ref 11.5–14.5)
GLOBULIN SER CALC-MCNC: 4.7 G/DL (ref 2–4)
GLUCOSE BLD STRIP.AUTO-MCNC: 173 MG/DL (ref 65–100)
GLUCOSE BLD STRIP.AUTO-MCNC: 181 MG/DL (ref 65–100)
GLUCOSE SERPL-MCNC: 122 MG/DL (ref 65–100)
HCT VFR BLD AUTO: 23.9 % (ref 36.6–50.3)
HGB BLD-MCNC: 7.6 G/DL (ref 12.1–17)
IMM GRANULOCYTES # BLD AUTO: 0 K/UL (ref 0–0.04)
IMM GRANULOCYTES NFR BLD AUTO: 0 % (ref 0–0.5)
LYMPHOCYTES # BLD: 1.5 K/UL (ref 0.8–3.5)
LYMPHOCYTES NFR BLD: 21 % (ref 12–49)
MAGNESIUM SERPL-MCNC: 1.4 MG/DL (ref 1.6–2.4)
MCH RBC QN AUTO: 28.9 PG (ref 26–34)
MCHC RBC AUTO-ENTMCNC: 31.8 G/DL (ref 30–36.5)
MCV RBC AUTO: 90.9 FL (ref 80–99)
MONOCYTES # BLD: 0.8 K/UL (ref 0–1)
MONOCYTES NFR BLD: 12 % (ref 5–13)
NEUTS SEG # BLD: 4.2 K/UL (ref 1.8–8)
NEUTS SEG NFR BLD: 62 % (ref 32–75)
NRBC # BLD: 0 K/UL (ref 0–0.01)
NRBC BLD-RTO: 0 PER 100 WBC
PERFORMED BY:: ABNORMAL
PERFORMED BY:: ABNORMAL
PLATELET # BLD AUTO: 157 K/UL (ref 150–400)
PMV BLD AUTO: 10.1 FL (ref 8.9–12.9)
POTASSIUM SERPL-SCNC: 5.3 MMOL/L (ref 3.5–5.1)
PROT SERPL-MCNC: 7.6 G/DL (ref 6.4–8.2)
RBC # BLD AUTO: 2.63 M/UL (ref 4.1–5.7)
SODIUM SERPL-SCNC: 140 MMOL/L (ref 136–145)
TROPONIN I SERPL HS-MCNC: 130 NG/L (ref 0–76)
TROPONIN I SERPL HS-MCNC: 88 NG/L (ref 0–76)
TROPONIN I SERPL HS-MCNC: 91 NG/L (ref 0–76)
WBC # BLD AUTO: 6.8 K/UL (ref 4.1–11.1)

## 2024-03-25 PROCEDURE — 84484 ASSAY OF TROPONIN QUANT: CPT

## 2024-03-25 PROCEDURE — 6360000002 HC RX W HCPCS: Performed by: NURSE PRACTITIONER

## 2024-03-25 PROCEDURE — 6370000000 HC RX 637 (ALT 250 FOR IP): Performed by: NURSE PRACTITIONER

## 2024-03-25 PROCEDURE — 36415 COLL VENOUS BLD VENIPUNCTURE: CPT

## 2024-03-25 PROCEDURE — 93005 ELECTROCARDIOGRAM TRACING: CPT | Performed by: EMERGENCY MEDICINE

## 2024-03-25 PROCEDURE — 85025 COMPLETE CBC W/AUTO DIFF WBC: CPT

## 2024-03-25 PROCEDURE — 83735 ASSAY OF MAGNESIUM: CPT

## 2024-03-25 PROCEDURE — 2580000003 HC RX 258: Performed by: NURSE PRACTITIONER

## 2024-03-25 PROCEDURE — 82962 GLUCOSE BLOOD TEST: CPT

## 2024-03-25 PROCEDURE — 4500000002 HC ER NO CHARGE

## 2024-03-25 PROCEDURE — 6360000002 HC RX W HCPCS: Performed by: EMERGENCY MEDICINE

## 2024-03-25 PROCEDURE — 80053 COMPREHEN METABOLIC PANEL: CPT

## 2024-03-25 PROCEDURE — 2060000000 HC ICU INTERMEDIATE R&B

## 2024-03-25 PROCEDURE — 71045 X-RAY EXAM CHEST 1 VIEW: CPT

## 2024-03-25 PROCEDURE — 99285 EMERGENCY DEPT VISIT HI MDM: CPT

## 2024-03-25 RX ORDER — HYDRALAZINE HYDROCHLORIDE 50 MG/1
100 TABLET, FILM COATED ORAL 3 TIMES DAILY
Status: DISCONTINUED | OUTPATIENT
Start: 2024-03-25 | End: 2024-03-30 | Stop reason: HOSPADM

## 2024-03-25 RX ORDER — ATORVASTATIN CALCIUM 40 MG/1
40 TABLET, FILM COATED ORAL DAILY
Status: DISCONTINUED | OUTPATIENT
Start: 2024-03-25 | End: 2024-03-30 | Stop reason: HOSPADM

## 2024-03-25 RX ORDER — ACETAMINOPHEN 325 MG/1
650 TABLET ORAL EVERY 6 HOURS PRN
Status: DISCONTINUED | OUTPATIENT
Start: 2024-03-25 | End: 2024-03-30 | Stop reason: HOSPADM

## 2024-03-25 RX ORDER — INSULIN LISPRO 100 [IU]/ML
0-8 INJECTION, SOLUTION INTRAVENOUS; SUBCUTANEOUS
Status: DISCONTINUED | OUTPATIENT
Start: 2024-03-25 | End: 2024-03-30 | Stop reason: HOSPADM

## 2024-03-25 RX ORDER — CALCITRIOL 0.25 UG/1
0.5 CAPSULE, LIQUID FILLED ORAL DAILY
Status: DISCONTINUED | OUTPATIENT
Start: 2024-03-25 | End: 2024-03-27

## 2024-03-25 RX ORDER — SODIUM POLYSTYRENE SULFONATE 15 G/60ML
15 SUSPENSION ORAL; RECTAL ONCE
Status: COMPLETED | OUTPATIENT
Start: 2024-03-25 | End: 2024-03-25

## 2024-03-25 RX ORDER — HEPARIN SODIUM 5000 [USP'U]/ML
5000 INJECTION, SOLUTION INTRAVENOUS; SUBCUTANEOUS EVERY 8 HOURS SCHEDULED
Status: DISCONTINUED | OUTPATIENT
Start: 2024-03-25 | End: 2024-03-30 | Stop reason: HOSPADM

## 2024-03-25 RX ORDER — AMLODIPINE BESYLATE 5 MG/1
10 TABLET ORAL DAILY
Status: DISCONTINUED | OUTPATIENT
Start: 2024-03-25 | End: 2024-03-30 | Stop reason: HOSPADM

## 2024-03-25 RX ORDER — ASPIRIN 325 MG
325 TABLET, DELAYED RELEASE (ENTERIC COATED) ORAL DAILY
Status: DISCONTINUED | OUTPATIENT
Start: 2024-03-25 | End: 2024-03-26

## 2024-03-25 RX ORDER — ISOSORBIDE DINITRATE 20 MG/1
20 TABLET ORAL 3 TIMES DAILY
Status: DISCONTINUED | OUTPATIENT
Start: 2024-03-25 | End: 2024-03-30 | Stop reason: HOSPADM

## 2024-03-25 RX ORDER — SODIUM BICARBONATE 650 MG/1
1300 TABLET ORAL
Status: DISCONTINUED | OUTPATIENT
Start: 2024-03-25 | End: 2024-03-30 | Stop reason: HOSPADM

## 2024-03-25 RX ORDER — SODIUM CHLORIDE 9 MG/ML
INJECTION, SOLUTION INTRAVENOUS PRN
Status: DISCONTINUED | OUTPATIENT
Start: 2024-03-25 | End: 2024-03-30 | Stop reason: HOSPADM

## 2024-03-25 RX ORDER — ACETAMINOPHEN 650 MG/1
650 SUPPOSITORY RECTAL EVERY 6 HOURS PRN
Status: DISCONTINUED | OUTPATIENT
Start: 2024-03-25 | End: 2024-03-30 | Stop reason: HOSPADM

## 2024-03-25 RX ORDER — AMLODIPINE BESYLATE 10 MG/1
10 TABLET ORAL DAILY
Status: ON HOLD | COMMUNITY
End: 2024-03-30

## 2024-03-25 RX ORDER — INSULIN LISPRO 100 [IU]/ML
0-4 INJECTION, SOLUTION INTRAVENOUS; SUBCUTANEOUS NIGHTLY
Status: DISCONTINUED | OUTPATIENT
Start: 2024-03-25 | End: 2024-03-30 | Stop reason: HOSPADM

## 2024-03-25 RX ORDER — ALOGLIPTIN 6.25 MG/1
6.25 TABLET, FILM COATED ORAL DAILY
Status: DISCONTINUED | OUTPATIENT
Start: 2024-03-25 | End: 2024-03-30 | Stop reason: HOSPADM

## 2024-03-25 RX ORDER — DEXTROSE MONOHYDRATE 100 MG/ML
INJECTION, SOLUTION INTRAVENOUS CONTINUOUS PRN
Status: DISCONTINUED | OUTPATIENT
Start: 2024-03-25 | End: 2024-03-30 | Stop reason: HOSPADM

## 2024-03-25 RX ORDER — ENOXAPARIN SODIUM 100 MG/ML
30 INJECTION SUBCUTANEOUS DAILY
Status: DISCONTINUED | OUTPATIENT
Start: 2024-03-25 | End: 2024-03-25

## 2024-03-25 RX ORDER — SODIUM CHLORIDE 0.9 % (FLUSH) 0.9 %
5-40 SYRINGE (ML) INJECTION EVERY 12 HOURS SCHEDULED
Status: DISCONTINUED | OUTPATIENT
Start: 2024-03-25 | End: 2024-03-30 | Stop reason: HOSPADM

## 2024-03-25 RX ORDER — SODIUM CHLORIDE 0.9 % (FLUSH) 0.9 %
5-40 SYRINGE (ML) INJECTION PRN
Status: DISCONTINUED | OUTPATIENT
Start: 2024-03-25 | End: 2024-03-30 | Stop reason: HOSPADM

## 2024-03-25 RX ORDER — CARVEDILOL 12.5 MG/1
37.5 TABLET ORAL 2 TIMES DAILY WITH MEALS
Status: DISCONTINUED | OUTPATIENT
Start: 2024-03-25 | End: 2024-03-30 | Stop reason: HOSPADM

## 2024-03-25 RX ORDER — HYDRALAZINE HYDROCHLORIDE 20 MG/ML
10 INJECTION INTRAMUSCULAR; INTRAVENOUS EVERY 6 HOURS PRN
Status: DISCONTINUED | OUTPATIENT
Start: 2024-03-25 | End: 2024-03-30 | Stop reason: HOSPADM

## 2024-03-25 RX ORDER — ONDANSETRON 4 MG/1
4 TABLET, ORALLY DISINTEGRATING ORAL EVERY 8 HOURS PRN
Status: DISCONTINUED | OUTPATIENT
Start: 2024-03-25 | End: 2024-03-30 | Stop reason: HOSPADM

## 2024-03-25 RX ORDER — ONDANSETRON 2 MG/ML
4 INJECTION INTRAMUSCULAR; INTRAVENOUS EVERY 6 HOURS PRN
Status: DISCONTINUED | OUTPATIENT
Start: 2024-03-25 | End: 2024-03-30 | Stop reason: HOSPADM

## 2024-03-25 RX ORDER — MAGNESIUM SULFATE IN WATER 40 MG/ML
2000 INJECTION, SOLUTION INTRAVENOUS
Status: DISCONTINUED | OUTPATIENT
Start: 2024-03-25 | End: 2024-03-25 | Stop reason: HOSPADM

## 2024-03-25 RX ORDER — SODIUM CHLORIDE 9 MG/ML
INJECTION, SOLUTION INTRAVENOUS CONTINUOUS
Status: DISCONTINUED | OUTPATIENT
Start: 2024-03-25 | End: 2024-03-26

## 2024-03-25 RX ORDER — ASPIRIN 81 MG/1
81 TABLET ORAL DAILY
Status: DISCONTINUED | OUTPATIENT
Start: 2024-03-26 | End: 2024-03-30 | Stop reason: HOSPADM

## 2024-03-25 RX ORDER — POLYETHYLENE GLYCOL 3350 17 G/17G
17 POWDER, FOR SOLUTION ORAL DAILY PRN
Status: DISCONTINUED | OUTPATIENT
Start: 2024-03-25 | End: 2024-03-30 | Stop reason: HOSPADM

## 2024-03-25 RX ORDER — GLUCAGON 1 MG/ML
1 KIT INJECTION PRN
Status: DISCONTINUED | OUTPATIENT
Start: 2024-03-25 | End: 2024-03-30 | Stop reason: HOSPADM

## 2024-03-25 RX ORDER — MAGNESIUM SULFATE 1 G/100ML
1000 INJECTION INTRAVENOUS ONCE
Status: COMPLETED | OUTPATIENT
Start: 2024-03-25 | End: 2024-03-25

## 2024-03-25 RX ADMIN — ATORVASTATIN CALCIUM 40 MG: 40 TABLET, FILM COATED ORAL at 18:37

## 2024-03-25 RX ADMIN — CALCITRIOL CAPSULES 0.25 MCG 0.5 MCG: 0.25 CAPSULE ORAL at 19:30

## 2024-03-25 RX ADMIN — HYDRALAZINE HYDROCHLORIDE 100 MG: 50 TABLET ORAL at 21:25

## 2024-03-25 RX ADMIN — SODIUM CHLORIDE, PRESERVATIVE FREE 10 ML: 5 INJECTION INTRAVENOUS at 21:26

## 2024-03-25 RX ADMIN — SODIUM POLYSTYRENE SULFONATE 15 G: 15 SUSPENSION ORAL; RECTAL at 19:04

## 2024-03-25 RX ADMIN — HEPARIN SODIUM 5000 UNITS: 5000 INJECTION INTRAVENOUS; SUBCUTANEOUS at 22:19

## 2024-03-25 RX ADMIN — CARVEDILOL 37.5 MG: 12.5 TABLET, FILM COATED ORAL at 18:37

## 2024-03-25 RX ADMIN — ASPIRIN 325 MG: 325 TABLET, COATED ORAL at 19:31

## 2024-03-25 RX ADMIN — SODIUM CHLORIDE: 9 INJECTION, SOLUTION INTRAVENOUS at 18:41

## 2024-03-25 RX ADMIN — AMLODIPINE BESYLATE 10 MG: 5 TABLET ORAL at 18:37

## 2024-03-25 RX ADMIN — ISOSORBIDE DINITRATE 20 MG: 20 TABLET ORAL at 19:32

## 2024-03-25 RX ADMIN — SODIUM BICARBONATE 1300 MG: 650 TABLET ORAL at 19:31

## 2024-03-25 RX ADMIN — ALOGLIPTIN 6.25 MG: 6.25 TABLET, FILM COATED ORAL at 19:29

## 2024-03-25 RX ADMIN — MAGNESIUM SULFATE 1000 MG: 1 INJECTION INTRAVENOUS at 18:56

## 2024-03-25 ASSESSMENT — LIFESTYLE VARIABLES
HOW MANY STANDARD DRINKS CONTAINING ALCOHOL DO YOU HAVE ON A TYPICAL DAY: PATIENT DOES NOT DRINK
HOW OFTEN DO YOU HAVE A DRINK CONTAINING ALCOHOL: NEVER

## 2024-03-25 ASSESSMENT — PAIN DESCRIPTION - DESCRIPTORS: DESCRIPTORS: PRESSURE

## 2024-03-25 ASSESSMENT — PAIN - FUNCTIONAL ASSESSMENT
PAIN_FUNCTIONAL_ASSESSMENT: 0-10
PAIN_FUNCTIONAL_ASSESSMENT: 0-10
PAIN_FUNCTIONAL_ASSESSMENT: PREVENTS OR INTERFERES WITH MANY ACTIVE NOT PASSIVE ACTIVITIES

## 2024-03-25 ASSESSMENT — PAIN SCALES - GENERAL
PAINLEVEL_OUTOF10: 7
PAINLEVEL_OUTOF10: 4
PAINLEVEL_OUTOF10: 5

## 2024-03-25 ASSESSMENT — PAIN DESCRIPTION - LOCATION
LOCATION: CHEST
LOCATION: CHEST

## 2024-03-25 ASSESSMENT — PAIN DESCRIPTION - PAIN TYPE: TYPE: ACUTE PAIN

## 2024-03-25 NOTE — ED NOTES
Spoke with pt about noncompliance with meds. 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 States he has not had the money for medications or to see MD \"in awhile\".

## 2024-03-25 NOTE — ED PROVIDER NOTES
Reassessment: Sepsis reassessment not applicable    Clinical Management Tools:  Not Applicable    Patient was given the following medications:  Medications - No data to display    CONSULTS: See ED Course/MDM for further details.  None     Social Determinants affecting Diagnosis/Treatment: None    Smoking Cessation: Not Applicable    PROCEDURES   Unless otherwise noted above, none.  Procedures      CRITICAL CARE TIME   Patient does not meet Critical Care Time, 0 minutes    ED IMPRESSION     1. Chest pain, unspecified type    2. Metabolic acidosis          DISPOSITION/PLAN   DISPOSITION Decision To Admit 03/25/2024 02:15:20 PM    Discharge Note: The patient is stable for discharge home. The signs, symptoms, diagnosis, and discharge instructions have been discussed, understanding conveyed, and agreed upon. The patient is to follow up as recommended or return to ER should their symptoms worsen.      PATIENT REFERRED TO:  No follow-up provider specified.      DISCHARGE MEDICATIONS:     Medication List        ASK your doctor about these medications      atorvastatin 40 MG tablet  Commonly known as: LIPITOR     calcitRIOL 0.5 MCG capsule  Commonly known as: ROCALTROL     carvedilol 25 MG tablet  Commonly known as: COREG     hydrALAZINE 100 MG tablet  Commonly known as: APRESOLINE  Take 1 tablet by mouth 3 times daily     isosorbide dinitrate 20 MG tablet  Commonly known as: ISORDIL     NIFEdipine 30 MG extended release tablet  Commonly known as: PROCARDIA XL  Take 1 tablet by mouth daily     SITagliptin 25 MG tablet  Commonly known as: Januvia  Take 1 tablet by mouth daily     sodium bicarbonate 650 MG tablet                DISCONTINUED MEDICATIONS:  Current Discharge Medication List          I am the Primary Clinician of Record: Julissa Pittman MD (electronically signed)    (Please note that parts of this dictation were completed with voice recognition software. Quite often unanticipated grammatical, syntax,

## 2024-03-25 NOTE — H&P
History and Physical    NAME:   Ayaz Villegas   :  1965   MRN:  [id number]     Date/Time: 3/25/2024 6:22 PM    Patient PCP: Ayaz Delgadillo MD  ______________________________________________________________________       Subjective:     CHIEF COMPLAINT:   Chest pain      HISTORY OF PRESENT ILLNESS:     General Daily Progress Note  Patient is a 58 y.o. year old male past medical history of CKD stage V diabetes hypertension history of CAD was transferred from outside hospital for chest pain.  Troponin was noted to be 88 and 91.  BUN 56 creatinine 5.06 magnesium 1.4 potassium elevated at 5.3.  Chest x-ray showed no acute process.  Patient seen in ED still complaining of intermittent chest pain more of a pressure pain denies any nausea or vomiting.  Patient to be admitted for further evaluation and treatment.    Past Medical History:   Diagnosis Date    Arrhythmia     heart murmur    CAD (coronary artery disease)     hx of stent    Chronic metabolic acidosis     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     CKD (chronic kidney disease) stage 5, GFR less than 15 ml/min (HCC)     Diabetes (HCC)     Hypertension     Iron deficiency anemia     Lumbar spondylosis     Sinus problem         Past Surgical History:   Procedure Laterality Date    CORONARY ANGIOPLASTY WITH STENT PLACEMENT      ORTHOPEDIC SURGERY      Neck surgery       Social History     Tobacco Use    Smoking status: Never    Smokeless tobacco: Never   Substance Use Topics    Alcohol use: Not Currently        Family History   Problem Relation Age of Onset    No Known Problems Brother     No Known Problems Brother     No Known Problems Brother     No Known Problems Sister     No Known Problems Sister     No Known Problems Sister     Diabetes Sister     No Known Problems Sister     Dementia Father     Diabetes Mother        Allergies   Allergen Reactions    Lisinopril Other (See Comments)     Patient stated \"It messed my kidneys up\"    Iodine Nausea And  Calculation (Bazett) 484 ms    P Axis 60 degrees    R Axis 42 degrees    T Axis 75 degrees    Diagnosis       Sinus rhythm  Atrial premature complex  Prolonged MS interval  Probable left atrial enlargement  Probable left ventricular hypertrophy  Borderline prolonged QT interval     CBC with Auto Differential    Collection Time: 03/25/24  8:53 AM   Result Value Ref Range    WBC 6.8 4.1 - 11.1 K/uL    RBC 2.63 (L) 4.10 - 5.70 M/uL    Hemoglobin 7.6 (L) 12.1 - 17.0 g/dL    Hematocrit 23.9 (L) 36.6 - 50.3 %    MCV 90.9 80.0 - 99.0 FL    MCH 28.9 26.0 - 34.0 PG    MCHC 31.8 30.0 - 36.5 g/dL    RDW 17.0 (H) 11.5 - 14.5 %    Platelets 157 150 - 400 K/uL    MPV 10.1 8.9 - 12.9 FL    Nucleated RBCs 0.0 0.0  WBC    nRBC 0.00 0.00 - 0.01 K/uL    Neutrophils % 62 32 - 75 %    Lymphocytes % 21 12 - 49 %    Monocytes % 12 5 - 13 %    Eosinophils % 5 0 - 7 %    Basophils % 0 0 - 1 %    Immature Granulocytes 0 0 - 0.5 %    Neutrophils Absolute 4.2 1.8 - 8.0 K/UL    Lymphocytes Absolute 1.5 0.8 - 3.5 K/UL    Monocytes Absolute 0.8 0.0 - 1.0 K/UL    Eosinophils Absolute 0.3 0.0 - 0.4 K/UL    Basophils Absolute 0.0 0.0 - 0.1 K/UL    Absolute Immature Granulocyte 0.0 0.00 - 0.04 K/UL    Differential Type AUTOMATED     Comprehensive Metabolic Panel    Collection Time: 03/25/24  8:53 AM   Result Value Ref Range    Sodium 140 136 - 145 mmol/L    Potassium 5.3 (H) 3.5 - 5.1 mmol/L    Chloride 111 (H) 97 - 108 mmol/L    CO2 16 (L) 21 - 32 mmol/L    Anion Gap 13 5 - 15 mmol/L    Glucose 122 (H) 65 - 100 mg/dL    BUN 56 (H) 6 - 20 mg/dL    Creatinine 5.06 (H) 0.70 - 1.30 mg/dL    Bun/Cre Ratio 11 (L) 12 - 20      Est, Glom Filt Rate 12 (L) >60 ml/min/1.73m2    Calcium 8.0 (L) 8.5 - 10.1 mg/dL    Total Bilirubin 0.4 0.2 - 1.0 mg/dL    AST 20 15 - 37 U/L    ALT 25 12 - 78 U/L    Alk Phosphatase 114 45 - 117 U/L    Total Protein 7.6 6.4 - 8.2 g/dL    Albumin 2.9 (L) 3.5 - 5.0 g/dL    Globulin 4.7 (H) 2.0 - 4.0 g/dL    Albumin/Globulin

## 2024-03-25 NOTE — ED TRIAGE NOTES
Pt arrives via ems from [facility] for acute kidney failure and ckd evaluation.     A&Ox4  upon arrival. Ambulatory with assistance.

## 2024-03-25 NOTE — ED TRIAGE NOTES
PT reports Chest pressure and palpitations intermittently x 2 weeks. Pt reports \"my heart is pounding and sometimes beats twice\". Pt states \"I just don't feel good.Pt also reports Headache Voices no other symptoms. PT bp noted 205/100 in triage.

## 2024-03-25 NOTE — ED PROVIDER NOTES
[facility] EMERGENCY DEPT  EMERGENCY DEPARTMENT HISTORY AND PHYSICAL EXAM      Date: 3/25/2024  Patient Name: Ayaz Villegas  MRN: [id number]  YOB: 1965  Date of evaluation: 3/25/2024  Provider: Jaymie Diaz MD   Note Started: 8:58 AM EDT 3/25/24    HISTORY OF PRESENT ILLNESS     Chief Complaint   Patient presents with    Chest Pain       History Provided By: Patient    HPI: Ayaz Villegas is a 58 y.o. male     PAST MEDICAL HISTORY   Past Medical History:  Past Medical History:   Diagnosis Date    Arrhythmia     heart murmur    CAD (coronary artery disease)     hx of stent    Chronic metabolic acidosis     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     CKD (chronic kidney disease) stage 5, GFR less than 15 ml/min (HCC)     Diabetes (HCC)     Hypertension     Iron deficiency anemia     Lumbar spondylosis     Sinus problem        Past Surgical History:  Past Surgical History:   Procedure Laterality Date    CORONARY ANGIOPLASTY WITH STENT PLACEMENT      ORTHOPEDIC SURGERY      Neck surgery       Family History:  Family History   Problem Relation Age of Onset    No Known Problems Brother     No Known Problems Brother     No Known Problems Brother     No Known Problems Sister     No Known Problems Sister     No Known Problems Sister     Diabetes Sister     No Known Problems Sister     Dementia Father     Diabetes Mother        Social History:  Social History     Tobacco Use    Smoking status: Never    Smokeless tobacco: Never   Substance Use Topics    Alcohol use: Not Currently    Drug use: Not Currently     Types: Marijuana (Weed)       Allergies:  Allergies   Allergen Reactions    Lisinopril Other (See Comments)     Patient stated \"It messed my kidneys up\"    Iodine Nausea And Vomiting    Iodinated Contrast Media Other (See Comments)       PCP: Ayaz Delgadillo MD    Current Meds:   No current facility-administered medications for this encounter.     Current Outpatient Medications   Medication Sig

## 2024-03-25 NOTE — CARE COORDINATION
CM notified of patient's financial stressors & lack of insurance coverage.  CM contacted [employer] @ phone# [phone]. They stated that patient's coverage ended on January 1st, 2024. He currently has a \"[employer]\" policy per [employer].    CM was transferred to [employer] representative at phone# [phone]. Representative states that he is enrolled with the [employer] program but she is unable to provide benefits as CM not listed as an authorized person to give benefit information to. Representative states that in general Plan[employer] is a supplemental policy NOT primary coverage. Therefore, patient is uninsured for primary coverage. Patient is over-resource for Medicaid.    CM to review financial resources to provide to patient.   Cm to continue to follow for DC needs.    Greta Wells RN-MSN  Care Management

## 2024-03-26 LAB
ALBUMIN SERPL-MCNC: 2.8 G/DL (ref 3.5–5)
ALBUMIN/GLOB SERPL: 0.8 (ref 1.1–2.2)
ALP SERPL-CCNC: 105 U/L (ref 45–117)
ALT SERPL-CCNC: 21 U/L (ref 12–78)
ANION GAP SERPL CALC-SCNC: 2 MMOL/L (ref 5–15)
AST SERPL W P-5'-P-CCNC: 14 U/L (ref 15–37)
BASOPHILS # BLD: 0.1 K/UL (ref 0–0.1)
BASOPHILS NFR BLD: 1 % (ref 0–1)
BILIRUB SERPL-MCNC: 0.2 MG/DL (ref 0.2–1)
BUN SERPL-MCNC: 54 MG/DL (ref 6–20)
BUN/CREAT SERPL: 11 (ref 12–20)
CA-I BLD-MCNC: 7.9 MG/DL (ref 8.5–10.1)
CHLORIDE SERPL-SCNC: 124 MMOL/L (ref 97–108)
CHOLEST SERPL-MCNC: 165 MG/DL
CO2 SERPL-SCNC: 19 MMOL/L (ref 21–32)
CREAT SERPL-MCNC: 5.12 MG/DL (ref 0.7–1.3)
DIFFERENTIAL METHOD BLD: ABNORMAL
EKG ATRIAL RATE: 66 BPM
EKG ATRIAL RATE: 98 BPM
EKG DIAGNOSIS: NORMAL
EKG DIAGNOSIS: NORMAL
EKG P AXIS: 29 DEGREES
EKG P AXIS: 60 DEGREES
EKG P-R INTERVAL: 164 MS
EKG P-R INTERVAL: 218 MS
EKG Q-T INTERVAL: 377 MS
EKG Q-T INTERVAL: 387 MS
EKG QRS DURATION: 106 MS
EKG QRS DURATION: 88 MS
EKG QTC CALCULATION (BAZETT): 406 MS
EKG QTC CALCULATION (BAZETT): 484 MS
EKG R AXIS: 11 DEGREES
EKG R AXIS: 42 DEGREES
EKG T AXIS: 47 DEGREES
EKG T AXIS: 75 DEGREES
EKG VENTRICULAR RATE: 66 BPM
EKG VENTRICULAR RATE: 99 BPM
EOSINOPHIL # BLD: 0.3 K/UL (ref 0–0.4)
EOSINOPHIL NFR BLD: 6 % (ref 0–7)
ERYTHROCYTE [DISTWIDTH] IN BLOOD BY AUTOMATED COUNT: 16 % (ref 11.5–14.5)
GLOBULIN SER CALC-MCNC: 3.4 G/DL (ref 2–4)
GLUCOSE BLD STRIP.AUTO-MCNC: 116 MG/DL (ref 65–100)
GLUCOSE BLD STRIP.AUTO-MCNC: 176 MG/DL (ref 65–100)
GLUCOSE BLD STRIP.AUTO-MCNC: 182 MG/DL (ref 65–100)
GLUCOSE BLD STRIP.AUTO-MCNC: 246 MG/DL (ref 65–100)
GLUCOSE SERPL-MCNC: 119 MG/DL (ref 65–100)
HCT VFR BLD AUTO: 20.9 % (ref 36.6–50.3)
HDLC SERPL-MCNC: 42 MG/DL
HDLC SERPL: 3.9 (ref 0–5)
HGB BLD-MCNC: 6.7 G/DL (ref 12.1–17)
IMM GRANULOCYTES # BLD AUTO: 0 K/UL (ref 0–0.04)
IMM GRANULOCYTES NFR BLD AUTO: 0 % (ref 0–0.5)
LDLC SERPL CALC-MCNC: 104.6 MG/DL (ref 0–100)
LIPID PANEL: ABNORMAL
LYMPHOCYTES # BLD: 1.4 K/UL (ref 0.8–3.5)
LYMPHOCYTES NFR BLD: 24 % (ref 12–49)
MAGNESIUM SERPL-MCNC: 2.1 MG/DL (ref 1.6–2.4)
MCH RBC QN AUTO: 28.4 PG (ref 26–34)
MCHC RBC AUTO-ENTMCNC: 32.1 G/DL (ref 30–36.5)
MCV RBC AUTO: 88.6 FL (ref 80–99)
MONOCYTES # BLD: 0.6 K/UL (ref 0–1)
MONOCYTES NFR BLD: 11 % (ref 5–13)
NEUTS SEG # BLD: 3.4 K/UL (ref 1.8–8)
NEUTS SEG NFR BLD: 58 % (ref 32–75)
NRBC # BLD: 0 K/UL (ref 0–0.01)
NRBC BLD-RTO: 0 PER 100 WBC
PERFORMED BY:: ABNORMAL
PLATELET # BLD AUTO: 146 K/UL (ref 150–400)
PMV BLD AUTO: 9.9 FL (ref 8.9–12.9)
POTASSIUM SERPL-SCNC: 5.2 MMOL/L (ref 3.5–5.1)
PROT SERPL-MCNC: 6.2 G/DL (ref 6.4–8.2)
RBC # BLD AUTO: 2.36 M/UL (ref 4.1–5.7)
RBC MORPH BLD: ABNORMAL
SODIUM SERPL-SCNC: 145 MMOL/L (ref 136–145)
TRIGL SERPL-MCNC: 92 MG/DL
TROPONIN I SERPL HS-MCNC: 99 NG/L (ref 0–76)
VLDLC SERPL CALC-MCNC: 18.4 MG/DL
WBC # BLD AUTO: 5.8 K/UL (ref 4.1–11.1)

## 2024-03-26 PROCEDURE — 30233N1 TRANSFUSION OF NONAUTOLOGOUS RED BLOOD CELLS INTO PERIPHERAL VEIN, PERCUTANEOUS APPROACH: ICD-10-PCS | Performed by: FAMILY MEDICINE

## 2024-03-26 PROCEDURE — 2580000003 HC RX 258: Performed by: NURSE PRACTITIONER

## 2024-03-26 PROCEDURE — 6360000002 HC RX W HCPCS: Performed by: NURSE PRACTITIONER

## 2024-03-26 PROCEDURE — 36430 TRANSFUSION BLD/BLD COMPNT: CPT

## 2024-03-26 PROCEDURE — 86901 BLOOD TYPING SEROLOGIC RH(D): CPT

## 2024-03-26 PROCEDURE — 86850 RBC ANTIBODY SCREEN: CPT

## 2024-03-26 PROCEDURE — 86923 COMPATIBILITY TEST ELECTRIC: CPT

## 2024-03-26 PROCEDURE — 36415 COLL VENOUS BLD VENIPUNCTURE: CPT

## 2024-03-26 PROCEDURE — 6360000002 HC RX W HCPCS: Performed by: INTERNAL MEDICINE

## 2024-03-26 PROCEDURE — 80053 COMPREHEN METABOLIC PANEL: CPT

## 2024-03-26 PROCEDURE — 86900 BLOOD TYPING SEROLOGIC ABO: CPT

## 2024-03-26 PROCEDURE — 83735 ASSAY OF MAGNESIUM: CPT

## 2024-03-26 PROCEDURE — 80061 LIPID PANEL: CPT

## 2024-03-26 PROCEDURE — 6370000000 HC RX 637 (ALT 250 FOR IP): Performed by: FAMILY MEDICINE

## 2024-03-26 PROCEDURE — 85025 COMPLETE CBC W/AUTO DIFF WBC: CPT

## 2024-03-26 PROCEDURE — 6370000000 HC RX 637 (ALT 250 FOR IP): Performed by: NURSE PRACTITIONER

## 2024-03-26 PROCEDURE — 84484 ASSAY OF TROPONIN QUANT: CPT

## 2024-03-26 PROCEDURE — P9016 RBC LEUKOCYTES REDUCED: HCPCS

## 2024-03-26 PROCEDURE — 2060000000 HC ICU INTERMEDIATE R&B

## 2024-03-26 PROCEDURE — 82962 GLUCOSE BLOOD TEST: CPT

## 2024-03-26 RX ORDER — SODIUM CHLORIDE 9 MG/ML
INJECTION, SOLUTION INTRAVENOUS PRN
Status: DISCONTINUED | OUTPATIENT
Start: 2024-03-26 | End: 2024-03-30 | Stop reason: HOSPADM

## 2024-03-26 RX ADMIN — SODIUM BICARBONATE 1300 MG: 650 TABLET ORAL at 13:03

## 2024-03-26 RX ADMIN — ISOSORBIDE DINITRATE 20 MG: 20 TABLET ORAL at 20:55

## 2024-03-26 RX ADMIN — EPOETIN ALFA-EPBX 10000 UNITS: 10000 INJECTION, SOLUTION INTRAVENOUS; SUBCUTANEOUS at 17:21

## 2024-03-26 RX ADMIN — INSULIN LISPRO 2 UNITS: 100 INJECTION, SOLUTION INTRAVENOUS; SUBCUTANEOUS at 13:02

## 2024-03-26 RX ADMIN — SODIUM CHLORIDE, PRESERVATIVE FREE 10 ML: 5 INJECTION INTRAVENOUS at 20:40

## 2024-03-26 RX ADMIN — HYDRALAZINE HYDROCHLORIDE 100 MG: 50 TABLET ORAL at 20:54

## 2024-03-26 RX ADMIN — SODIUM CHLORIDE, PRESERVATIVE FREE 10 ML: 5 INJECTION INTRAVENOUS at 08:09

## 2024-03-26 RX ADMIN — ISOSORBIDE DINITRATE 20 MG: 20 TABLET ORAL at 08:10

## 2024-03-26 RX ADMIN — SODIUM BICARBONATE 1300 MG: 650 TABLET ORAL at 17:21

## 2024-03-26 RX ADMIN — HEPARIN SODIUM 5000 UNITS: 5000 INJECTION INTRAVENOUS; SUBCUTANEOUS at 13:02

## 2024-03-26 RX ADMIN — AMLODIPINE BESYLATE 10 MG: 5 TABLET ORAL at 08:10

## 2024-03-26 RX ADMIN — CARVEDILOL 37.5 MG: 12.5 TABLET, FILM COATED ORAL at 17:21

## 2024-03-26 RX ADMIN — CARVEDILOL 37.5 MG: 12.5 TABLET, FILM COATED ORAL at 08:09

## 2024-03-26 RX ADMIN — ISOSORBIDE DINITRATE 20 MG: 20 TABLET ORAL at 13:03

## 2024-03-26 RX ADMIN — SODIUM ZIRCONIUM CYCLOSILICATE 5 G: 5 POWDER, FOR SUSPENSION ORAL at 14:21

## 2024-03-26 RX ADMIN — SODIUM BICARBONATE 1300 MG: 650 TABLET ORAL at 08:10

## 2024-03-26 RX ADMIN — ASPIRIN 325 MG: 325 TABLET, COATED ORAL at 08:10

## 2024-03-26 RX ADMIN — ATORVASTATIN CALCIUM 40 MG: 40 TABLET, FILM COATED ORAL at 08:10

## 2024-03-26 RX ADMIN — HEPARIN SODIUM 5000 UNITS: 5000 INJECTION INTRAVENOUS; SUBCUTANEOUS at 05:49

## 2024-03-26 RX ADMIN — ALOGLIPTIN 6.25 MG: 6.25 TABLET, FILM COATED ORAL at 08:10

## 2024-03-26 RX ADMIN — HYDRALAZINE HYDROCHLORIDE 100 MG: 50 TABLET ORAL at 08:10

## 2024-03-26 RX ADMIN — HYDRALAZINE HYDROCHLORIDE 100 MG: 50 TABLET ORAL at 13:03

## 2024-03-26 RX ADMIN — CALCITRIOL CAPSULES 0.25 MCG 0.5 MCG: 0.25 CAPSULE ORAL at 08:10

## 2024-03-26 RX ADMIN — HEPARIN SODIUM 5000 UNITS: 5000 INJECTION INTRAVENOUS; SUBCUTANEOUS at 20:55

## 2024-03-26 NOTE — PROGRESS NOTES
LABALBU 2.9* 2.8*   GLOB 4.7* 3.4     No results for input(s): \"INR\", \"PROTIME\", \"APTT\" in the last 72 hours.   No results for input(s): \"IRON\", \"TIBC\", \"FERRITIN\" in the last 72 hours.    Invalid input(s): \"PSAT\"   Lab Results   Component Value Date/Time    MUYTWCBS77 623 01/09/2024 10:35 AM    FOLATE 16.3 01/09/2024 10:35 AM      No results for input(s): \"PH\", \"PCO2\", \"PO2\" in the last 72 hours.  Recent Labs     03/25/24  1019 03/25/24  1845 03/26/24  1130   TROPHS 91* 130* 99*     Lab Results   Component Value Date/Time    CHOL 134 01/09/2024 10:35 AM    TRIG 115 01/09/2024 10:35 AM    HDL 42 01/09/2024 10:35 AM    LDLCALC 69 01/09/2024 10:35 AM    CHOLHDLRATIO 3.2 01/09/2024 10:35 AM     Lab Results   Component Value Date/Time    POCGLU 246 03/26/2024 12:45 PM    POCGLU 116 03/26/2024 07:53 AM    POCGLU 173 03/25/2024 09:24 PM    POCGLU 181 03/25/2024 06:55 PM    POCGLU 135 01/11/2024 11:13 AM     Lab Results   Component Value Date/Time    COLORU Yellow/Straw 06/27/2022 09:10 AM    CLARITYU Clear 06/27/2022 09:10 AM    GLUCOSEU Negative 06/27/2022 09:10 AM    BILIRUBINUR Negative 06/27/2022 09:10 AM    KETUA Negative 06/27/2022 09:10 AM    SPECGRAV 1.010 06/27/2022 09:10 AM    BLOODU Small 06/27/2022 09:10 AM    PHUR 7.0 06/27/2022 09:10 AM    PROTEINU 100 06/27/2022 09:10 AM    NITRU Negative 06/27/2022 09:10 AM    LEUKOCYTESUR Trace 06/27/2022 09:10 AM         Assessment:     Chest pain with elevated troponin  Hyperkalemia  Anemia hemoglobin of 6.7\  Hypomagnesia  Chronic kidney disease stage V  Hypertension  2 diabetes      Plan:       Transfuse 1 unit packed RBC discussed with the patient at the bedside  Replace potassium    Follow-up with the nephrology and the cardiology    Repeat the labs      Current Facility-Administered Medications:     sodium zirconium cyclosilicate (LOKELMA) oral suspension 5 g, 5 g, Oral, Once, Nicola Cao MD    amLODIPine (NORVASC) tablet 10 mg, 10 mg, Oral, Daily, King  IntraVENous, Q6H PRN, Adriana Mayberry APRN - CNP    insulin lispro (HUMALOG) injection vial 0-8 Units, 0-8 Units, SubCUTAneous, TID WC, Adriana Mayberry APRN - CNP, 2 Units at 03/26/24 1302    insulin lispro (HUMALOG) injection vial 0-4 Units, 0-4 Units, SubCUTAneous, Nightly, Adriana Mayberry APRN - CNP    glucose chewable tablet 16 g, 4 tablet, Oral, PRN, Adriana Mayberry APRN - CNP    dextrose bolus 10% 125 mL, 125 mL, IntraVENous, PRN **OR** dextrose bolus 10% 250 mL, 250 mL, IntraVENous, PRN, Adriana Mayberry APRN - CNP    glucagon injection 1 mg, 1 mg, SubCUTAneous, PRN, Adriana Mayberry APRN - CNP    dextrose 10 % infusion, , IntraVENous, Continuous PRN, Adriana Mayberry APRN - CNP    heparin (porcine) injection 5,000 Units, 5,000 Units, SubCUTAneous, 3 times per day, Adriana Mayberry APRN - CNP, 5,000 Units at 03/26/24 1302

## 2024-03-26 NOTE — CONSULTS
NAME:  Ayaz Villegas   :   1965   MRN:   [id number]     ATTENDING: Nicola Cao MD  PCP:  Ayaz Delgadillo MD    Date/Time:  3/26/2024       Subjective:   REQUESTING PHYSICIAN:    REASON FOR CONSULT:   CKD 5    History of presenting illness:    Patient is a 58-year-old male with past medical history including CKD stage V, hypertension, CAD, diabetes mellitus, anemia, chronic metabolic acidosis as well as others listed below who presented to the emergency room with complaint of chest pressure and palpitations.  Patient has followed with Dr. MATTSON and Dr. Williamson in the past, his last visit was in 2024.  Patient presents with creatinine of 5.12, EGFR of 12, which appears to be his baseline.  Metabolic acidosis present on admission, improving today.  Chest x-ray is negative for pulmonary edema.  Hemoglobin 6.7 for which transfusion of PRBCs will be ordered.  Mild hyperkalemia present, Lokelma 5 g x 1 ordered by primary.    Patient seen and examined in room.  He is awake, alert and oriented x 3, and in no acute distress.  He reports no issues or symptoms at the time of visit.  Denies chest pain, shortness of breath, N/V, fever or chills, or swelling in extremities.    Past Medical History:   Diagnosis Date    Arrhythmia     heart murmur    CAD (coronary artery disease)     hx of stent    Chronic metabolic acidosis     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     CKD (chronic kidney disease) stage 5, GFR less than 15 ml/min (HCC)     Diabetes (HCC)     Hypertension     Iron deficiency anemia     Lumbar spondylosis     Sinus problem       Past Surgical History:   Procedure Laterality Date    CORONARY ANGIOPLASTY WITH STENT PLACEMENT      ORTHOPEDIC SURGERY      Neck surgery     Social History     Tobacco Use    Smoking status: Never    Smokeless tobacco: Never   Substance Use Topics    Alcohol use: Not Currently      Family History   Problem Relation Age of Onset    No Known Problems Brother     No

## 2024-03-26 NOTE — CARE COORDINATION
03/26/24 1523   Service Assessment   Patient Orientation Alert and Oriented   Cognition Alert   History Provided By Patient   Primary Caregiver Self   Support Systems Friends/Neighbors   Patient's Healthcare Decision Maker is: Legal Next of Kin   PCP Verified by CM No   Prior Functional Level Independent in ADLs/IADLs   Current Functional Level Independent in ADLs/IADLs   Can patient return to prior living arrangement Yes   Ability to make needs known: Good   Family able to assist with home care needs: No   Would you like for me to discuss the discharge plan with any other family members/significant others, and if so, who? No   Social/Functional History   Lives With Friend(s)   Type of Home Mobile home   Home Layout One level   Home Access Stairs to enter with rails     Patient lives with a friend in a mobile home with 7 steps to the entrance, address on chart confirmed.      Patient recently d/c'd from out patient PT, states he was recc'd to use a cane but does not use any DME at this time.  States he does require some assistance with IADL's and his friend that lives with him helps him.    Patient states he has no car at this time and is unable to get to his appts, his insurance does not have transportation benefit at this time.      Patient utilizes [facility] in [place] for medications, no issues obtaining, declined meds to beds.    No CM needs at this time.    Advance Care Planning     General Advance Care Planning (ACP) Conversation    Date of Conversation: 3/26/2024  Conducted with: Patient with Decision Making Capacity    Healthcare Decision Maker:    Primary Decision Maker: Maryjane Villegas - [facility] - [phone]  Click here to complete Healthcare Decision Makers including selection of the Healthcare Decision Maker Relationship (ie \"Primary\").   Today we discussed Healthcare Decision Makers. The patient is considering options.    Content/Action Overview:  Has NO ACP documents-Information provided  Reviewed DNR/DNI  and patient elects Full Code (Attempt Resuscitation)        Length of Voluntary ACP Conversation in minutes:  <16 minutes (Non-Billable)    Lalita Flores MSW

## 2024-03-26 NOTE — CONSULTS
CARDIOLOGY CONSULTATION    REASON FOR CONSULT: Chest pain    REQUESTING PROVIDER: Adriana Mayberry NP    CHIEF COMPLAINT:  Chest pain and shortness of breath    HISTORY OF PRESENT ILLNESS:  Ayaz Villegas is a 58 y.o. year-old male with past medical history significant for CKD stage 5, not on dialysis, DM, HTN, HFpEF who was evaluated today due to chest pain.  He notes shortness of breath and chest tightness at home.  He has swelling in his legs.  He endorses orthopnea, difficult to lay in certain positions given sciatica.  Feeling a little better, frustrated with medicaid/insurance coverage.  Recent echo in 1/24 below      Left Ventricle: Normal left ventricular systolic function with a visually estimated EF of 55 - 60%. Left ventricle size is normal. Moderately increased wall thickness. Normal wall motion. Diastolic function present with increased LAP with normal LV EF.    Aortic Valve: Mild regurgitation.    Mitral Valve: Mild to moderate regurgitation.    Tricuspid Valve: Mild regurgitation. The estimated RVSP is 46 mmHg.    Pulmonic Valve: Mild regurgitation.    Left Atrium: Left atrium is moderately dilated.    Pericardium: Small (<1 cm) pericardial effusion present.    Image quality is good.    Records from hospital admission course thus far reviewed.      Telemetry reviewed.  No events overnight .    INPATIENT MEDICATIONS:  Home medications reviewed.    Current Facility-Administered Medications:     0.9 % sodium chloride infusion, , IntraVENous, PRN, Nicola Cao MD    epoetin laney-epbx (RETACRIT) injection 10,000 Units, 10,000 Units, SubCUTAneous, Weekly, Ellen Ramos MD    amLODIPine (NORVASC) tablet 10 mg, 10 mg, Oral, Daily, Adriana Mayberry APRN - CNP, 10 mg at 03/26/24 0810    atorvastatin (LIPITOR) tablet 40 mg, 40 mg, Oral, Daily, Adriana Mayberry APRN - CNP, 40 mg at 03/26/24 0810    calcitRIOL (ROCALTROL) capsule 0.5 mcg, 0.5 mcg, Oral, Daily, Adriana Mayberry APRN - CNP, 0.5 mcg  at 03/26/24 0810    carvedilol (COREG) tablet 37.5 mg, 37.5 mg, Oral, BID , Adriana Mayberry APRN - CNP, 37.5 mg at 03/26/24 0809    hydrALAZINE (APRESOLINE) tablet 100 mg, 100 mg, Oral, TID, Adriana Mayberry APRN - CNP, 100 mg at 03/26/24 1303    isosorbide dinitrate (ISORDIL) tablet 20 mg, 20 mg, Oral, TID, Adriana Mayberry APRN - CNP, 20 mg at 03/26/24 1303    alogliptin (NESINA) tablet 6.25 mg, 6.25 mg, Oral, Daily, Adriana Mayberry APRN - CNP, 6.25 mg at 03/26/24 0810    sodium bicarbonate tablet 1,300 mg, 1,300 mg, Oral, TID , Adriaan Mayberry APRN - CNP, 1,300 mg at 03/26/24 1303    sodium chloride flush 0.9 % injection 5-40 mL, 5-40 mL, IntraVENous, 2 times per day, Adriana Mayberry APRN - CNP, 10 mL at 03/26/24 0809    sodium chloride flush 0.9 % injection 5-40 mL, 5-40 mL, IntraVENous, PRN, Adriana Mayberry APRN - CNP    0.9 % sodium chloride infusion, , IntraVENous, PRN, Adriana Mayberry APRN - CNP    ondansetron (ZOFRAN-ODT) disintegrating tablet 4 mg, 4 mg, Oral, Q8H PRN **OR** ondansetron (ZOFRAN) injection 4 mg, 4 mg, IntraVENous, Q6H PRN, Adriana Mayberry APRN - CNP    polyethylene glycol (GLYCOLAX) packet 17 g, 17 g, Oral, Daily PRN, Adriana Mayberry APRN - CNP    acetaminophen (TYLENOL) tablet 650 mg, 650 mg, Oral, Q6H PRN **OR** acetaminophen (TYLENOL) suppository 650 mg, 650 mg, Rectal, Q6H PRN, Adriana Mayberry APRN - CNP    aspirin EC tablet 81 mg, 81 mg, Oral, Daily, Adriana Mayberry APRN - CNP    hydrALAZINE (APRESOLINE) injection 10 mg, 10 mg, IntraVENous, Q6H PRN, Adriana Mayberry APRN - CNP    insulin lispro (HUMALOG) injection vial 0-8 Units, 0-8 Units, SubCUTAneous, TID WC, Adriana Mayberry APRN - CNP, 2 Units at 03/26/24 1302    insulin lispro (HUMALOG) injection vial 0-4 Units, 0-4 Units, SubCUTAneous, Nightly, Adriana Mayberry APRN - CNP    glucose chewable tablet 16 g, 4 tablet, Oral, PRN, Adriana Mayberry APRN - CNP    dextrose bolus 10% 125 mL,

## 2024-03-26 NOTE — PROGRESS NOTES
4 Eyes Skin Assessment     NAME:  Ayaz Villegas  YOB: 1965  MEDICAL RECORD NUMBER:  [id number]    The patient is being assessed for  Admission    I agree that at least one RN has performed a thorough Head to Toe Skin Assessment on the patient. ALL assessment sites listed below have been assessed.      Areas assessed by both nurses:    Head, Face, Ears, Shoulders, Back, Chest, Arms, Elbows, Hands, Sacrum. Buttock, Coccyx, Ischium, and Legs. Feet and Heels        Does the Patient have a Wound? No noted wound(s)       Prakash Prevention initiated by RN: No  Wound Care Orders initiated by RN: No    Pressure Injury (Stage 3,4, Unstageable, DTI, NWPT, and Complex wounds) if present, place Wound referral order by RN under : No    New Ostomies, if present place, Ostomy referral order under : No     Nurse 1 eSignature: Electronically signed by Teresa Mckenzie RN on 3/25/24 at 10:49 PM EDT    **SHARE this note so that the co-signing nurse can place an eSignature**    Nurse 2 eSignature: Electronically signed by Kate esteban RN on 3/25/24 at 10:49 PM EDT

## 2024-03-26 NOTE — ED NOTES
ED TO INPATIENT SBAR HANDOFF    Patient Name: Ayaz Villegas   Preferred Name: Ayaz  : 1965  58 y.o.   Family/Caregiver Present: no   Code Status Order: Full Code  PO Status:   Telemetry Order: yes  C-SSRS: Risk of Suicide: No Risk  Sitter no   Restraints:   no  Sepsis Risk Score Sepsis Risk Score: 1.31    Situation  Chief Complaint   Patient presents with    Transfer of Care     Brief Description of Patient's Condition: Pt is alert and oriented x4 and ambulates with steady gait. Pt came in for chest pain.   Mental Status: oriented and alert  Arrived from:Home  Imaging:   No orders to display     Abnormal labs:   Abnormal Labs Reviewed   TROPONIN - Abnormal; Notable for the following components:       Result Value    Troponin, High Sensitivity 130 (*)     All other components within normal limits   POCT GLUCOSE - Abnormal; Notable for the following components:    POC Glucose 181 (*)     All other components within normal limits   POCT GLUCOSE - Abnormal; Notable for the following components:    POC Glucose 173 (*)     All other components within normal limits       Background  Allergies:   Allergies   Allergen Reactions    Lisinopril Other (See Comments)     Patient stated \"It messed my kidneys up\"    Iodine Nausea And Vomiting    Iodinated Contrast Media Other (See Comments)     History:   Past Medical History:   Diagnosis Date    Arrhythmia     heart murmur    CAD (coronary artery disease)     hx of stent    Chronic metabolic acidosis     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     CKD (chronic kidney disease) stage 5, GFR less than 15 ml/min (HCC)     Diabetes (HCC)     Hypertension     Iron deficiency anemia     Lumbar spondylosis     Sinus problem        Assessment  Vitals: MEWS Score: 1  Level of Consciousness: Alert (0)   Vitals:    24 1802 24 1904 24 1932 24 2100   BP: (!) 203/94 (!) 205/134 (!) 200/90 (!) 200/85   Pulse: 89 89  75   Resp: 21 17  18   Temp:    98.2 °F (36.8  administration in time range)   polyethylene glycol (GLYCOLAX) packet 17 g (has no administration in time range)   acetaminophen (TYLENOL) tablet 650 mg (has no administration in time range)     Or   acetaminophen (TYLENOL) suppository 650 mg (has no administration in time range)   aspirin EC tablet 325 mg (325 mg Oral Given 3/25/24 1931)   aspirin EC tablet 81 mg (has no administration in time range)   hydrALAZINE (APRESOLINE) injection 10 mg (has no administration in time range)   insulin lispro (HUMALOG) injection vial 0-8 Units (0 Units SubCUTAneous Held 3/25/24 1856)   insulin lispro (HUMALOG) injection vial 0-4 Units ( SubCUTAneous Not Given 3/25/24 2125)   glucose chewable tablet 16 g (has no administration in time range)   dextrose bolus 10% 125 mL (has no administration in time range)     Or   dextrose bolus 10% 250 mL (has no administration in time range)   glucagon injection 1 mg (has no administration in time range)   dextrose 10 % infusion (has no administration in time range)   heparin (porcine) injection 5,000 Units (has no administration in time range)   sodium polystyrene (KAYEXALATE) 15 GM/60ML suspension 15 g (15 g Oral Given 3/25/24 1904)   magnesium sulfate 1000 mg in dextrose 5% 100 mL IVPB (0 mg IntraVENous Stopped 3/25/24 2012)     Last documented pain medication administration:   Pertinent or High Risk Medications/Drips: no   If Yes, please provide details:   Blood Product Administration: no  If Yes, please provide details:   Process Protocols/Bundles:     Recommendation  Incomplete STAT orders:   Overdue Medications:   Patient Belongings:    Additional Comments: Pt was just medicated with his PO nighttime BP medications.   If any further questions, please call Sending RN at [phone]      Admitting Unit Notification  Name of person notified and time: LUIS M Bauer       Electronically signed by: Electronically signed by Elizabeth Lake RN on 3/25/2024 at 9:29 PM

## 2024-03-27 ENCOUNTER — APPOINTMENT (OUTPATIENT)
Facility: HOSPITAL | Age: 59
End: 2024-03-27
Payer: MEDICAID

## 2024-03-27 LAB
25(OH)D3 SERPL-MCNC: 18.3 NG/ML (ref 30–100)
ABO + RH BLD: NORMAL
ALBUMIN SERPL-MCNC: 2.8 G/DL (ref 3.5–5)
ALBUMIN SERPL-MCNC: 2.9 G/DL (ref 3.5–5)
ALBUMIN/GLOB SERPL: 0.7 (ref 1.1–2.2)
ALP SERPL-CCNC: 105 U/L (ref 45–117)
ALT SERPL-CCNC: 20 U/L (ref 12–78)
ANION GAP SERPL CALC-SCNC: 3 MMOL/L (ref 5–15)
ANION GAP SERPL CALC-SCNC: 4 MMOL/L (ref 5–15)
APPEARANCE UR: CLEAR
AST SERPL W P-5'-P-CCNC: 15 U/L (ref 15–37)
BACTERIA URNS QL MICRO: NEGATIVE /HPF
BILIRUB SERPL-MCNC: 0.3 MG/DL (ref 0.2–1)
BILIRUB UR QL: NEGATIVE
BLD PROD TYP BPU: NORMAL
BLOOD BANK BLOOD PRODUCT EXPIRATION DATE: NORMAL
BLOOD BANK DISPENSE STATUS: NORMAL
BLOOD BANK ISBT PRODUCT BLOOD TYPE: 6200
BLOOD BANK PRODUCT CODE: NORMAL
BLOOD BANK UNIT TYPE AND RH: NORMAL
BLOOD GROUP ANTIBODIES SERPL: NEGATIVE
BPU ID: NORMAL
BUN SERPL-MCNC: 55 MG/DL (ref 6–20)
BUN SERPL-MCNC: 55 MG/DL (ref 6–20)
BUN/CREAT SERPL: 10 (ref 12–20)
BUN/CREAT SERPL: 10 (ref 12–20)
CA-I BLD-MCNC: 8 MG/DL (ref 8.5–10.1)
CA-I BLD-MCNC: 8.1 MG/DL (ref 8.5–10.1)
CA-I BLD-MCNC: 8.2 MG/DL (ref 8.5–10.1)
CHLORIDE SERPL-SCNC: 123 MMOL/L (ref 97–108)
CHLORIDE SERPL-SCNC: 123 MMOL/L (ref 97–108)
CO2 SERPL-SCNC: 18 MMOL/L (ref 21–32)
CO2 SERPL-SCNC: 18 MMOL/L (ref 21–32)
COLOR UR: ABNORMAL
CREAT SERPL-MCNC: 5.36 MG/DL (ref 0.7–1.3)
CREAT SERPL-MCNC: 5.45 MG/DL (ref 0.7–1.3)
CREAT UR-MCNC: 90 MG/DL
CROSSMATCH RESULT: NORMAL
ECHO AO ASC DIAM: 2.7 CM
ECHO AO ASCENDING AORTA INDEX: 1.36 CM/M2
ECHO AO ROOT DIAM: 2.9 CM
ECHO AO ROOT INDEX: 1.46 CM/M2
ECHO AV AREA PEAK VELOCITY: 1.4 CM2
ECHO AV AREA VTI: 1.4 CM2
ECHO AV AREA/BSA PEAK VELOCITY: 0.7 CM2/M2
ECHO AV AREA/BSA VTI: 0.7 CM2/M2
ECHO AV MEAN GRADIENT: 6 MMHG
ECHO AV MEAN VELOCITY: 1.1 M/S
ECHO AV PEAK GRADIENT: 10 MMHG
ECHO AV PEAK VELOCITY: 1.6 M/S
ECHO AV VELOCITY RATIO: 0.69
ECHO AV VTI: 32.1 CM
ECHO BSA: 1.95 M2
ECHO EST RA PRESSURE: 3 MMHG
ECHO LA AREA 4C: 21.2 CM2
ECHO LA DIAMETER INDEX: 2.17 CM/M2
ECHO LA DIAMETER: 4.3 CM
ECHO LA MAJOR AXIS: 5.6 CM
ECHO LA TO AORTIC ROOT RATIO: 1.48
ECHO LA VOL MOD A4C: 63 ML (ref 18–58)
ECHO LA VOLUME INDEX MOD A4C: 32 ML/M2 (ref 16–34)
ECHO LV E' LATERAL VELOCITY: 9 CM/S
ECHO LV E' SEPTAL VELOCITY: 9 CM/S
ECHO LV EDV A4C: 141 ML
ECHO LV EDV INDEX A4C: 71 ML/M2
ECHO LV EJECTION FRACTION A4C: 56 %
ECHO LV ESV A4C: 62 ML
ECHO LV ESV INDEX A4C: 31 ML/M2
ECHO LV FRACTIONAL SHORTENING: 30 % (ref 28–44)
ECHO LV INTERNAL DIMENSION DIASTOLE INDEX: 2.37 CM/M2
ECHO LV INTERNAL DIMENSION DIASTOLIC: 4.7 CM (ref 4.2–5.9)
ECHO LV INTERNAL DIMENSION SYSTOLIC INDEX: 1.67 CM/M2
ECHO LV INTERNAL DIMENSION SYSTOLIC: 3.3 CM
ECHO LV IVSD: 1.2 CM (ref 0.6–1)
ECHO LV MASS 2D: 224.8 G (ref 88–224)
ECHO LV MASS INDEX 2D: 113.5 G/M2 (ref 49–115)
ECHO LV POSTERIOR WALL DIASTOLIC: 1.3 CM (ref 0.6–1)
ECHO LV RELATIVE WALL THICKNESS RATIO: 0.55
ECHO LVOT AREA: 2 CM2
ECHO LVOT AV VTI INDEX: 0.7
ECHO LVOT DIAM: 1.6 CM
ECHO LVOT MEAN GRADIENT: 3 MMHG
ECHO LVOT PEAK GRADIENT: 5 MMHG
ECHO LVOT PEAK VELOCITY: 1.1 M/S
ECHO LVOT STROKE VOLUME INDEX: 22.7 ML/M2
ECHO LVOT SV: 45 ML
ECHO LVOT VTI: 22.4 CM
ECHO MV A VELOCITY: 1 M/S
ECHO MV AREA VTI: 1.4 CM2
ECHO MV E DECELERATION TIME (DT): 121 MS
ECHO MV E VELOCITY: 1.18 M/S
ECHO MV E/A RATIO: 1.18
ECHO MV E/E' LATERAL: 13.11
ECHO MV E/E' RATIO (AVERAGED): 13.11
ECHO MV LVOT VTI INDEX: 1.43
ECHO MV MAX VELOCITY: 1.2 M/S
ECHO MV MEAN GRADIENT: 4 MMHG
ECHO MV MEAN VELOCITY: 0.9 M/S
ECHO MV PEAK GRADIENT: 5 MMHG
ECHO MV REGURGITANT PEAK GRADIENT: 52 MMHG
ECHO MV REGURGITANT PEAK VELOCITY: 3.6 M/S
ECHO MV VTI: 32.1 CM
ECHO PV MAX VELOCITY: 0.8 M/S
ECHO PV PEAK GRADIENT: 3 MMHG
ECHO RA AREA 4C: 19 CM2
ECHO RA END SYSTOLIC VOLUME APICAL 4 CHAMBER INDEX BSA: 26 ML/M2
ECHO RA VOLUME: 52 ML
ECHO RIGHT VENTRICULAR SYSTOLIC PRESSURE (RVSP): 39 MMHG
ECHO RV BASAL DIMENSION: 3.9 CM
ECHO RV FREE WALL PEAK S': 12 CM/S
ECHO RV TAPSE: 2.3 CM (ref 1.7–?)
ECHO TV REGURGITANT MAX VELOCITY: 3.01 M/S
ECHO TV REGURGITANT PEAK GRADIENT: 36 MMHG
ERYTHROCYTE [DISTWIDTH] IN BLOOD BY AUTOMATED COUNT: 15.9 % (ref 11.5–14.5)
FERRITIN SERPL-MCNC: 74 NG/ML (ref 26–388)
GLOBULIN SER CALC-MCNC: 4.1 G/DL (ref 2–4)
GLUCOSE BLD STRIP.AUTO-MCNC: 126 MG/DL (ref 65–100)
GLUCOSE BLD STRIP.AUTO-MCNC: 187 MG/DL (ref 65–100)
GLUCOSE BLD STRIP.AUTO-MCNC: 210 MG/DL (ref 65–100)
GLUCOSE BLD STRIP.AUTO-MCNC: 223 MG/DL (ref 65–100)
GLUCOSE SERPL-MCNC: 128 MG/DL (ref 65–100)
GLUCOSE SERPL-MCNC: 129 MG/DL (ref 65–100)
GLUCOSE UR STRIP.AUTO-MCNC: 50 MG/DL
HCT VFR BLD AUTO: 24.4 % (ref 36.6–50.3)
HGB BLD-MCNC: 7.8 G/DL (ref 12.1–17)
HGB UR QL STRIP: NEGATIVE
IRON SATN MFR SERPL: 26 % (ref 20–50)
IRON SERPL-MCNC: 58 UG/DL (ref 35–150)
KETONES UR QL STRIP.AUTO: NEGATIVE MG/DL
LEUKOCYTE ESTERASE UR QL STRIP.AUTO: NEGATIVE
MCH RBC QN AUTO: 28.4 PG (ref 26–34)
MCHC RBC AUTO-ENTMCNC: 32 G/DL (ref 30–36.5)
MCV RBC AUTO: 88.7 FL (ref 80–99)
MUCOUS THREADS URNS QL MICRO: NEGATIVE /LPF
NITRITE UR QL STRIP.AUTO: NEGATIVE
NRBC # BLD: 0 K/UL (ref 0–0.01)
NRBC BLD-RTO: 0 PER 100 WBC
PERFORMED BY:: ABNORMAL
PH UR STRIP: 6 (ref 5–8)
PHOSPHATE SERPL-MCNC: 4.4 MG/DL (ref 2.6–4.7)
PLATELET # BLD AUTO: 145 K/UL (ref 150–400)
PMV BLD AUTO: 10 FL (ref 8.9–12.9)
POTASSIUM SERPL-SCNC: 4.6 MMOL/L (ref 3.5–5.1)
POTASSIUM SERPL-SCNC: 4.6 MMOL/L (ref 3.5–5.1)
PROT SERPL-MCNC: 7 G/DL (ref 6.4–8.2)
PROT UR STRIP-MCNC: >300 MG/DL
PROT UR-MCNC: 367 MG/DL (ref 0–11.9)
PROT/CREAT UR-RTO: 4.1
PTH-INTACT SERPL-MCNC: 478.6 PG/ML (ref 18.4–88)
RBC # BLD AUTO: 2.75 M/UL (ref 4.1–5.7)
RBC #/AREA URNS HPF: NORMAL /HPF (ref 0–5)
SODIUM SERPL-SCNC: 144 MMOL/L (ref 136–145)
SODIUM SERPL-SCNC: 145 MMOL/L (ref 136–145)
SP GR UR REFRACTOMETRY: 1.01 (ref 1–1.03)
SPECIMEN EXP DATE BLD: NORMAL
TIBC SERPL-MCNC: 219 UG/DL (ref 250–450)
TRANSFUSION STATUS PATIENT QL: NORMAL
UNIT DIVISION: 0
UNIT ISSUE DATE/TIME: NORMAL
UROBILINOGEN UR QL STRIP.AUTO: 0.1 EU/DL (ref 0.1–1)
WBC # BLD AUTO: 7 K/UL (ref 4.1–11.1)
WBC URNS QL MICRO: NORMAL /HPF (ref 0–4)

## 2024-03-27 PROCEDURE — 83970 ASSAY OF PARATHORMONE: CPT

## 2024-03-27 PROCEDURE — 83540 ASSAY OF IRON: CPT

## 2024-03-27 PROCEDURE — 6360000002 HC RX W HCPCS

## 2024-03-27 PROCEDURE — 82306 VITAMIN D 25 HYDROXY: CPT

## 2024-03-27 PROCEDURE — 82570 ASSAY OF URINE CREATININE: CPT

## 2024-03-27 PROCEDURE — 85027 COMPLETE CBC AUTOMATED: CPT

## 2024-03-27 PROCEDURE — 93308 TTE F-UP OR LMTD: CPT

## 2024-03-27 PROCEDURE — 2580000003 HC RX 258: Performed by: NURSE PRACTITIONER

## 2024-03-27 PROCEDURE — 80053 COMPREHEN METABOLIC PANEL: CPT

## 2024-03-27 PROCEDURE — P9047 ALBUMIN (HUMAN), 25%, 50ML: HCPCS

## 2024-03-27 PROCEDURE — 82962 GLUCOSE BLOOD TEST: CPT

## 2024-03-27 PROCEDURE — 84156 ASSAY OF PROTEIN URINE: CPT

## 2024-03-27 PROCEDURE — 6370000000 HC RX 637 (ALT 250 FOR IP): Performed by: NURSE PRACTITIONER

## 2024-03-27 PROCEDURE — 6370000000 HC RX 637 (ALT 250 FOR IP): Performed by: INTERNAL MEDICINE

## 2024-03-27 PROCEDURE — 80069 RENAL FUNCTION PANEL: CPT

## 2024-03-27 PROCEDURE — 6360000002 HC RX W HCPCS: Performed by: NURSE PRACTITIONER

## 2024-03-27 PROCEDURE — 82728 ASSAY OF FERRITIN: CPT

## 2024-03-27 PROCEDURE — 81001 URINALYSIS AUTO W/SCOPE: CPT

## 2024-03-27 PROCEDURE — 2060000000 HC ICU INTERMEDIATE R&B

## 2024-03-27 RX ORDER — FUROSEMIDE 10 MG/ML
40 INJECTION INTRAMUSCULAR; INTRAVENOUS ONCE
Status: DISCONTINUED | OUTPATIENT
Start: 2024-03-27 | End: 2024-03-27

## 2024-03-27 RX ORDER — ALBUMIN (HUMAN) 12.5 G/50ML
25 SOLUTION INTRAVENOUS ONCE
Status: COMPLETED | OUTPATIENT
Start: 2024-03-27 | End: 2024-03-27

## 2024-03-27 RX ORDER — FUROSEMIDE 10 MG/ML
40 INJECTION INTRAMUSCULAR; INTRAVENOUS ONCE
Status: COMPLETED | OUTPATIENT
Start: 2024-03-27 | End: 2024-03-27

## 2024-03-27 RX ORDER — CALCITRIOL 0.25 UG/1
1 CAPSULE, LIQUID FILLED ORAL DAILY
Status: DISCONTINUED | OUTPATIENT
Start: 2024-03-28 | End: 2024-03-30 | Stop reason: HOSPADM

## 2024-03-27 RX ORDER — VITAMIN B COMPLEX
2000 TABLET ORAL DAILY
Status: DISCONTINUED | OUTPATIENT
Start: 2024-03-27 | End: 2024-03-30 | Stop reason: HOSPADM

## 2024-03-27 RX ADMIN — ISOSORBIDE DINITRATE 20 MG: 20 TABLET ORAL at 21:25

## 2024-03-27 RX ADMIN — CALCITRIOL CAPSULES 0.25 MCG 0.5 MCG: 0.25 CAPSULE ORAL at 08:21

## 2024-03-27 RX ADMIN — ALBUMIN (HUMAN) 25 G: 0.25 INJECTION, SOLUTION INTRAVENOUS at 16:51

## 2024-03-27 RX ADMIN — HEPARIN SODIUM 5000 UNITS: 5000 INJECTION INTRAVENOUS; SUBCUTANEOUS at 21:30

## 2024-03-27 RX ADMIN — SODIUM CHLORIDE, PRESERVATIVE FREE 10 ML: 5 INJECTION INTRAVENOUS at 08:20

## 2024-03-27 RX ADMIN — SODIUM CHLORIDE, PRESERVATIVE FREE 10 ML: 5 INJECTION INTRAVENOUS at 21:30

## 2024-03-27 RX ADMIN — ALOGLIPTIN 6.25 MG: 6.25 TABLET, FILM COATED ORAL at 08:20

## 2024-03-27 RX ADMIN — FUROSEMIDE 40 MG: 10 INJECTION, SOLUTION INTRAMUSCULAR; INTRAVENOUS at 16:51

## 2024-03-27 RX ADMIN — CARVEDILOL 37.5 MG: 12.5 TABLET, FILM COATED ORAL at 16:51

## 2024-03-27 RX ADMIN — ISOSORBIDE DINITRATE 20 MG: 20 TABLET ORAL at 13:40

## 2024-03-27 RX ADMIN — HEPARIN SODIUM 5000 UNITS: 5000 INJECTION INTRAVENOUS; SUBCUTANEOUS at 13:40

## 2024-03-27 RX ADMIN — Medication 2000 UNITS: at 21:28

## 2024-03-27 RX ADMIN — CARVEDILOL 37.5 MG: 12.5 TABLET, FILM COATED ORAL at 08:21

## 2024-03-27 RX ADMIN — HYDRALAZINE HYDROCHLORIDE 100 MG: 50 TABLET ORAL at 08:21

## 2024-03-27 RX ADMIN — HYDRALAZINE HYDROCHLORIDE 100 MG: 50 TABLET ORAL at 21:24

## 2024-03-27 RX ADMIN — SODIUM BICARBONATE 1300 MG: 650 TABLET ORAL at 11:56

## 2024-03-27 RX ADMIN — ATORVASTATIN CALCIUM 40 MG: 40 TABLET, FILM COATED ORAL at 08:20

## 2024-03-27 RX ADMIN — INSULIN LISPRO 2 UNITS: 100 INJECTION, SOLUTION INTRAVENOUS; SUBCUTANEOUS at 11:57

## 2024-03-27 RX ADMIN — HYDRALAZINE HYDROCHLORIDE 100 MG: 50 TABLET ORAL at 13:40

## 2024-03-27 RX ADMIN — ISOSORBIDE DINITRATE 20 MG: 20 TABLET ORAL at 08:21

## 2024-03-27 RX ADMIN — HEPARIN SODIUM 5000 UNITS: 5000 INJECTION INTRAVENOUS; SUBCUTANEOUS at 05:49

## 2024-03-27 RX ADMIN — SODIUM BICARBONATE 1300 MG: 650 TABLET ORAL at 16:51

## 2024-03-27 RX ADMIN — ASPIRIN 81 MG: 81 TABLET, COATED ORAL at 08:21

## 2024-03-27 RX ADMIN — AMLODIPINE BESYLATE 10 MG: 5 TABLET ORAL at 08:21

## 2024-03-27 RX ADMIN — SODIUM BICARBONATE 1300 MG: 650 TABLET ORAL at 08:21

## 2024-03-27 NOTE — CARE COORDINATION
Patient pending insurance auth at [facility] [employer], Ref number [id number].     CM continues to follow and monitor for needs.

## 2024-03-27 NOTE — PROGRESS NOTES
CARDIOLOGY PROGRESS NOTE      Patient Name: Ayaz Villegas  Age: 58 y.o.  Gender:male  :1965  MRN: [id number]    Patient seen and examined. This is a patient with a history of CKD stage 5, not on dialysis, DM, HTN, HFpEF who presented with chest pain and shortness of breath now being followed for elevated troponin and history of CAD. Resting in bed, laying a little flatter than yesterday.  Breathing better.  Chest pain improved, received 1 unit of blood yesterday. No other complaints reported.    Telemetry reviewed, there were no events noted in the past 24 hours.    Pertinent review of systems items noted above, all other systems are negative. Current medications reviewed.    Physical Examination    Allergies   Allergen Reactions    Lisinopril Other (See Comments)     Patient stated \"It messed my kidneys up\"    Iodine Nausea And Vomiting    Iodinated Contrast Media Other (See Comments)     Vitals:    24 1111   BP: (!) 163/73   Pulse: 72   Resp: 18   Temp: 97.9 °F (36.6 °C)   SpO2: 97%     Vital signs are stable  No apparent distress.  Heart has a regular rate and rhythm.  no murmur  Lungs are clear  Abdomen is soft, nontender, normal bowel sounds.  Extremities have mild edema  Skin is dry and warm.  Normal affect    Labs reviewed:  Recent Results (from the past 12 hour(s))   CBC    Collection Time: 24  5:25 AM   Result Value Ref Range    WBC 7.0 4.1 - 11.1 K/uL    RBC 2.75 (L) 4.10 - 5.70 M/uL    Hemoglobin 7.8 (L) 12.1 - 17.0 g/dL    Hematocrit 24.4 (L) 36.6 - 50.3 %    MCV 88.7 80.0 - 99.0 FL    MCH 28.4 26.0 - 34.0 PG    MCHC 32.0 30.0 - 36.5 g/dL    RDW 15.9 (H) 11.5 - 14.5 %    Platelets 145 (L) 150 - 400 K/uL    MPV 10.0 8.9 - 12.9 FL    Nucleated RBCs 0.0 0.0  WBC    nRBC 0.00 0.00 - 0.01 K/uL   Comprehensive Metabolic Panel    Collection Time: 24  5:25 AM   Result Value Ref Range    Sodium 145 136 - 145 mmol/L    Potassium 4.6 3.5 - 5.1 mmol/L    Chloride 123 (H) 97 -

## 2024-03-27 NOTE — PROGRESS NOTES
Value Ref Range    Sodium 144 136 - 145 mmol/L    Potassium 4.6 3.5 - 5.1 mmol/L    Chloride 123 (H) 97 - 108 mmol/L    CO2 18 (L) 21 - 32 mmol/L    Anion Gap 3 (L) 5 - 15 mmol/L    Glucose 129 (H) 65 - 100 mg/dL    BUN 55 (H) 6 - 20 mg/dL    Creatinine 5.45 (H) 0.70 - 1.30 mg/dL    Bun/Cre Ratio 10 (L) 12 - 20      Est, Glom Filt Rate 11 (L) >60 ml/min/1.73m2    Calcium 8.0 (L) 8.5 - 10.1 mg/dL    Phosphorus 4.4 2.6 - 4.7 mg/dL    Albumin 2.8 (L) 3.5 - 5.0 g/dL   POCT Glucose    Collection Time: 03/27/24  8:19 AM   Result Value Ref Range    POC Glucose 126 (H) 65 - 100 mg/dL    Performed by: GREGORIA ZIEGLER    Echo (TTE) limited (PRN contrast/bubble/strain/3D)    Collection Time: 03/27/24 10:55 AM   Result Value Ref Range    Body Surface Area 1.95 m2    LV EDV A4C 141 mL    LV ESV A4C 62 mL    IVSd 1.2 (A) 0.6 - 1.0 cm    LVIDd 4.7 4.2 - 5.9 cm    LVIDs 3.3 cm    LVOT Diameter 1.6 cm    LVOT Mean Gradient 3 mmHg    LVOT VTI 22.4 cm    LVOT Peak Velocity 1.1 m/s    LVOT Peak Gradient 5 mmHg    LVPWd 1.3 (A) 0.6 - 1.0 cm    LV E' Lateral Velocity 9 cm/s    LV E' Septal Velocity 9 cm/s    LV Ejection Fraction A4C 56 %    LVOT Area 2.0 cm2    LVOT SV 45.0 ml    LA Major Axis 5.6 cm    LA Area 4C 21.2 cm2    LA Volume MOD A4C 63 (A) 18 - 58 mL    LA Diameter 4.3 cm    RA Area 4C 19.0 cm2    RA Volume 52 ml    AV Mean Gradient 6 mmHg    AV VTI 32.1 cm    AV Mean Velocity 1.1 m/s    AV Peak Velocity 1.6 m/s    AV Peak Gradient 10 mmHg    AV Area by VTI 1.4 cm2    AV Area by Peak Velocity 1.4 cm2    Aortic Root 2.9 cm    Ascending Aorta 2.7 cm    MR Peak Velocity 3.6 m/s    MR Peak Gradient 52 mmHg    MV Max Velocity 1.2 m/s    MV Peak Gradient 5 mmHg    MV E Wave Deceleration Time 121.0 ms    MV A Velocity 1.00 m/s    MV E Velocity 1.18 m/s    MV Mean Gradient 4 mmHg    MV VTI 32.1 cm    MV Mean Velocity 0.9 m/s    MV Area by VTI 1.4 cm2    PV Max Velocity 0.8 m/s    PV Peak Gradient 3 mmHg    RV Basal Dimension 3.9 cm     0820    carvedilol (COREG) tablet 37.5 mg, 37.5 mg, Oral, BID WC, Adriana Mayberry APRN - CNP, 37.5 mg at 03/27/24 1651    hydrALAZINE (APRESOLINE) tablet 100 mg, 100 mg, Oral, TID, Adriana Mayberry APRN - CNP, 100 mg at 03/27/24 1340    isosorbide dinitrate (ISORDIL) tablet 20 mg, 20 mg, Oral, TID, Adriana Mayberry APRN - CNP, 20 mg at 03/27/24 1340    alogliptin (NESINA) tablet 6.25 mg, 6.25 mg, Oral, Daily, Adriana Mayberry APRN - CNP, 6.25 mg at 03/27/24 0820    sodium bicarbonate tablet 1,300 mg, 1,300 mg, Oral, TID , Adriana Mayberry APRN - CNP, 1,300 mg at 03/27/24 1651    sodium chloride flush 0.9 % injection 5-40 mL, 5-40 mL, IntraVENous, 2 times per day, Adriana Mayberry APRN - CNP, 10 mL at 03/27/24 0820    sodium chloride flush 0.9 % injection 5-40 mL, 5-40 mL, IntraVENous, PRN, Adriana Mayberry APRN - CNP    0.9 % sodium chloride infusion, , IntraVENous, PRN, Adriana Mayberry APRN - CNP    ondansetron (ZOFRAN-ODT) disintegrating tablet 4 mg, 4 mg, Oral, Q8H PRN **OR** ondansetron (ZOFRAN) injection 4 mg, 4 mg, IntraVENous, Q6H PRN, Adriana Mayberry APRN - CNP    polyethylene glycol (GLYCOLAX) packet 17 g, 17 g, Oral, Daily PRN, Adriana Mayberry APRN - CNP    acetaminophen (TYLENOL) tablet 650 mg, 650 mg, Oral, Q6H PRN **OR** acetaminophen (TYLENOL) suppository 650 mg, 650 mg, Rectal, Q6H PRN, Jefe, Adriana M, APRN - CNP    aspirin EC tablet 81 mg, 81 mg, Oral, Daily, Adriana Mayberry APRN - CNP, 81 mg at 03/27/24 0821    hydrALAZINE (APRESOLINE) injection 10 mg, 10 mg, IntraVENous, Q6H PRN, Adriana Mayberry APRN - CNP    insulin lispro (HUMALOG) injection vial 0-8 Units, 0-8 Units, SubCUTAneous, TID WC, Adriana Mayberry APRN - CNP, 2 Units at 03/27/24 1157    insulin lispro (HUMALOG) injection vial 0-4 Units, 0-4 Units, SubCUTAneous, Nightly, Adriana Mayberry APRN - CNP    glucose chewable tablet 16 g, 4 tablet, Oral, PRN, Adriana Mayberry APRN - CNP    dextrose bolus

## 2024-03-27 NOTE — PROGRESS NOTES
Renal Progress Note    Patient: Ayaz Villegas MRN: [id number]  SSN: [id number]    YOB: 1965  Age: 58 y.o.  Sex: male      Admit Date: 3/25/2024    LOS: 2 days     Subjective:   Patient seen at bedside. Alert and awake, no acute.  Reports that he required supplemental oxygen last night due to shortness of breath and also reports orthopnea.  No swelling in extremities.  Reports no difficulties with urination.    Current Facility-Administered Medications   Medication Dose Route Frequency    albumin human 25% IV solution 25 g  25 g IntraVENous Once    furosemide (LASIX) injection 40 mg  40 mg IntraVENous Once    0.9 % sodium chloride infusion   IntraVENous PRN    epoetin laney-epbx (RETACRIT) injection 10,000 Units  10,000 Units SubCUTAneous Weekly    amLODIPine (NORVASC) tablet 10 mg  10 mg Oral Daily    atorvastatin (LIPITOR) tablet 40 mg  40 mg Oral Daily    calcitRIOL (ROCALTROL) capsule 0.5 mcg  0.5 mcg Oral Daily    carvedilol (COREG) tablet 37.5 mg  37.5 mg Oral BID WC    hydrALAZINE (APRESOLINE) tablet 100 mg  100 mg Oral TID    isosorbide dinitrate (ISORDIL) tablet 20 mg  20 mg Oral TID    alogliptin (NESINA) tablet 6.25 mg  6.25 mg Oral Daily    sodium bicarbonate tablet 1,300 mg  1,300 mg Oral TID WC    sodium chloride flush 0.9 % injection 5-40 mL  5-40 mL IntraVENous 2 times per day    sodium chloride flush 0.9 % injection 5-40 mL  5-40 mL IntraVENous PRN    0.9 % sodium chloride infusion   IntraVENous PRN    ondansetron (ZOFRAN-ODT) disintegrating tablet 4 mg  4 mg Oral Q8H PRN    Or    ondansetron (ZOFRAN) injection 4 mg  4 mg IntraVENous Q6H PRN    polyethylene glycol (GLYCOLAX) packet 17 g  17 g Oral Daily PRN    acetaminophen (TYLENOL) tablet 650 mg  650 mg Oral Q6H PRN    Or    acetaminophen (TYLENOL) suppository 650 mg  650 mg Rectal Q6H PRN    aspirin EC tablet 81 mg  81 mg Oral Daily    hydrALAZINE (APRESOLINE) injection 10 mg  10 mg IntraVENous Q6H PRN    insulin lispro (HUMALOG)  (L) 8.5 - 10.1 mg/dL    Phosphorus 4.4 2.6 - 4.7 mg/dL    Albumin 2.8 (L) 3.5 - 5.0 g/dL   POCT Glucose    Collection Time: 03/27/24  8:19 AM   Result Value Ref Range    POC Glucose 126 (H) 65 - 100 mg/dL    Performed by: GREGORIA ZIEGLER    Echo (TTE) limited (PRN contrast/bubble/strain/3D)    Collection Time: 03/27/24 10:55 AM   Result Value Ref Range    Body Surface Area 1.95 m2    LV EDV A4C 141 mL    LV ESV A4C 62 mL    IVSd 1.2 (A) 0.6 - 1.0 cm    LVIDd 4.7 4.2 - 5.9 cm    LVIDs 3.3 cm    LVOT Diameter 1.6 cm    LVOT Mean Gradient 3 mmHg    LVOT VTI 22.4 cm    LVOT Peak Velocity 1.1 m/s    LVOT Peak Gradient 5 mmHg    LVPWd 1.3 (A) 0.6 - 1.0 cm    LV E' Lateral Velocity 9 cm/s    LV E' Septal Velocity 9 cm/s    LV Ejection Fraction A4C 56 %    LVOT Area 2.0 cm2    LVOT SV 45.0 ml    LA Major Axis 5.6 cm    LA Area 4C 21.2 cm2    LA Volume MOD A4C 63 (A) 18 - 58 mL    LA Diameter 4.3 cm    RA Area 4C 19.0 cm2    RA Volume 52 ml    AV Mean Gradient 6 mmHg    AV VTI 32.1 cm    AV Mean Velocity 1.1 m/s    AV Peak Velocity 1.6 m/s    AV Peak Gradient 10 mmHg    AV Area by VTI 1.4 cm2    AV Area by Peak Velocity 1.4 cm2    Aortic Root 2.9 cm    Ascending Aorta 2.7 cm    MR Peak Velocity 3.6 m/s    MR Peak Gradient 52 mmHg    MV Max Velocity 1.2 m/s    MV Peak Gradient 5 mmHg    MV E Wave Deceleration Time 121.0 ms    MV A Velocity 1.00 m/s    MV E Velocity 1.18 m/s    MV Mean Gradient 4 mmHg    MV VTI 32.1 cm    MV Mean Velocity 0.9 m/s    MV Area by VTI 1.4 cm2    PV Max Velocity 0.8 m/s    PV Peak Gradient 3 mmHg    RV Basal Dimension 3.9 cm    RV Free Wall Peak S' 12 cm/s    TAPSE 2.3 1.7 cm    TR Max Velocity 3.01 m/s    TR Peak Gradient 36 mmHg    Fractional Shortening 2D 30 28 - 44 %    LV ESV Index A4C 31 mL/m2    LV EDV Index A4C 71 mL/m2    LVIDd Index 2.37 cm/m2    LVIDs Index 1.67 cm/m2    LV RWT Ratio 0.55     LV Mass 2D 224.8 (A) 88 - 224 g    LV Mass 2D Index 113.5 49 - 115 g/m2    MV E/A 1.18     E/E'

## 2024-03-27 NOTE — CARE COORDINATION
Chart reviewed, DCP remains for patient to d/c from CM to home self once medically stable.    CM continues to follow and monitor for needs.

## 2024-03-27 NOTE — PROGRESS NOTES
Review of Systems    Physical Exam  Skin:     General: Skin is warm.      Capillary Refill: Capillary refill takes less than 2 seconds.             Comments: Skin intact       Skin assessment noted

## 2024-03-28 ENCOUNTER — APPOINTMENT (OUTPATIENT)
Facility: HOSPITAL | Age: 59
End: 2024-03-28
Payer: MEDICAID

## 2024-03-28 ENCOUNTER — HOSPITAL ENCOUNTER (INPATIENT)
Facility: HOSPITAL | Age: 59
Discharge: HOME | End: 2024-03-30
Payer: MEDICAID

## 2024-03-28 VITALS
BODY MASS INDEX: 20.66 KG/M2 | DIASTOLIC BLOOD PRESSURE: 80 MMHG | SYSTOLIC BLOOD PRESSURE: 172 MMHG | WEIGHT: 161 LBS | HEART RATE: 73 BPM | HEIGHT: 74 IN

## 2024-03-28 LAB
ALBUMIN SERPL-MCNC: 3.1 G/DL (ref 3.5–5)
ANION GAP SERPL CALC-SCNC: 5 MMOL/L (ref 5–15)
BASOPHILS # BLD: 0 K/UL (ref 0–0.1)
BASOPHILS NFR BLD: 0 % (ref 0–1)
BUN SERPL-MCNC: 56 MG/DL (ref 6–20)
BUN/CREAT SERPL: 10 (ref 12–20)
CA-I BLD-MCNC: 8.1 MG/DL (ref 8.5–10.1)
CHLORIDE SERPL-SCNC: 120 MMOL/L (ref 97–108)
CO2 SERPL-SCNC: 20 MMOL/L (ref 21–32)
CREAT SERPL-MCNC: 5.65 MG/DL (ref 0.7–1.3)
DIFFERENTIAL METHOD BLD: ABNORMAL
EOSINOPHIL # BLD: 0.2 K/UL (ref 0–0.4)
EOSINOPHIL NFR BLD: 3 % (ref 0–7)
ERYTHROCYTE [DISTWIDTH] IN BLOOD BY AUTOMATED COUNT: 15.8 % (ref 11.5–14.5)
GLUCOSE BLD STRIP.AUTO-MCNC: 164 MG/DL (ref 65–100)
GLUCOSE BLD STRIP.AUTO-MCNC: 180 MG/DL (ref 65–100)
GLUCOSE BLD STRIP.AUTO-MCNC: 204 MG/DL (ref 65–100)
GLUCOSE SERPL-MCNC: 130 MG/DL (ref 65–100)
HCT VFR BLD AUTO: 22 % (ref 36.6–50.3)
HGB BLD-MCNC: 7.1 G/DL (ref 12.1–17)
IMM GRANULOCYTES # BLD AUTO: 0 K/UL (ref 0–0.04)
IMM GRANULOCYTES NFR BLD AUTO: 0 % (ref 0–0.5)
LYMPHOCYTES # BLD: 1.5 K/UL (ref 0.8–3.5)
LYMPHOCYTES NFR BLD: 21 % (ref 12–49)
MCH RBC QN AUTO: 29 PG (ref 26–34)
MCHC RBC AUTO-ENTMCNC: 32.3 G/DL (ref 30–36.5)
MCV RBC AUTO: 89.8 FL (ref 80–99)
MONOCYTES # BLD: 0.9 K/UL (ref 0–1)
MONOCYTES NFR BLD: 13 % (ref 5–13)
NEUTS SEG # BLD: 4.4 K/UL (ref 1.8–8)
NEUTS SEG NFR BLD: 63 % (ref 32–75)
NRBC # BLD: 0 K/UL (ref 0–0.01)
NRBC BLD-RTO: 0 PER 100 WBC
PERFORMED BY:: ABNORMAL
PHOSPHATE SERPL-MCNC: 4.3 MG/DL (ref 2.6–4.7)
PLATELET # BLD AUTO: 136 K/UL (ref 150–400)
PMV BLD AUTO: 10.6 FL (ref 8.9–12.9)
POTASSIUM SERPL-SCNC: 4.5 MMOL/L (ref 3.5–5.1)
RBC # BLD AUTO: 2.45 M/UL (ref 4.1–5.7)
SODIUM SERPL-SCNC: 145 MMOL/L (ref 136–145)
WBC # BLD AUTO: 7 K/UL (ref 4.1–11.1)

## 2024-03-28 PROCEDURE — 2580000003 HC RX 258: Performed by: NURSE PRACTITIONER

## 2024-03-28 PROCEDURE — 3430000000 HC RX DIAGNOSTIC RADIOPHARMACEUTICAL: Performed by: NURSE PRACTITIONER

## 2024-03-28 PROCEDURE — A9500 TC99M SESTAMIBI: HCPCS | Performed by: NURSE PRACTITIONER

## 2024-03-28 PROCEDURE — 6360000002 HC RX W HCPCS

## 2024-03-28 PROCEDURE — 6360000002 HC RX W HCPCS: Performed by: INTERNAL MEDICINE

## 2024-03-28 PROCEDURE — 80069 RENAL FUNCTION PANEL: CPT

## 2024-03-28 PROCEDURE — 2060000000 HC ICU INTERMEDIATE R&B

## 2024-03-28 PROCEDURE — 82962 GLUCOSE BLOOD TEST: CPT

## 2024-03-28 PROCEDURE — 6360000002 HC RX W HCPCS: Performed by: NURSE PRACTITIONER

## 2024-03-28 PROCEDURE — 78452 HT MUSCLE IMAGE SPECT MULT: CPT

## 2024-03-28 PROCEDURE — P9047 ALBUMIN (HUMAN), 25%, 50ML: HCPCS | Performed by: INTERNAL MEDICINE

## 2024-03-28 PROCEDURE — 6370000000 HC RX 637 (ALT 250 FOR IP): Performed by: NURSE PRACTITIONER

## 2024-03-28 PROCEDURE — 36415 COLL VENOUS BLD VENIPUNCTURE: CPT

## 2024-03-28 PROCEDURE — 85025 COMPLETE CBC W/AUTO DIFF WBC: CPT

## 2024-03-28 PROCEDURE — 6370000000 HC RX 637 (ALT 250 FOR IP): Performed by: INTERNAL MEDICINE

## 2024-03-28 RX ORDER — TETRAKIS(2-METHOXYISOBUTYLISOCYANIDE)COPPER(I) TETRAFLUOROBORATE 1 MG/ML
32.7 INJECTION, POWDER, LYOPHILIZED, FOR SOLUTION INTRAVENOUS
Status: COMPLETED | OUTPATIENT
Start: 2024-03-28 | End: 2024-03-28

## 2024-03-28 RX ORDER — CLONIDINE HYDROCHLORIDE 0.1 MG/1
0.1 TABLET ORAL 3 TIMES DAILY
Status: DISCONTINUED | OUTPATIENT
Start: 2024-03-28 | End: 2024-03-29

## 2024-03-28 RX ORDER — REGADENOSON 0.08 MG/ML
INJECTION, SOLUTION INTRAVENOUS
Status: COMPLETED
Start: 2024-03-28 | End: 2024-03-28

## 2024-03-28 RX ORDER — ALBUMIN (HUMAN) 12.5 G/50ML
25 SOLUTION INTRAVENOUS DAILY
Status: COMPLETED | OUTPATIENT
Start: 2024-03-28 | End: 2024-03-30

## 2024-03-28 RX ORDER — TETRAKIS(2-METHOXYISOBUTYLISOCYANIDE)COPPER(I) TETRAFLUOROBORATE 1 MG/ML
10.07 INJECTION, POWDER, LYOPHILIZED, FOR SOLUTION INTRAVENOUS
Status: COMPLETED | OUTPATIENT
Start: 2024-03-28 | End: 2024-03-28

## 2024-03-28 RX ORDER — BUMETANIDE 0.25 MG/ML
1 INJECTION INTRAMUSCULAR; INTRAVENOUS DAILY
Status: DISCONTINUED | OUTPATIENT
Start: 2024-03-28 | End: 2024-03-30 | Stop reason: HOSPADM

## 2024-03-28 RX ADMIN — CLONIDINE HYDROCHLORIDE 0.1 MG: 0.1 TABLET ORAL at 15:13

## 2024-03-28 RX ADMIN — ALOGLIPTIN 6.25 MG: 6.25 TABLET, FILM COATED ORAL at 11:27

## 2024-03-28 RX ADMIN — ALBUMIN (HUMAN) 25 G: 0.25 INJECTION, SOLUTION INTRAVENOUS at 11:28

## 2024-03-28 RX ADMIN — HYDRALAZINE HYDROCHLORIDE 100 MG: 50 TABLET ORAL at 20:46

## 2024-03-28 RX ADMIN — ISOSORBIDE DINITRATE 20 MG: 20 TABLET ORAL at 11:27

## 2024-03-28 RX ADMIN — ASPIRIN 81 MG: 81 TABLET, COATED ORAL at 11:27

## 2024-03-28 RX ADMIN — HEPARIN SODIUM 5000 UNITS: 5000 INJECTION INTRAVENOUS; SUBCUTANEOUS at 06:00

## 2024-03-28 RX ADMIN — CARVEDILOL 37.5 MG: 12.5 TABLET, FILM COATED ORAL at 16:55

## 2024-03-28 RX ADMIN — TETRAKIS(2-METHOXYISOBUTYLISOCYANIDE)COPPER(I) TETRAFLUOROBORATE 10.07 MILLICURIE: 1 INJECTION, POWDER, LYOPHILIZED, FOR SOLUTION INTRAVENOUS at 08:15

## 2024-03-28 RX ADMIN — BUMETANIDE 1 MG: 0.25 INJECTION INTRAMUSCULAR; INTRAVENOUS at 11:34

## 2024-03-28 RX ADMIN — CARVEDILOL 37.5 MG: 12.5 TABLET, FILM COATED ORAL at 11:27

## 2024-03-28 RX ADMIN — CALCITRIOL CAPSULES 0.25 MCG 1 MCG: 0.25 CAPSULE ORAL at 11:26

## 2024-03-28 RX ADMIN — CLONIDINE HYDROCHLORIDE 0.1 MG: 0.1 TABLET ORAL at 20:46

## 2024-03-28 RX ADMIN — SODIUM BICARBONATE 1300 MG: 650 TABLET ORAL at 11:27

## 2024-03-28 RX ADMIN — ONDANSETRON 4 MG: 4 TABLET, ORALLY DISINTEGRATING ORAL at 05:28

## 2024-03-28 RX ADMIN — EPOETIN ALFA-EPBX 10000 UNITS: 10000 INJECTION, SOLUTION INTRAVENOUS; SUBCUTANEOUS at 17:39

## 2024-03-28 RX ADMIN — HYDRALAZINE HYDROCHLORIDE 100 MG: 50 TABLET ORAL at 11:26

## 2024-03-28 RX ADMIN — ISOSORBIDE DINITRATE 20 MG: 20 TABLET ORAL at 20:46

## 2024-03-28 RX ADMIN — AMLODIPINE BESYLATE 10 MG: 5 TABLET ORAL at 11:27

## 2024-03-28 RX ADMIN — HEPARIN SODIUM 5000 UNITS: 5000 INJECTION INTRAVENOUS; SUBCUTANEOUS at 21:00

## 2024-03-28 RX ADMIN — TETRAKIS(2-METHOXYISOBUTYLISOCYANIDE)COPPER(I) TETRAFLUOROBORATE 32.7 MILLICURIE: 1 INJECTION, POWDER, LYOPHILIZED, FOR SOLUTION INTRAVENOUS at 09:30

## 2024-03-28 RX ADMIN — ATORVASTATIN CALCIUM 40 MG: 40 TABLET, FILM COATED ORAL at 11:27

## 2024-03-28 RX ADMIN — Medication 2000 UNITS: at 11:27

## 2024-03-28 RX ADMIN — SODIUM BICARBONATE 1300 MG: 650 TABLET ORAL at 16:55

## 2024-03-28 RX ADMIN — INSULIN LISPRO 2 UNITS: 100 INJECTION, SOLUTION INTRAVENOUS; SUBCUTANEOUS at 16:55

## 2024-03-28 RX ADMIN — REGADENOSON 0.4 MG: 0.08 INJECTION, SOLUTION INTRAVENOUS at 09:32

## 2024-03-28 RX ADMIN — SODIUM CHLORIDE, PRESERVATIVE FREE 10 ML: 5 INJECTION INTRAVENOUS at 20:46

## 2024-03-28 RX ADMIN — SODIUM CHLORIDE, PRESERVATIVE FREE 10 ML: 5 INJECTION INTRAVENOUS at 11:28

## 2024-03-28 NOTE — PROGRESS NOTES
Renal Progress Note    Patient: Ayaz Villegas MRN: [id number]  SSN: [id number]    YOB: 1965  Age: 58 y.o.  Sex: male      Admit Date: 3/25/2024    LOS: 3 days     Subjective:   Patient seen at bedside. Alert and awake, no acute.  Reports that shortness of breath and orthopnea to be better today  He had stress test this morning.  Results are pending.  No chest pain overnight.  Still having some dyspnea on exertion and shortness of breath.  Good urine output.  GFR is 11%  Blood pressure is elevated      Current Facility-Administered Medications   Medication Dose Route Frequency    bumetanide (BUMEX) injection 1 mg  1 mg IntraVENous Daily    albumin human 25% IV solution 25 g  25 g IntraVENous Daily    cloNIDine (CATAPRES) tablet 0.1 mg  0.1 mg Oral TID    calcitRIOL (ROCALTROL) capsule 1 mcg  1 mcg Oral Daily    Vitamin D (CHOLECALCIFEROL) tablet 2,000 Units  2,000 Units Oral Daily    epoetin laney-epbx (RETACRIT) injection 10,000 Units  10,000 Units SubCUTAneous Once per day on Tue Thu Sat    0.9 % sodium chloride infusion   IntraVENous PRN    amLODIPine (NORVASC) tablet 10 mg  10 mg Oral Daily    atorvastatin (LIPITOR) tablet 40 mg  40 mg Oral Daily    carvedilol (COREG) tablet 37.5 mg  37.5 mg Oral BID WC    hydrALAZINE (APRESOLINE) tablet 100 mg  100 mg Oral TID    isosorbide dinitrate (ISORDIL) tablet 20 mg  20 mg Oral TID    alogliptin (NESINA) tablet 6.25 mg  6.25 mg Oral Daily    sodium bicarbonate tablet 1,300 mg  1,300 mg Oral TID WC    sodium chloride flush 0.9 % injection 5-40 mL  5-40 mL IntraVENous 2 times per day    sodium chloride flush 0.9 % injection 5-40 mL  5-40 mL IntraVENous PRN    0.9 % sodium chloride infusion   IntraVENous PRN    ondansetron (ZOFRAN-ODT) disintegrating tablet 4 mg  4 mg Oral Q8H PRN    Or    ondansetron (ZOFRAN) injection 4 mg  4 mg IntraVENous Q6H PRN    polyethylene glycol (GLYCOLAX) packet 17 g  17 g Oral Daily PRN    acetaminophen (TYLENOL) tablet 650 mg   03/26/24  0648 03/27/24  0525 03/28/24  0450   WBC 5.8 7.0 7.0   HGB 6.7* 7.8* 7.1*   HCT 20.9* 24.4* 22.0*   * 145* 136*     Recent Labs     03/26/24  0648 03/27/24  0525 03/28/24  0450    145  144 145   K 5.2* 4.6  4.6 4.5   * 123*  123* 120*   CO2 19* 18*  18* 20*   GLUCOSE 119* 128*  129* 130*   BUN 54* 55*  55* 56*   CREATININE 5.12* 5.36*  5.45* 5.65*   CALCIUM 7.9* 8.1*  8.2*  8.0* 8.1*   MG 2.1  --   --    PHOS  --  4.4 4.3   LABALBU 2.8* 2.9*  2.8* 3.1*   BILITOT 0.2 0.3  --    AST 14* 15  --    ALT 21 20  --      No results for input(s): \"PHART\", \"JLW9YIK\", \"PO2ART\", \"XIS4ANB\", \"BEART\", \"MAH7ZGYH\", \"HGBART\", \"FJ0ZJZTIM\", \"FIO2A\", \"A2WEDLAZ\", \"OXYHEM\", \"CARBOXHGBART\", \"METHGBART\", \"N1MIPTRRQ\", \"PHCORART\", \"TEMP\" in the last 72 hours.    Invalid input(s): \"IJU0MFLLG\"  Recent Results (from the past 24 hour(s))   POCT Glucose    Collection Time: 03/27/24  4:44 PM   Result Value Ref Range    POC Glucose 187 (H) 65 - 100 mg/dL    Performed by: SARA RUIZ    POCT Glucose    Collection Time: 03/27/24  8:53 PM   Result Value Ref Range    POC Glucose 210 (H) 65 - 100 mg/dL    Performed by: [employer]    Renal Function Panel    Collection Time: 03/28/24  4:50 AM   Result Value Ref Range    Sodium 145 136 - 145 mmol/L    Potassium 4.5 3.5 - 5.1 mmol/L    Chloride 120 (H) 97 - 108 mmol/L    CO2 20 (L) 21 - 32 mmol/L    Anion Gap 5 5 - 15 mmol/L    Glucose 130 (H) 65 - 100 mg/dL    BUN 56 (H) 6 - 20 mg/dL    Creatinine 5.65 (H) 0.70 - 1.30 mg/dL    Bun/Cre Ratio 10 (L) 12 - 20      Est, Glom Filt Rate 11 (L) >60 ml/min/1.73m2    Calcium 8.1 (L) 8.5 - 10.1 mg/dL    Phosphorus 4.3 2.6 - 4.7 mg/dL    Albumin 3.1 (L) 3.5 - 5.0 g/dL   CBC with Auto Differential    Collection Time: 03/28/24  4:50 AM   Result Value Ref Range    WBC 7.0 4.1 - 11.1 K/uL    RBC 2.45 (L) 4.10 - 5.70 M/uL    Hemoglobin 7.1 (L) 12.1 - 17.0 g/dL    Hematocrit 22.0 (L) 36.6 - 50.3 %    MCV 89.8 80.0 - 99.0 FL    [facility]

## 2024-03-28 NOTE — PROGRESS NOTES
CARDIOLOGY PROGRESS NOTE      Patient Name: Ayaz Villegas  Age: 58 y.o.  Gender:male  :1965  MRN: [id number]    Patient seen and examined. This is a patient with a history of CKD stage 5, not on dialysis, DM, HTN, HFpEF who presented with chest pain and shortness of breath now being followed for elevated troponin and history of CAD. Seen during stress testing today, no chest pain overnight.  Continues with some shortness of breath.  No orthopnea.  Good UOP. No other complaints reported.    Telemetry reviewed, there were no events noted in the past 24 hours.    Pertinent review of systems items noted above, all other systems are negative. Current medications reviewed.    Physical Examination    Allergies   Allergen Reactions    Lisinopril Other (See Comments)     Patient stated \"It messed my kidneys up\"    Iodine Nausea And Vomiting    Iodinated Contrast Media Other (See Comments)     Vitals:    24 0303   BP: (!) 159/76   Pulse: 72   Resp: 18   Temp: 98.3 °F (36.8 °C)   SpO2: 97%     Vital signs are stable  No apparent distress.  Heart has a regular rate and rhythm.  no murmur  Lungs are clear  Abdomen is soft, nontender, normal bowel sounds.  Extremities have mild edema  Skin is dry and warm.  Normal affect    Labs reviewed:  Recent Results (from the past 12 hour(s))   Renal Function Panel    Collection Time: 24  4:50 AM   Result Value Ref Range    Sodium 145 136 - 145 mmol/L    Potassium 4.5 3.5 - 5.1 mmol/L    Chloride 120 (H) 97 - 108 mmol/L    CO2 20 (L) 21 - 32 mmol/L    Anion Gap 5 5 - 15 mmol/L    Glucose 130 (H) 65 - 100 mg/dL    BUN 56 (H) 6 - 20 mg/dL    Creatinine 5.65 (H) 0.70 - 1.30 mg/dL    Bun/Cre Ratio 10 (L) 12 - 20      Est, Glom Filt Rate 11 (L) >60 ml/min/1.73m2    Calcium 8.1 (L) 8.5 - 10.1 mg/dL    Phosphorus 4.3 2.6 - 4.7 mg/dL    Albumin 3.1 (L) 3.5 - 5.0 g/dL   CBC with Auto Differential    Collection Time: 24  4:50 AM   Result Value Ref Range    WBC 7.0 4.1 -

## 2024-03-28 NOTE — PROGRESS NOTES
General Daily Progress Note      Patient Name:   Ayaz Villegas       YOB: 1965       Age:  58 y.o.      Admit Date: 3/25/2024      Subjective:       Patient is a 58 y.o. year old male past medical history of CKD stage V diabetes hypertension history of CAD was transferred from outside hospital for chest pain.  Troponin was noted to be 88 and 91.  BUN 56 creatinine 5.06 magnesium 1.4 potassium elevated at 5.3.  Chest x-ray showed no acute process.  Patient seen in ED still complaining of intermittent chest pain more of a pressure pain denies any nausea or vomiting.  Patient to be admitted for further evaluation and treatment.       3/27    Patient doing much better today      Scheduled for stress test tomorrow  Vitamin D levels are 18  Globin 7.8    3/28    Patient scheduled for stress test today results pending    Seen by the nephrology recommend to hold the patient for next 24 hours  Monitor electrolytes        Objective:     Vitals:    03/28/24 1115   BP: (!) 180/79   Pulse: 76   Resp: 20   Temp: 97.9 °F (36.6 °C)   SpO2:         Recent Results (from the past 24 hour(s))   POCT Glucose    Collection Time: 03/27/24  4:44 PM   Result Value Ref Range    POC Glucose 187 (H) 65 - 100 mg/dL    Performed by: SARA RUIZ    POCT Glucose    Collection Time: 03/27/24  8:53 PM   Result Value Ref Range    POC Glucose 210 (H) 65 - 100 mg/dL    Performed by: [employer]    Renal Function Panel    Collection Time: 03/28/24  4:50 AM   Result Value Ref Range    Sodium 145 136 - 145 mmol/L    Potassium 4.5 3.5 - 5.1 mmol/L    Chloride 120 (H) 97 - 108 mmol/L    CO2 20 (L) 21 - 32 mmol/L    Anion Gap 5 5 - 15 mmol/L    Glucose 130 (H) 65 - 100 mg/dL    BUN 56 (H) 6 - 20 mg/dL    Creatinine 5.65 (H) 0.70 - 1.30 mg/dL    Bun/Cre Ratio 10 (L) 12 - 20      Est, Glom Filt Rate 11 (L) >60 ml/min/1.73m2    Calcium 8.1 (L) 8.5 - 10.1 mg/dL    Phosphorus 4.3 2.6 - 4.7 mg/dL    Albumin 3.1 (L) 3.5 - 5.0 g/dL   CBC with

## 2024-03-28 NOTE — PLAN OF CARE
Problem: Discharge Planning  Goal: Discharge to home or other facility with appropriate resources  3/28/2024 0809 by Cecilia Urban, RN  Outcome: Progressing  Flowsheets (Taken 3/28/2024 0755)  Discharge to home or other facility with appropriate resources: Identify barriers to discharge with patient and caregiver  3/28/2024 0220 by Mayi Ortiz, RN  Outcome: Progressing  Flowsheets (Taken 3/27/2024 2020)  Discharge to home or other facility with appropriate resources: Identify barriers to discharge with patient and caregiver     Problem: Pain  Goal: Verbalizes/displays adequate comfort level or baseline comfort level  Outcome: Progressing     Problem: Safety - Adult  Goal: Free from fall injury  Outcome: Progressing     Problem: Chronic Conditions and Co-morbidities  Goal: Patient's chronic conditions and co-morbidity symptoms are monitored and maintained or improved  Outcome: Progressing

## 2024-03-29 LAB
ALBUMIN SERPL-MCNC: 3.1 G/DL (ref 3.5–5)
ANION GAP SERPL CALC-SCNC: 6 MMOL/L (ref 5–15)
BUN SERPL-MCNC: 59 MG/DL (ref 6–20)
BUN/CREAT SERPL: 10 (ref 12–20)
CA-I BLD-MCNC: 7.9 MG/DL (ref 8.5–10.1)
CHLORIDE SERPL-SCNC: 117 MMOL/L (ref 97–108)
CO2 SERPL-SCNC: 20 MMOL/L (ref 21–32)
CREAT SERPL-MCNC: 6.11 MG/DL (ref 0.7–1.3)
ECHO BSA: 1.95 M2
ERYTHROCYTE [DISTWIDTH] IN BLOOD BY AUTOMATED COUNT: 16 % (ref 11.5–14.5)
GLUCOSE BLD STRIP.AUTO-MCNC: 154 MG/DL (ref 65–100)
GLUCOSE BLD STRIP.AUTO-MCNC: 181 MG/DL (ref 65–100)
GLUCOSE BLD STRIP.AUTO-MCNC: 203 MG/DL (ref 65–100)
GLUCOSE BLD STRIP.AUTO-MCNC: 208 MG/DL (ref 65–100)
GLUCOSE SERPL-MCNC: 170 MG/DL (ref 65–100)
HCT VFR BLD AUTO: 22.9 % (ref 36.6–50.3)
HGB BLD-MCNC: 7.3 G/DL (ref 12.1–17)
MCH RBC QN AUTO: 29 PG (ref 26–34)
MCHC RBC AUTO-ENTMCNC: 31.9 G/DL (ref 30–36.5)
MCV RBC AUTO: 90.9 FL (ref 80–99)
NRBC # BLD: 0.03 K/UL (ref 0–0.01)
NRBC BLD-RTO: 0.5 PER 100 WBC
NUC STRESS EJECTION FRACTION: 51 %
PERFORMED BY:: ABNORMAL
PHOSPHATE SERPL-MCNC: 4.6 MG/DL (ref 2.6–4.7)
PLATELET # BLD AUTO: 143 K/UL (ref 150–400)
PMV BLD AUTO: 10.3 FL (ref 8.9–12.9)
POTASSIUM SERPL-SCNC: 4.9 MMOL/L (ref 3.5–5.1)
RBC # BLD AUTO: 2.52 M/UL (ref 4.1–5.7)
SODIUM SERPL-SCNC: 143 MMOL/L (ref 136–145)
STRESS BASELINE DIAS BP: 80 MMHG
STRESS BASELINE HR: 73 BPM
STRESS BASELINE SYS BP: 172 MMHG
STRESS STAGE 1 BP: NORMAL MMHG
STRESS STAGE 1 DURATION: 1 MIN:SEC
STRESS STAGE 1 HR: 76 BPM
STRESS STAGE 2 DURATION: 2 MIN:SEC
STRESS STAGE 2 HR: 76 BPM
STRESS STAGE 3 BP: NORMAL MMHG
STRESS STAGE 3 DURATION: 3 MIN:SEC
STRESS STAGE 3 HR: 76 BPM
STRESS STAGE 4 DURATION: 4 MIN:SEC
STRESS STAGE 4 HR: 76 BPM
STRESS STAGE 5 BP: NORMAL MMHG
STRESS STAGE 5 DURATION: 5 MIN:SEC
STRESS STAGE 5 HR: 76 BPM
STRESS TARGET HR: 160 BPM
WBC # BLD AUTO: 6.6 K/UL (ref 4.1–11.1)

## 2024-03-29 PROCEDURE — P9047 ALBUMIN (HUMAN), 25%, 50ML: HCPCS | Performed by: INTERNAL MEDICINE

## 2024-03-29 PROCEDURE — 2060000000 HC ICU INTERMEDIATE R&B

## 2024-03-29 PROCEDURE — 82962 GLUCOSE BLOOD TEST: CPT

## 2024-03-29 PROCEDURE — 6360000002 HC RX W HCPCS: Performed by: INTERNAL MEDICINE

## 2024-03-29 PROCEDURE — 6370000000 HC RX 637 (ALT 250 FOR IP): Performed by: NURSE PRACTITIONER

## 2024-03-29 PROCEDURE — 6360000002 HC RX W HCPCS: Performed by: NURSE PRACTITIONER

## 2024-03-29 PROCEDURE — 2580000003 HC RX 258: Performed by: NURSE PRACTITIONER

## 2024-03-29 PROCEDURE — 85027 COMPLETE CBC AUTOMATED: CPT

## 2024-03-29 PROCEDURE — 36415 COLL VENOUS BLD VENIPUNCTURE: CPT

## 2024-03-29 PROCEDURE — 6370000000 HC RX 637 (ALT 250 FOR IP): Performed by: INTERNAL MEDICINE

## 2024-03-29 PROCEDURE — 80069 RENAL FUNCTION PANEL: CPT

## 2024-03-29 RX ORDER — CLONIDINE HYDROCHLORIDE 0.1 MG/1
0.2 TABLET ORAL 3 TIMES DAILY
Status: DISCONTINUED | OUTPATIENT
Start: 2024-03-29 | End: 2024-03-30 | Stop reason: HOSPADM

## 2024-03-29 RX ADMIN — AMLODIPINE BESYLATE 10 MG: 5 TABLET ORAL at 09:16

## 2024-03-29 RX ADMIN — ISOSORBIDE DINITRATE 20 MG: 20 TABLET ORAL at 21:53

## 2024-03-29 RX ADMIN — HYDRALAZINE HYDROCHLORIDE 100 MG: 50 TABLET ORAL at 21:53

## 2024-03-29 RX ADMIN — SODIUM CHLORIDE, PRESERVATIVE FREE 10 ML: 5 INJECTION INTRAVENOUS at 12:15

## 2024-03-29 RX ADMIN — SODIUM BICARBONATE 1300 MG: 650 TABLET ORAL at 17:07

## 2024-03-29 RX ADMIN — Medication 2000 UNITS: at 09:24

## 2024-03-29 RX ADMIN — CARVEDILOL 37.5 MG: 12.5 TABLET, FILM COATED ORAL at 17:07

## 2024-03-29 RX ADMIN — CALCITRIOL CAPSULES 0.25 MCG 1 MCG: 0.25 CAPSULE ORAL at 09:16

## 2024-03-29 RX ADMIN — INSULIN LISPRO 2 UNITS: 100 INJECTION, SOLUTION INTRAVENOUS; SUBCUTANEOUS at 12:07

## 2024-03-29 RX ADMIN — SODIUM CHLORIDE, PRESERVATIVE FREE 10 ML: 5 INJECTION INTRAVENOUS at 13:28

## 2024-03-29 RX ADMIN — CARVEDILOL 37.5 MG: 12.5 TABLET, FILM COATED ORAL at 09:15

## 2024-03-29 RX ADMIN — SODIUM BICARBONATE 1300 MG: 650 TABLET ORAL at 09:15

## 2024-03-29 RX ADMIN — ATORVASTATIN CALCIUM 40 MG: 40 TABLET, FILM COATED ORAL at 09:16

## 2024-03-29 RX ADMIN — SODIUM BICARBONATE 1300 MG: 650 TABLET ORAL at 12:21

## 2024-03-29 RX ADMIN — CLONIDINE HYDROCHLORIDE 0.2 MG: 0.1 TABLET ORAL at 13:56

## 2024-03-29 RX ADMIN — HYDRALAZINE HYDROCHLORIDE 100 MG: 50 TABLET ORAL at 09:16

## 2024-03-29 RX ADMIN — CLONIDINE HYDROCHLORIDE 0.1 MG: 0.1 TABLET ORAL at 09:16

## 2024-03-29 RX ADMIN — ALOGLIPTIN 6.25 MG: 6.25 TABLET, FILM COATED ORAL at 09:16

## 2024-03-29 RX ADMIN — ASPIRIN 81 MG: 81 TABLET, COATED ORAL at 09:16

## 2024-03-29 RX ADMIN — HYDRALAZINE HYDROCHLORIDE 100 MG: 50 TABLET ORAL at 13:56

## 2024-03-29 RX ADMIN — ALBUMIN (HUMAN) 25 G: 0.25 INJECTION, SOLUTION INTRAVENOUS at 12:15

## 2024-03-29 RX ADMIN — BUMETANIDE 1 MG: 0.25 INJECTION INTRAMUSCULAR; INTRAVENOUS at 12:15

## 2024-03-29 RX ADMIN — CLONIDINE HYDROCHLORIDE 0.2 MG: 0.1 TABLET ORAL at 21:53

## 2024-03-29 RX ADMIN — INSULIN LISPRO 2 UNITS: 100 INJECTION, SOLUTION INTRAVENOUS; SUBCUTANEOUS at 17:07

## 2024-03-29 RX ADMIN — SODIUM CHLORIDE, PRESERVATIVE FREE 10 ML: 5 INJECTION INTRAVENOUS at 21:53

## 2024-03-29 RX ADMIN — HEPARIN SODIUM 5000 UNITS: 5000 INJECTION INTRAVENOUS; SUBCUTANEOUS at 21:53

## 2024-03-29 RX ADMIN — ISOSORBIDE DINITRATE 20 MG: 20 TABLET ORAL at 09:15

## 2024-03-29 RX ADMIN — HEPARIN SODIUM 5000 UNITS: 5000 INJECTION INTRAVENOUS; SUBCUTANEOUS at 05:48

## 2024-03-29 RX ADMIN — ISOSORBIDE DINITRATE 20 MG: 20 TABLET ORAL at 13:57

## 2024-03-29 NOTE — PROGRESS NOTES
General Daily Progress Note      Patient Name:   Ayaz Villegas       YOB: 1965       Age:  58 y.o.      Admit Date: 3/25/2024      Subjective:       Patient is a 58 y.o. year old male past medical history of CKD stage V diabetes hypertension history of CAD was transferred from outside hospital for chest pain.  Troponin was noted to be 88 and 91.  BUN 56 creatinine 5.06 magnesium 1.4 potassium elevated at 5.3.  Chest x-ray showed no acute process.  Patient seen in ED still complaining of intermittent chest pain more of a pressure pain denies any nausea or vomiting.  Patient to be admitted for further evaluation and treatment.       3/27    Patient doing much better today      Scheduled for stress test tomorrow  Vitamin D levels are 18  Globin 7.8    3/28    Patient scheduled for stress test today results pending    Seen by the nephrology recommend to hold the patient for next 24 hours  Monitor electrolytes    3/29    Nephrology - good urine output. GFR 11% yesterday     CM - patient to d/c from CM to home self once stable     Morning labs: glucose 181, H/H 7.3/22.9, platelets 143    Pt reports intermittent pressure-like sensation on his chest. He has had some gurgling of his stomach which he thinks is associated with GERD. Other than that, he is doing alright and ate some of his breakfast this AM.         Objective:     Vitals:    03/29/24 0841   BP: (!) 163/80   Pulse: 69   Resp: 18   Temp: 98.1 °F (36.7 °C)   SpO2: 95%        Recent Results (from the past 24 hour(s))   Nuclear stress test with myocardial perfusion    Collection Time: 03/28/24 11:14 AM   Result Value Ref Range    Stress Target  bpm    Baseline HR 73 bpm    Baseline Systolic  mmHg    Baseline Diastolic BP 80 mmHg    Stress Stage 1 Duration 1 min:sec    Stress Stage 1 HR 76 bpm    Stress Stage 1 /54 mmHg    Stress Stage 2 Duration 2 min:sec    Stress Stage 2 HR 76 bpm    Stress Stage 3 Duration 3 min:sec    Stress  Value Ref Range    POC Glucose 181 (H) 65 - 100 mg/dL    Performed by: Yohan Velazquez        Results       ** No results found for the last 336 hours. **             XR CHEST PORTABLE    Result Date: 3/25/2024  No acute process.         Labs:     Recent Labs     03/28/24  0450 03/29/24  0819   WBC 7.0 6.6   HGB 7.1* 7.3*   HCT 22.0* 22.9*   * 143*       Recent Labs     03/27/24  0525 03/28/24  0450     144 145   K 4.6  4.6 4.5   *  123* 120*   CO2 18*  18* 20*   BUN 55*  55* 56*   CREATININE 5.36*  5.45* 5.65*   GLUCOSE 128*  129* 130*   CALCIUM 8.1*  8.2*  8.0* 8.1*   PHOS 4.4 4.3       Recent Labs     03/27/24  0525 03/28/24  0450   AST 15  --    ALT 20  --    ALKPHOS 105  --    BILITOT 0.3  --    LABALBU 2.9*  2.8* 3.1*   GLOB 4.1*  --        No results for input(s): \"INR\", \"PROTIME\", \"APTT\" in the last 72 hours.   Recent Labs     03/27/24  0525   IRON 58   TIBC 219*   FERRITIN 74        Lab Results   Component Value Date/Time    BLOKAPMA51 623 01/09/2024 10:35 AM    FOLATE 16.3 01/09/2024 10:35 AM      No results for input(s): \"PH\", \"PCO2\", \"PO2\" in the last 72 hours.  Recent Labs     03/26/24  1130   TROPHS 99*       Lab Results   Component Value Date/Time    CHOL 165 03/26/2024 06:48 AM    TRIG 92 03/26/2024 06:48 AM    HDL 42 03/26/2024 06:48 AM    LDLCALC 104.6 03/26/2024 06:48 AM    CHOLHDLRATIO 3.9 03/26/2024 06:48 AM     Lab Results   Component Value Date/Time    POCGLU 181 03/29/2024 08:42 AM    POCGLU 180 03/28/2024 08:11 PM    POCGLU 204 03/28/2024 03:53 PM    POCGLU 164 03/28/2024 11:25 AM    POCGLU 210 03/27/2024 08:53 PM     Lab Results   Component Value Date/Time    COLORU Yellow/Straw 03/27/2024 02:45 AM    CLARITYU Clear 06/27/2022 09:10 AM    GLUCOSEU 50 03/27/2024 02:45 AM    BILIRUBINUR Negative 03/27/2024 02:45 AM    BILIRUBINUR Negative 06/27/2022 09:10 AM    KETUA Negative 03/27/2024 02:45 AM    SPECGRAV 1.011 03/27/2024 02:45 AM    BLOODU Negative 03/27/2024

## 2024-03-29 NOTE — PROGRESS NOTES
Spiritual Care Assessment/Progress Note  [facility]    Name: Ayaz Villegas MRN: [id number]    Age: 58 y.o.     Sex: male   Language: English     Date: 3/29/2024            Total Time Calculated: 49 min              Spiritual Assessment begun in [facility] CARDIAC TELEMETRY  Service Provided For:: Patient  Referral/Consult From:: Rounding  Encounter Overview/Reason : Initial Encounter    Spiritual beliefs:      [x] Involved in a sallie tradition/spiritual practice: Roman Catholic      [x] Supported by a sallie community: Yes     [] Claims no spiritual orientation:      [] Seeking spiritual identity:           [] Adheres to an individual form of spirituality:      [] Not able to assess:                Identified resources for coping and support system:   Support System: Druze/sallie community       [x] Prayer                  [x] Devotional reading               [x] Music                  [] Guided Imagery     [] Pet visits                                        [] Other: (COMMENT)     Specific area/focus of visit   Encounter:    Crisis:    Spiritual/Emotional needs: Type: Spiritual Support  Ritual, Rites and Sacraments:    Grief, Loss, and Adjustments:    Ethics/Mediation:    Behavioral Health:    Palliative Care:    Advance Care Planning:      Plan/Referrals: Other (Comment) ( is available if needed)    Narrative: SA- Rounding visit in [facility]. Patient was present during the visit.  visited the patient if he had any spiritual/emotional needs during this hospitalization. The patient expressed his peaceful and grateful heart for God and his sallie community. The patient shared about his spiritual journey and life review.  provided a supportive presence with an active listening. Patient and  also discussed how the patient's sallie impacted to his current medical condition/treatment journey. The patient shared how he soumya and maintain positively in the challenging or

## 2024-03-29 NOTE — CARE COORDINATION
Chart reviewed, DCP remains for patient to return home self once medically stable.     CM continues to follow and monitor for needs.

## 2024-03-29 NOTE — PROGRESS NOTES
CARDIOLOGY PROGRESS NOTE      Patient Name: Ayaz Villegas  Age: 58 y.o.  Gender:male  :1965  MRN: [id number]    Patient seen and examined. This is a patient with a history of CKD stage 5, not on dialysis, DM, HTN, HFpEF who presented with chest pain and shortness of breath now being followed for elevated troponin and history of CAD. Seen sitting upright in bed, on the phone with his sister. No further chest pain overnight. Continues with some shortness of breath.  No orthopnea. Good UOP. No other complaints reported.    Telemetry reviewed, there were no events noted in the past 24 hours.    Pertinent review of systems items noted above, all other systems are negative. Current medications reviewed.    Physical Examination    Allergies   Allergen Reactions    Lisinopril Other (See Comments)     Patient stated \"It messed my kidneys up\"    Iodine Nausea And Vomiting    Iodinated Contrast Media Other (See Comments)     Vitals:    24 1157   BP: (!) 150/71   Pulse: 73   Resp: 18   Temp: 98.2 °F (36.8 °C)   SpO2: 96%     Vital signs are stable  No apparent distress.  Heart has a regular rate and rhythm.  no murmur  Lungs are clear  Abdomen is soft, nontender, normal bowel sounds.  Extremities have mild edema  Skin is dry and warm.  Normal affect    Labs reviewed:  Recent Results (from the past 12 hour(s))   CBC    Collection Time: 24  8:19 AM   Result Value Ref Range    WBC 6.6 4.1 - 11.1 K/uL    RBC 2.52 (L) 4.10 - 5.70 M/uL    Hemoglobin 7.3 (L) 12.1 - 17.0 g/dL    Hematocrit 22.9 (L) 36.6 - 50.3 %    MCV 90.9 80.0 - 99.0 FL    MCH 29.0 26.0 - 34.0 PG    MCHC 31.9 30.0 - 36.5 g/dL    RDW 16.0 (H) 11.5 - 14.5 %    Platelets 143 (L) 150 - 400 K/uL    MPV 10.3 8.9 - 12.9 FL    Nucleated RBCs 0.5 (H) 0.0  WBC    nRBC 0.03 (H) 0.00 - 0.01 K/uL   Renal Function Panel    Collection Time: 24  8:19 AM   Result Value Ref Range    Sodium 143 136 - 145 mmol/L    Potassium 4.9 3.5 - 5.1 mmol/L

## 2024-03-29 NOTE — PROGRESS NOTES
by: WARRENR Training    CBC    Collection Time: 03/29/24  8:19 AM   Result Value Ref Range    WBC 6.6 4.1 - 11.1 K/uL    RBC 2.52 (L) 4.10 - 5.70 M/uL    Hemoglobin 7.3 (L) 12.1 - 17.0 g/dL    Hematocrit 22.9 (L) 36.6 - 50.3 %    MCV 90.9 80.0 - 99.0 FL    MCH 29.0 26.0 - 34.0 PG    MCHC 31.9 30.0 - 36.5 g/dL    RDW 16.0 (H) 11.5 - 14.5 %    Platelets 143 (L) 150 - 400 K/uL    MPV 10.3 8.9 - 12.9 FL    Nucleated RBCs 0.5 (H) 0.0  WBC    nRBC 0.03 (H) 0.00 - 0.01 K/uL   Renal Function Panel    Collection Time: 03/29/24  8:19 AM   Result Value Ref Range    Sodium 143 136 - 145 mmol/L    Potassium 4.9 3.5 - 5.1 mmol/L    Chloride 117 (H) 97 - 108 mmol/L    CO2 20 (L) 21 - 32 mmol/L    Anion Gap 6 5 - 15 mmol/L    Glucose 170 (H) 65 - 100 mg/dL    BUN 59 (H) 6 - 20 mg/dL    Creatinine 6.11 (H) 0.70 - 1.30 mg/dL    Bun/Cre Ratio 10 (L) 12 - 20      Est, Glom Filt Rate 10 (L) >60 ml/min/1.73m2    Calcium 7.9 (L) 8.5 - 10.1 mg/dL    Phosphorus 4.6 2.6 - 4.7 mg/dL    Albumin 3.1 (L) 3.5 - 5.0 g/dL   POCT Glucose    Collection Time: 03/29/24  8:42 AM   Result Value Ref Range    POC Glucose 181 (H) 65 - 100 mg/dL    Performed by: Yohan Velazquez        Results       ** No results found for the last 336 hours. **             XR CHEST PORTABLE    Result Date: 3/25/2024  No acute process.         Labs:     Recent Labs     03/28/24  0450 03/29/24  0819   WBC 7.0 6.6   HGB 7.1* 7.3*   HCT 22.0* 22.9*   * 143*       Recent Labs     03/27/24  0525 03/28/24  0450 03/29/24  0819     144 145 143   K 4.6  4.6 4.5 4.9   *  123* 120* 117*   CO2 18*  18* 20* 20*   BUN 55*  55* 56* 59*   CREATININE 5.36*  5.45* 5.65* 6.11*   GLUCOSE 128*  129* 130* 170*   CALCIUM 8.1*  8.2*  8.0* 8.1* 7.9*   PHOS 4.4 4.3 4.6       Recent Labs     03/27/24  0525 03/28/24  0450 03/29/24  0819   AST 15  --   --    ALT 20  --   --    ALKPHOS 105  --   --    BILITOT 0.3  --   --    LABALBU 2.9*  2.8* 3.1* 3.1*   GLOB 4.1*  --    and Saturday  Heparin 5000 units SQ every 8 hours  Hydralazine 100 mg 3 times a day  Isosorbide dinitrate 20 mg 3 times a day  Sodium bicarb 1500 mg 3 times a day  Vitamin D 2000 units daily  Sliding scale insulin     Possible discharge in next 24 hours        Current Facility-Administered Medications:     cloNIDine (CATAPRES) tablet 0.2 mg, 0.2 mg, Oral, TID, Ellen Ramos MD    bumetanide (BUMEX) injection 1 mg, 1 mg, IntraVENous, Daily, Ellen Ramos MD, 1 mg at 03/28/24 1134    albumin human 25% IV solution 25 g, 25 g, IntraVENous, Daily, Ellen Ramos MD, Stopped at 03/28/24 1250    calcitRIOL (ROCALTROL) capsule 1 mcg, 1 mcg, Oral, Daily, Ellen Ramos MD, 1 mcg at 03/29/24 0916    Vitamin D (CHOLECALCIFEROL) tablet 2,000 Units, 2,000 Units, Oral, Daily, Ellen Ramos MD, 2,000 Units at 03/29/24 0924    epoetin laney-epbx (RETACRIT) injection 10,000 Units, 10,000 Units, SubCUTAneous, Once per day on Tue Thu Sat, Ellen Ramos MD, 10,000 Units at 03/28/24 1739    0.9 % sodium chloride infusion, , IntraVENous, PRN, Nicola Cao MD    amLODIPine (NORVASC) tablet 10 mg, 10 mg, Oral, Daily, Adriana Mayberry APRN - CNP, 10 mg at 03/29/24 0916    atorvastatin (LIPITOR) tablet 40 mg, 40 mg, Oral, Daily, Adriana Mayberry APRN - CNP, 40 mg at 03/29/24 0916    carvedilol (COREG) tablet 37.5 mg, 37.5 mg, Oral, BID WC, Adriana Mayberry APRN - CNP, 37.5 mg at 03/29/24 0915    hydrALAZINE (APRESOLINE) tablet 100 mg, 100 mg, Oral, TID, Adriana Mayberry APRN - CNP, 100 mg at 03/29/24 0916    isosorbide dinitrate (ISORDIL) tablet 20 mg, 20 mg, Oral, TID, Adriana Mayberry APRN - CNP, 20 mg at 03/29/24 0915    alogliptin (NESINA) tablet 6.25 mg, 6.25 mg, Oral, Daily, Adriana Mayberry APRN - CNP, 6.25 mg at 03/29/24 0916    sodium bicarbonate tablet 1,300 mg, 1,300 mg, Oral, TID WC, Adriana Mayberry APRN - CNP, 1,300 mg at 03/29/24 0915    sodium chloride flush 0.9 % injection 5-40 mL, 5-40 mL, IntraVENous,

## 2024-03-29 NOTE — PLAN OF CARE
Problem: Discharge Planning  Goal: Discharge to home or other facility with appropriate resources  3/29/2024 1107 by Nela Corrigan, RN  Outcome: Progressing  3/29/2024 0055 by Mayi Ortiz, RN  Outcome: Progressing  Flowsheets (Taken 3/28/2024 2025)  Discharge to home or other facility with appropriate resources: Identify barriers to discharge with patient and caregiver     Problem: Pain  Goal: Verbalizes/displays adequate comfort level or baseline comfort level  Outcome: Progressing     Problem: Safety - Adult  Goal: Free from fall injury  Outcome: Progressing     Problem: Chronic Conditions and Co-morbidities  Goal: Patient's chronic conditions and co-morbidity symptoms are monitored and maintained or improved  Outcome: Progressing

## 2024-03-29 NOTE — PROGRESS NOTES
Renal Progress Note    Patient: Ayaz Villegas MRN: [id number]  SSN: [id number]    YOB: 1965  Age: 58 y.o.  Sex: male      Admit Date: 3/25/2024    LOS: 4 days     Subjective:   Patient seen at bedside. Alert and awake, no acute.  He is still having occasional orthopnea and PND   he had stress test yesterday.  Results are pending.  No chest pain overnight.  Current GFR is only 10%   blood pressure is elevated  I had lengthy discussion with patient regarding starting dialysis with his current GFR and CKD 5 for the past 1 year.  He asked multiple questions which were answered. He wants to wait for another few weeks!      Current Facility-Administered Medications   Medication Dose Route Frequency    cloNIDine (CATAPRES) tablet 0.2 mg  0.2 mg Oral TID    bumetanide (BUMEX) injection 1 mg  1 mg IntraVENous Daily    albumin human 25% IV solution 25 g  25 g IntraVENous Daily    calcitRIOL (ROCALTROL) capsule 1 mcg  1 mcg Oral Daily    Vitamin D (CHOLECALCIFEROL) tablet 2,000 Units  2,000 Units Oral Daily    epoetin laney-epbx (RETACRIT) injection 10,000 Units  10,000 Units SubCUTAneous Once per day on Tue Thu Sat    0.9 % sodium chloride infusion   IntraVENous PRN    amLODIPine (NORVASC) tablet 10 mg  10 mg Oral Daily    atorvastatin (LIPITOR) tablet 40 mg  40 mg Oral Daily    carvedilol (COREG) tablet 37.5 mg  37.5 mg Oral BID WC    hydrALAZINE (APRESOLINE) tablet 100 mg  100 mg Oral TID    isosorbide dinitrate (ISORDIL) tablet 20 mg  20 mg Oral TID    alogliptin (NESINA) tablet 6.25 mg  6.25 mg Oral Daily    sodium bicarbonate tablet 1,300 mg  1,300 mg Oral TID WC    sodium chloride flush 0.9 % injection 5-40 mL  5-40 mL IntraVENous 2 times per day    sodium chloride flush 0.9 % injection 5-40 mL  5-40 mL IntraVENous PRN    0.9 % sodium chloride infusion   IntraVENous PRN    ondansetron (ZOFRAN-ODT) disintegrating tablet 4 mg  4 mg Oral Q8H PRN    Or    ondansetron (ZOFRAN) injection 4 mg  4 mg IntraVENous

## 2024-03-30 VITALS
RESPIRATION RATE: 18 BRPM | OXYGEN SATURATION: 98 % | TEMPERATURE: 98.1 F | SYSTOLIC BLOOD PRESSURE: 149 MMHG | HEIGHT: 74 IN | HEART RATE: 67 BPM | WEIGHT: 169.75 LBS | DIASTOLIC BLOOD PRESSURE: 69 MMHG | BODY MASS INDEX: 21.79 KG/M2

## 2024-03-30 LAB
ALBUMIN SERPL-MCNC: 3.2 G/DL (ref 3.5–5)
ANION GAP SERPL CALC-SCNC: 7 MMOL/L (ref 5–15)
BUN SERPL-MCNC: 65 MG/DL (ref 6–20)
BUN/CREAT SERPL: 10 (ref 12–20)
CA-I BLD-MCNC: 8.2 MG/DL (ref 8.5–10.1)
CHLORIDE SERPL-SCNC: 111 MMOL/L (ref 97–108)
CO2 SERPL-SCNC: 22 MMOL/L (ref 21–32)
CREAT SERPL-MCNC: 6.24 MG/DL (ref 0.7–1.3)
GLUCOSE BLD STRIP.AUTO-MCNC: 223 MG/DL (ref 65–100)
GLUCOSE BLD STRIP.AUTO-MCNC: 278 MG/DL (ref 65–100)
GLUCOSE SERPL-MCNC: 207 MG/DL (ref 65–100)
PERFORMED BY:: ABNORMAL
PERFORMED BY:: ABNORMAL
PHOSPHATE SERPL-MCNC: 4.4 MG/DL (ref 2.6–4.7)
POTASSIUM SERPL-SCNC: 4.3 MMOL/L (ref 3.5–5.1)
SODIUM SERPL-SCNC: 140 MMOL/L (ref 136–145)

## 2024-03-30 PROCEDURE — P9047 ALBUMIN (HUMAN), 25%, 50ML: HCPCS | Performed by: INTERNAL MEDICINE

## 2024-03-30 PROCEDURE — 6370000000 HC RX 637 (ALT 250 FOR IP): Performed by: INTERNAL MEDICINE

## 2024-03-30 PROCEDURE — 6370000000 HC RX 637 (ALT 250 FOR IP): Performed by: NURSE PRACTITIONER

## 2024-03-30 PROCEDURE — 2580000003 HC RX 258: Performed by: NURSE PRACTITIONER

## 2024-03-30 PROCEDURE — 80069 RENAL FUNCTION PANEL: CPT

## 2024-03-30 PROCEDURE — 6360000002 HC RX W HCPCS: Performed by: INTERNAL MEDICINE

## 2024-03-30 PROCEDURE — 6360000002 HC RX W HCPCS: Performed by: NURSE PRACTITIONER

## 2024-03-30 PROCEDURE — 82962 GLUCOSE BLOOD TEST: CPT

## 2024-03-30 PROCEDURE — 36415 COLL VENOUS BLD VENIPUNCTURE: CPT

## 2024-03-30 RX ORDER — CALCITRIOL 0.25 UG/1
0.5 CAPSULE, LIQUID FILLED ORAL DAILY
Qty: 30 CAPSULE | Refills: 3 | Status: SHIPPED | OUTPATIENT
Start: 2024-03-30

## 2024-03-30 RX ORDER — GLIPIZIDE 5 MG/1
5 TABLET ORAL 2 TIMES DAILY
Qty: 60 TABLET | Refills: 0 | Status: SHIPPED | OUTPATIENT
Start: 2024-03-30

## 2024-03-30 RX ORDER — SODIUM BICARBONATE 650 MG/1
1300 TABLET ORAL
Qty: 90 TABLET | Refills: 0 | Status: SHIPPED | OUTPATIENT
Start: 2024-03-30

## 2024-03-30 RX ORDER — ATORVASTATIN CALCIUM 40 MG/1
40 TABLET, FILM COATED ORAL DAILY
Qty: 30 TABLET | Refills: 0 | Status: SHIPPED | OUTPATIENT
Start: 2024-03-30

## 2024-03-30 RX ORDER — CARVEDILOL 25 MG/1
37.5 TABLET ORAL 2 TIMES DAILY WITH MEALS
Qty: 60 TABLET | Refills: 0 | Status: SHIPPED | OUTPATIENT
Start: 2024-03-30

## 2024-03-30 RX ORDER — CLONIDINE HYDROCHLORIDE 0.1 MG/1
0.2 TABLET ORAL 3 TIMES DAILY
Qty: 60 TABLET | Refills: 3 | Status: SHIPPED | OUTPATIENT
Start: 2024-03-30

## 2024-03-30 RX ORDER — POLYETHYLENE GLYCOL 3350 17 G/17G
17 POWDER, FOR SOLUTION ORAL DAILY PRN
Qty: 30 PACKET | Refills: 0 | Status: SHIPPED | OUTPATIENT
Start: 2024-03-30 | End: 2024-04-29

## 2024-03-30 RX ORDER — ASPIRIN 81 MG/1
81 TABLET ORAL DAILY
Qty: 30 TABLET | Refills: 3 | Status: SHIPPED | OUTPATIENT
Start: 2024-03-31

## 2024-03-30 RX ORDER — AMLODIPINE BESYLATE 10 MG/1
10 TABLET ORAL DAILY
Qty: 30 TABLET | Refills: 0 | Status: SHIPPED | OUTPATIENT
Start: 2024-03-30

## 2024-03-30 RX ORDER — ISOSORBIDE DINITRATE 20 MG/1
20 TABLET ORAL 3 TIMES DAILY
Qty: 90 TABLET | Refills: 0 | Status: SHIPPED | OUTPATIENT
Start: 2024-03-30

## 2024-03-30 RX ORDER — CARVEDILOL 25 MG/1
25 TABLET ORAL 2 TIMES DAILY WITH MEALS
Qty: 60 TABLET | Refills: 3 | Status: SHIPPED | OUTPATIENT
Start: 2024-03-30

## 2024-03-30 RX ORDER — ASPIRIN 81 MG/1
81 TABLET ORAL DAILY
Qty: 90 TABLET | Refills: 0 | Status: SHIPPED | OUTPATIENT
Start: 2024-03-30

## 2024-03-30 RX ORDER — HYDRALAZINE HYDROCHLORIDE 25 MG/1
100 TABLET, FILM COATED ORAL 3 TIMES DAILY
Qty: 90 TABLET | Refills: 3 | Status: SHIPPED | OUTPATIENT
Start: 2024-03-30

## 2024-03-30 RX ORDER — CLONIDINE HYDROCHLORIDE 0.2 MG/1
0.2 TABLET ORAL 3 TIMES DAILY
Qty: 60 TABLET | Refills: 3 | Status: SHIPPED | OUTPATIENT
Start: 2024-03-30

## 2024-03-30 RX ORDER — CHOLECALCIFEROL (VITAMIN D3) 50 MCG
2000 TABLET ORAL DAILY
Qty: 60 TABLET | Refills: 0 | Status: SHIPPED | OUTPATIENT
Start: 2024-03-31

## 2024-03-30 RX ORDER — GLIPIZIDE 5 MG/1
5 TABLET ORAL 2 TIMES DAILY
Qty: 60 TABLET | Refills: 3 | Status: SHIPPED | OUTPATIENT
Start: 2024-03-30 | End: 2024-03-30

## 2024-03-30 RX ORDER — ATORVASTATIN CALCIUM 40 MG/1
40 TABLET, FILM COATED ORAL DAILY
Qty: 30 TABLET | Refills: 3 | Status: SHIPPED | OUTPATIENT
Start: 2024-03-30

## 2024-03-30 RX ORDER — CALCITRIOL 0.5 UG/1
0.5 CAPSULE, LIQUID FILLED ORAL DAILY
Qty: 30 CAPSULE | Refills: 0 | Status: SHIPPED | OUTPATIENT
Start: 2024-03-30

## 2024-03-30 RX ORDER — AMLODIPINE BESYLATE 10 MG/1
10 TABLET ORAL DAILY
Qty: 90 TABLET | Refills: 0 | Status: SHIPPED | OUTPATIENT
Start: 2024-03-30

## 2024-03-30 RX ORDER — HYDRALAZINE HYDROCHLORIDE 100 MG/1
100 TABLET, FILM COATED ORAL 3 TIMES DAILY
Qty: 90 TABLET | Refills: 0 | Status: SHIPPED | OUTPATIENT
Start: 2024-03-30

## 2024-03-30 RX ORDER — GLIPIZIDE 5 MG/1
5 TABLET ORAL 2 TIMES DAILY
Qty: 60 TABLET | Refills: 3 | Status: SHIPPED | OUTPATIENT
Start: 2024-03-30

## 2024-03-30 RX ORDER — ISOSORBIDE DINITRATE 5 MG/1
20 TABLET ORAL 3 TIMES DAILY
Qty: 90 TABLET | Refills: 3 | Status: SHIPPED | OUTPATIENT
Start: 2024-03-30

## 2024-03-30 RX ADMIN — CLONIDINE HYDROCHLORIDE 0.2 MG: 0.1 TABLET ORAL at 09:31

## 2024-03-30 RX ADMIN — HEPARIN SODIUM 5000 UNITS: 5000 INJECTION INTRAVENOUS; SUBCUTANEOUS at 06:20

## 2024-03-30 RX ADMIN — CLONIDINE HYDROCHLORIDE 0.2 MG: 0.1 TABLET ORAL at 14:15

## 2024-03-30 RX ADMIN — ALBUMIN (HUMAN) 25 G: 0.25 INJECTION, SOLUTION INTRAVENOUS at 09:31

## 2024-03-30 RX ADMIN — ASPIRIN 81 MG: 81 TABLET, COATED ORAL at 09:32

## 2024-03-30 RX ADMIN — BUMETANIDE 1 MG: 0.25 INJECTION INTRAMUSCULAR; INTRAVENOUS at 09:30

## 2024-03-30 RX ADMIN — ISOSORBIDE DINITRATE 20 MG: 20 TABLET ORAL at 14:15

## 2024-03-30 RX ADMIN — AMLODIPINE BESYLATE 10 MG: 5 TABLET ORAL at 09:31

## 2024-03-30 RX ADMIN — SODIUM CHLORIDE, PRESERVATIVE FREE 10 ML: 5 INJECTION INTRAVENOUS at 09:45

## 2024-03-30 RX ADMIN — HYDRALAZINE HYDROCHLORIDE 100 MG: 50 TABLET ORAL at 09:31

## 2024-03-30 RX ADMIN — INSULIN LISPRO 2 UNITS: 100 INJECTION, SOLUTION INTRAVENOUS; SUBCUTANEOUS at 09:30

## 2024-03-30 RX ADMIN — SODIUM BICARBONATE 1300 MG: 650 TABLET ORAL at 14:15

## 2024-03-30 RX ADMIN — HYDRALAZINE HYDROCHLORIDE 100 MG: 50 TABLET ORAL at 14:15

## 2024-03-30 RX ADMIN — CARVEDILOL 37.5 MG: 12.5 TABLET, FILM COATED ORAL at 09:31

## 2024-03-30 RX ADMIN — SODIUM BICARBONATE 1300 MG: 650 TABLET ORAL at 09:32

## 2024-03-30 RX ADMIN — ATORVASTATIN CALCIUM 40 MG: 40 TABLET, FILM COATED ORAL at 09:31

## 2024-03-30 RX ADMIN — ALOGLIPTIN 6.25 MG: 6.25 TABLET, FILM COATED ORAL at 09:32

## 2024-03-30 RX ADMIN — ISOSORBIDE DINITRATE 20 MG: 20 TABLET ORAL at 09:31

## 2024-03-30 RX ADMIN — Medication 2000 UNITS: at 09:31

## 2024-03-30 RX ADMIN — CALCITRIOL CAPSULES 0.25 MCG 1 MCG: 0.25 CAPSULE ORAL at 09:31

## 2024-03-30 NOTE — DISCHARGE SUMMARY
Discharge Summary    Ayaz Villegas  :  1965  MRN:  [id number]    ADMIT DATE:  3/25/2024  DISCHARGE DATE:  3/30/2024    PRIMARY CARE PHYSICIAN:  Ayaz Delgadillo MD    VISIT STATUS: Admission    CODE STATUS:  Full Code    DISCHARGE DIAGNOSES:  Principal Problem:    Elevated troponin  Resolved Problems:    * No resolved hospital problems. *  End-stage renal disease      HOSPITAL COURSE:  58-year-old CKD stage V hypertension history of CAD came with a complaint of chest pain troponin essentially within normal limits chest x-ray showed no acute process as stress test on    Stress Combined Conclusion: The study is negative for myocardial ischemia.    Stress Function: Left ventricular function post-stress is normal. Post-stress ejection fraction is 51%.    Perfusion Comments: LV perfusion is probably normal.    Perfusion Defect: There is a left ventricular stress perfusion defect present in the inferior segment(s) that is predominantly fixed. Perfusion defect was visually and quantitatively present. This defect was visualized during the stress phase of imaging. The defect appears to be an artifact caused by soft tissue.    Perfusion Conclusion: There is no evidence of transient ischemic dilation (TID).     SIGNIFICANT DIAGNOSTIC STUDIES:    CONSULTANTS:    RECOMMENDED NEXT STEPS:   pmd     DISCHARGE MEDICATIONS:         Medication List        START taking these medications      aspirin 81 MG EC tablet  Take 1 tablet by mouth daily  Start taking on: 2024     cloNIDine 0.2 MG tablet  Commonly known as: CATAPRES  Take 1 tablet by mouth 3 times daily     glipiZIDE 5 MG tablet  Commonly known as: GLUCOTROL  Take 1 tablet by mouth 2 times daily     glucose 4 g chewable tablet  Take 4 tablets by mouth as needed for Low blood sugar     polyethylene glycol 17 g packet  Commonly known as: GLYCOLAX  Take 1 packet by mouth daily as needed for Constipation     vitamin D 50 MCG ( [place]) Tabs tablet  Commonly

## 2024-03-30 NOTE — PLAN OF CARE
Problem: Discharge Planning  Goal: Discharge to home or other facility with appropriate resources  3/29/2024 1107 by Nela Corrigan RN  Outcome: Progressing     Problem: Pain  Goal: Verbalizes/displays adequate comfort level or baseline comfort level  3/29/2024 2304 by Sonia Sarah RN  Outcome: Progressing  3/29/2024 1107 by Nela Corrigan RN  Outcome: Progressing     Problem: Safety - Adult  Goal: Free from fall injury  3/29/2024 2304 by Sonia Sarah RN  Outcome: Progressing  3/29/2024 1107 by Nela Corrigan RN  Outcome: Progressing     Problem: Chronic Conditions and Co-morbidities  Goal: Patient's chronic conditions and co-morbidity symptoms are monitored and maintained or improved  3/29/2024 1107 by Nela Corrigan RN  Outcome: Progressing

## 2024-03-30 NOTE — PROGRESS NOTES
Discharge plan of care/case management plan validated with provider discharge order. The After Visit Summary has been printed and given to the patient. The discharge instructions, medications, and pharmacy has been reviewed with the patient.The patient verbalized understanding.   IV and Tele Box removed. VSS.    Patient being discharged Home Self. Patient is being transported via family.

## 2024-03-30 NOTE — CARE COORDINATION
Transition of Care Plan:    RUR: 18%  Prior Level of Functioning: independent  Disposition: home   If SNF or IPR: Date FOC offered: n/a  Date FOC received: n/a  Accepting facility: n/a  Date authorization started with reference number: n/a  Date authorization received and expires: n/a  Follow up appointments: to be scheduled by    DME needed: in place  Transportation at discharge: cab  IM/IMM Medicare/ letter given: n/a  Is patient a  and connected with [facility]?    If yes, was [place] transfer form completed and [facility] notified?   Caregiver Contact: patient own caregiver   Discharge Caregiver contacted prior to discharge? yes  Care Conference needed? no  Barriers to discharge:  none

## 2024-03-30 NOTE — DISCHARGE INSTRUCTIONS
These medications were sent to [place], [place] - [address] [facility] [phone] - F [phone]  [address] [facility] [address]  Phone: [phone]     amLODIPine 10 MG tablet    aspirin 81 MG EC tablet    atorvastatin 40 MG tablet    calcitRIOL 0.25 MCG capsule    carvedilol 25 MG tablet    cloNIDine 0.1 MG tablet    glipiZIDE 5 MG tablet    hydrALAZINE 25 MG tablet    isosorbide dinitrate 5 MG tablet

## 2024-03-30 NOTE — PLAN OF CARE
Problem: Discharge Planning  Goal: Discharge to home or other facility with appropriate resources  Outcome: Progressing  Flowsheets (Taken 3/30/2024 0945)  Discharge to home or other facility with appropriate resources:   Identify barriers to discharge with patient and caregiver   Arrange for needed discharge resources and transportation as appropriate   Identify discharge learning needs (meds, wound care, etc)   Refer to discharge planning if patient needs post-hospital services based on physician order or complex needs related to functional status, cognitive ability or social support system     Problem: Pain  Goal: Verbalizes/displays adequate comfort level or baseline comfort level  3/30/2024 1129 by Karina Gage RN  Outcome: Progressing  3/29/2024 2304 by Sonia Sarah RN  Outcome: Progressing     Problem: Safety - Adult  Goal: Free from fall injury  3/30/2024 1129 by Karina Gage RN  Outcome: Progressing  3/29/2024 2304 by Sonia Sarah RN  Outcome: Progressing     Problem: Chronic Conditions and Co-morbidities  Goal: Patient's chronic conditions and co-morbidity symptoms are monitored and maintained or improved  Outcome: Progressing  Flowsheets (Taken 3/30/2024 0945)  Care Plan - Patient's Chronic Conditions and Co-Morbidity Symptoms are Monitored and Maintained or Improved:   Monitor and assess patient's chronic conditions and comorbid symptoms for stability, deterioration, or improvement   Collaborate with multidisciplinary team to address chronic and comorbid conditions and prevent exacerbation or deterioration   Update acute care plan with appropriate goals if chronic or comorbid symptoms are exacerbated and prevent overall improvement and discharge

## 2024-03-30 NOTE — PROGRESS NOTES
CARDIOLOGY PROGRESS NOTE      Patient Name: Ayaz Villegas  Age: 58 y.o.  Gender:male  :1965  MRN: [id number]    Patient seen and examined. This is a patient with a history of CKD stage 5, not on dialysis, DM, HTN, HFpEF who presented with chest pain and shortness of breath now being followed for elevated troponin and history of CAD.     Sob is better just he doesn't feel great today. Cr up slightly. No nausea ot pain. BP is up a bit. He is just feeling \"off.\"Stable to dc from cardiology standpoint.     Telemetry reviewed, there were no events noted in the past 24 hours.    Pertinent review of systems items noted above, all other systems are negative. Current medications reviewed.    Physical Examination    Allergies   Allergen Reactions    Lisinopril Other (See Comments)     Patient stated \"It messed my kidneys up\"    Iodine Nausea And Vomiting    Iodinated Contrast Media Other (See Comments)     Vitals:    24 0817   BP: (!) 150/70   Pulse: 64   Resp: 18   Temp: 97.9 °F (36.6 °C)   SpO2: 94%     Vital signs are stable  No apparent distress.  Heart has a regular rate and rhythm.  no murmur  Lungs are clear  Abdomen is soft, nontender, normal bowel sounds.  Extremities have mild edema  Skin is dry and warm.  Normal affect    Labs reviewed:  Recent Results (from the past 12 hour(s))   Renal Function Panel    Collection Time: 24  3:39 AM   Result Value Ref Range    Sodium 140 136 - 145 mmol/L    Potassium 4.3 3.5 - 5.1 mmol/L    Chloride 111 (H) 97 - 108 mmol/L    CO2 22 21 - 32 mmol/L    Anion Gap 7 5 - 15 mmol/L    Glucose 207 (H) 65 - 100 mg/dL    BUN 65 (H) 6 - 20 mg/dL    Creatinine 6.24 (H) 0.70 - 1.30 mg/dL    Bun/Cre Ratio 10 (L) 12 - 20      Est, Glom Filt Rate 10 (L) >60 ml/min/1.73m2    Calcium 8.2 (L) 8.5 - 10.1 mg/dL    Phosphorus 4.4 2.6 - 4.7 mg/dL    Albumin 3.2 (L) 3.5 - 5.0 g/dL   POCT Glucose    Collection Time: 03/30/24  8:14 AM   Result Value Ref Range    POC Glucose 223 (H)

## 2024-03-30 NOTE — PROGRESS NOTES
Renal Progress Note    Patient: Ayaz Villegas MRN: [id number]  SSN: [id number]    YOB: 1965  Age: 58 y.o.  Sex: male      Admit Date: 3/25/2024    LOS: 5 days     Subjective:     -Patient seen at bedside today, he is comfortably resting.  Reported intermittent episodes of chest tightness.  No other symptoms like shortness of breath or palpitations reported.  He informs me that he had a cardiac stress test 2 days ago and it was negative.    -Patient concerned about not being able to get medications on time because of insurance and financial issues .  He said the hospital  has been assisting him with this.    Yesterday  had lengthy discussion with patient regarding starting dialysis with his current GFR and CKD 5 for the past 1 year.  He asked multiple questions which were answered. He wants to wait for another few weeks!      Current Facility-Administered Medications   Medication Dose Route Frequency    cloNIDine (CATAPRES) tablet 0.2 mg  0.2 mg Oral TID    bumetanide (BUMEX) injection 1 mg  1 mg IntraVENous Daily    calcitRIOL (ROCALTROL) capsule 1 mcg  1 mcg Oral Daily    Vitamin D (CHOLECALCIFEROL) tablet 2,000 Units  2,000 Units Oral Daily    epoetin laney-epbx (RETACRIT) injection 10,000 Units  10,000 Units SubCUTAneous Once per day on Tue Thu Sat    0.9 % sodium chloride infusion   IntraVENous PRN    amLODIPine (NORVASC) tablet 10 mg  10 mg Oral Daily    atorvastatin (LIPITOR) tablet 40 mg  40 mg Oral Daily    carvedilol (COREG) tablet 37.5 mg  37.5 mg Oral BID WC    hydrALAZINE (APRESOLINE) tablet 100 mg  100 mg Oral TID    isosorbide dinitrate (ISORDIL) tablet 20 mg  20 mg Oral TID    alogliptin (NESINA) tablet 6.25 mg  6.25 mg Oral Daily    sodium bicarbonate tablet 1,300 mg  1,300 mg Oral TID WC    sodium chloride flush 0.9 % injection 5-40 mL  5-40 mL IntraVENous 2 times per day    sodium chloride flush 0.9 % injection 5-40 mL  5-40 mL IntraVENous PRN    0.9 % sodium

## 2024-03-30 NOTE — DISCHARGE INSTR - DIET

## 2024-04-20 ENCOUNTER — APPOINTMENT (OUTPATIENT)
Facility: HOSPITAL | Age: 59
End: 2024-04-20
Attending: EMERGENCY MEDICINE
Payer: MEDICAID

## 2024-04-20 ENCOUNTER — HOSPITAL ENCOUNTER (EMERGENCY)
Facility: HOSPITAL | Age: 59
Discharge: HOME | End: 2024-04-20
Attending: EMERGENCY MEDICINE
Payer: MEDICAID

## 2024-04-20 VITALS
BODY MASS INDEX: 20.54 KG/M2 | SYSTOLIC BLOOD PRESSURE: 170 MMHG | HEART RATE: 64 BPM | HEIGHT: 73 IN | OXYGEN SATURATION: 99 % | TEMPERATURE: 97.8 F | RESPIRATION RATE: 18 BRPM | DIASTOLIC BLOOD PRESSURE: 82 MMHG | WEIGHT: 155 LBS

## 2024-04-20 DIAGNOSIS — R53.1 GENERAL WEAKNESS: ICD-10-CM

## 2024-04-20 DIAGNOSIS — N19: Primary | ICD-10-CM

## 2024-04-20 LAB
ALBUMIN SERPL-MCNC: 3.5 G/DL (ref 3.5–5)
ALBUMIN/GLOB SERPL: 0.9 (ref 1.1–2.2)
ALP SERPL-CCNC: 101 U/L (ref 45–117)
ALT SERPL-CCNC: 27 U/L (ref 12–78)
ANION GAP SERPL CALC-SCNC: 13 MMOL/L (ref 5–15)
AST SERPL W P-5'-P-CCNC: 16 U/L (ref 15–37)
BASOPHILS # BLD: 0 K/UL (ref 0–0.1)
BASOPHILS NFR BLD: 0 % (ref 0–1)
BILIRUB SERPL-MCNC: 0.3 MG/DL (ref 0.2–1)
BUN SERPL-MCNC: 41 MG/DL (ref 6–20)
BUN/CREAT SERPL: 8 (ref 12–20)
CA-I BLD-MCNC: 8.8 MG/DL (ref 8.5–10.1)
CHLORIDE SERPL-SCNC: 105 MMOL/L (ref 97–108)
CO2 SERPL-SCNC: 22 MMOL/L (ref 21–32)
CREAT SERPL-MCNC: 5.12 MG/DL (ref 0.7–1.3)
DIFFERENTIAL METHOD BLD: ABNORMAL
EKG ATRIAL RATE: 79 BPM
EKG DIAGNOSIS: NORMAL
EKG P AXIS: 12 DEGREES
EKG P-R INTERVAL: 283 MS
EKG Q-T INTERVAL: 376 MS
EKG QRS DURATION: 84 MS
EKG QTC CALCULATION (BAZETT): 432 MS
EKG R AXIS: 67 DEGREES
EKG T AXIS: 65 DEGREES
EKG VENTRICULAR RATE: 79 BPM
EOSINOPHIL # BLD: 0.2 K/UL (ref 0–0.4)
EOSINOPHIL NFR BLD: 3 % (ref 0–7)
ERYTHROCYTE [DISTWIDTH] IN BLOOD BY AUTOMATED COUNT: 14.9 % (ref 11.5–14.5)
GLOBULIN SER CALC-MCNC: 4.1 G/DL (ref 2–4)
GLUCOSE SERPL-MCNC: 231 MG/DL (ref 65–100)
HCT VFR BLD AUTO: 28.8 % (ref 36.6–50.3)
HGB BLD-MCNC: 9.2 G/DL (ref 12.1–17)
IMM GRANULOCYTES # BLD AUTO: 0 K/UL (ref 0–0.04)
IMM GRANULOCYTES NFR BLD AUTO: 0 % (ref 0–0.5)
LYMPHOCYTES # BLD: 1 K/UL (ref 0.8–3.5)
LYMPHOCYTES NFR BLD: 15 % (ref 12–49)
MAGNESIUM SERPL-MCNC: 1.7 MG/DL (ref 1.6–2.4)
MCH RBC QN AUTO: 28.5 PG (ref 26–34)
MCHC RBC AUTO-ENTMCNC: 31.9 G/DL (ref 30–36.5)
MCV RBC AUTO: 89.2 FL (ref 80–99)
MONOCYTES # BLD: 0.6 K/UL (ref 0–1)
MONOCYTES NFR BLD: 9 % (ref 5–13)
NEUTS SEG # BLD: 5.1 K/UL (ref 1.8–8)
NEUTS SEG NFR BLD: 73 % (ref 32–75)
NRBC # BLD: 0 K/UL (ref 0–0.01)
NRBC BLD-RTO: 0 PER 100 WBC
PLATELET # BLD AUTO: 154 K/UL (ref 150–400)
PMV BLD AUTO: 10 FL (ref 8.9–12.9)
POTASSIUM SERPL-SCNC: 4.6 MMOL/L (ref 3.5–5.1)
PROT SERPL-MCNC: 7.6 G/DL (ref 6.4–8.2)
RBC # BLD AUTO: 3.23 M/UL (ref 4.1–5.7)
RBC MORPH BLD: ABNORMAL
RBC MORPH BLD: ABNORMAL
SODIUM SERPL-SCNC: 140 MMOL/L (ref 136–145)
WBC # BLD AUTO: 6.9 K/UL (ref 4.1–11.1)

## 2024-04-20 PROCEDURE — 80053 COMPREHEN METABOLIC PANEL: CPT

## 2024-04-20 PROCEDURE — 36415 COLL VENOUS BLD VENIPUNCTURE: CPT

## 2024-04-20 PROCEDURE — 93005 ELECTROCARDIOGRAM TRACING: CPT | Performed by: EMERGENCY MEDICINE

## 2024-04-20 PROCEDURE — 99285 EMERGENCY DEPT VISIT HI MDM: CPT

## 2024-04-20 PROCEDURE — 6370000000 HC RX 637 (ALT 250 FOR IP): Performed by: EMERGENCY MEDICINE

## 2024-04-20 PROCEDURE — 83735 ASSAY OF MAGNESIUM: CPT

## 2024-04-20 PROCEDURE — 6360000002 HC RX W HCPCS: Performed by: EMERGENCY MEDICINE

## 2024-04-20 PROCEDURE — 71045 X-RAY EXAM CHEST 1 VIEW: CPT

## 2024-04-20 PROCEDURE — 85025 COMPLETE CBC W/AUTO DIFF WBC: CPT

## 2024-04-20 PROCEDURE — 96374 THER/PROPH/DIAG INJ IV PUSH: CPT

## 2024-04-20 RX ORDER — CLONIDINE HYDROCHLORIDE 0.1 MG/1
0.3 TABLET ORAL
Status: COMPLETED | OUTPATIENT
Start: 2024-04-20 | End: 2024-04-20

## 2024-04-20 RX ORDER — AMLODIPINE BESYLATE 10 MG/1
10 TABLET ORAL
Status: COMPLETED | OUTPATIENT
Start: 2024-04-20 | End: 2024-04-20

## 2024-04-20 RX ORDER — HYDRALAZINE HYDROCHLORIDE 20 MG/ML
20 INJECTION INTRAMUSCULAR; INTRAVENOUS
Status: COMPLETED | OUTPATIENT
Start: 2024-04-20 | End: 2024-04-20

## 2024-04-20 RX ADMIN — HYDRALAZINE HYDROCHLORIDE 20 MG: 20 INJECTION, SOLUTION INTRAMUSCULAR; INTRAVENOUS at 11:41

## 2024-04-20 RX ADMIN — AMLODIPINE BESYLATE 10 MG: 10 TABLET ORAL at 10:02

## 2024-04-20 RX ADMIN — CLONIDINE HYDROCHLORIDE 0.3 MG: 0.1 TABLET ORAL at 10:52

## 2024-04-20 NOTE — ED PROVIDER NOTES
[facility] EMERGENCY DEPT  EMERGENCY DEPARTMENT HISTORY AND PHYSICAL EXAM      Date: 4/20/2024  Patient Name: Ayaz Villegas  MRN: [id number]  YOB: 1965  Date of evaluation: 4/20/2024  Provider: Jaymei Diaz MD   Note Started: 9:27 AM EDT 4/20/24    HISTORY OF PRESENT ILLNESS     Chief Complaint   Patient presents with    Fatigue       History Provided By: Patient    HPI: Ayaz Villegas is a 58 y.o. male     PAST MEDICAL HISTORY   Past Medical History:  Past Medical History:   Diagnosis Date    Arrhythmia     heart murmur    CAD (coronary artery disease)     hx of stent    Chronic metabolic acidosis     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     CKD (chronic kidney disease) stage 5, GFR less than 15 ml/min (HCC)     Diabetes (HCC)     Hypertension     Iron deficiency anemia     Lumbar spondylosis     Sinus problem        Past Surgical History:  Past Surgical History:   Procedure Laterality Date    CORONARY ANGIOPLASTY WITH STENT PLACEMENT      ORTHOPEDIC SURGERY      Neck surgery       Family History:  Family History   Problem Relation Age of Onset    No Known Problems Brother     No Known Problems Brother     No Known Problems Brother     No Known Problems Sister     No Known Problems Sister     No Known Problems Sister     Diabetes Sister     No Known Problems Sister     Dementia Father     Diabetes Mother        Social History:  Social History     Tobacco Use    Smoking status: Never    Smokeless tobacco: Never   Substance Use Topics    Alcohol use: Not Currently    Drug use: Not Currently     Types: Marijuana (Weed)       Allergies:  Allergies   Allergen Reactions    Lisinopril Other (See Comments)     Patient stated \"It messed my kidneys up\"    Iodine Nausea And Vomiting    Iodinated Contrast Media Other (See Comments)       PCP: Ayaz Delgadillo MD    Current Meds:   No current facility-administered medications for this encounter.     Current Outpatient Medications   Medication Sig Dispense

## 2024-04-20 NOTE — ED TRIAGE NOTES
Pt reports weakness, fatigue and headache for several days. Pt reports recent admission to [facility] and states he was given a blood transfusion. Pt stage 5 kidney failure and is not on dialysis.

## 2024-04-22 LAB
EKG ATRIAL RATE: 79 BPM
EKG DIAGNOSIS: NORMAL
EKG P AXIS: 12 DEGREES
EKG P-R INTERVAL: 283 MS
EKG Q-T INTERVAL: 376 MS
EKG QRS DURATION: 84 MS
EKG QTC CALCULATION (BAZETT): 432 MS
EKG R AXIS: 67 DEGREES
EKG T AXIS: 65 DEGREES
EKG VENTRICULAR RATE: 79 BPM

## 2024-04-24 PROBLEM — R79.89 ELEVATED TROPONIN: Status: RESOLVED | Noted: 2024-03-25 | Resolved: 2024-04-24

## 2024-09-05 ENCOUNTER — APPOINTMENT (OUTPATIENT)
Facility: HOSPITAL | Age: 59
End: 2024-09-05
Payer: COMMERCIAL

## 2024-09-05 ENCOUNTER — HOSPITAL ENCOUNTER (OUTPATIENT)
Facility: HOSPITAL | Age: 59
Setting detail: OBSERVATION
Discharge: HOME | End: 2024-09-07
Attending: EMERGENCY MEDICINE
Payer: COMMERCIAL

## 2024-09-05 DIAGNOSIS — D63.8 ANEMIA, CHRONIC DISEASE: ICD-10-CM

## 2024-09-05 DIAGNOSIS — N17.9 ACUTE RENAL FAILURE SUPERIMPOSED ON STAGE 4 CHRONIC KIDNEY DISEASE, UNSPECIFIED ACUTE RENAL FAILURE TYPE (HCC): Primary | ICD-10-CM

## 2024-09-05 DIAGNOSIS — N18.4 ACUTE RENAL FAILURE SUPERIMPOSED ON STAGE 4 CHRONIC KIDNEY DISEASE, UNSPECIFIED ACUTE RENAL FAILURE TYPE (HCC): Primary | ICD-10-CM

## 2024-09-05 PROBLEM — N18.9 CKD (CHRONIC KIDNEY DISEASE): Status: ACTIVE | Noted: 2024-09-05

## 2024-09-05 LAB
ANION GAP SERPL CALC-SCNC: 9 MMOL/L (ref 2–12)
APPEARANCE UR: CLEAR
BACTERIA URNS QL MICRO: NEGATIVE /HPF
BASOPHILS # BLD: 0 K/UL (ref 0–0.1)
BASOPHILS NFR BLD: 1 % (ref 0–1)
BILIRUB UR QL: NEGATIVE
BUN SERPL-MCNC: 54 MG/DL (ref 6–20)
BUN/CREAT SERPL: 8 (ref 12–20)
CA-I BLD-MCNC: 9.1 MG/DL (ref 8.5–10.1)
CHLORIDE SERPL-SCNC: 111 MMOL/L (ref 97–108)
CO2 SERPL-SCNC: 21 MMOL/L (ref 21–32)
COLOR UR: ABNORMAL
CREAT SERPL-MCNC: 7.05 MG/DL (ref 0.7–1.3)
CREAT UR-MCNC: 91 MG/DL
DIFFERENTIAL METHOD BLD: ABNORMAL
EOSINOPHIL # BLD: 0.3 K/UL (ref 0–0.4)
EOSINOPHIL NFR BLD: 6 % (ref 0–7)
EPITH CASTS URNS QL MICRO: ABNORMAL /LPF
ERYTHROCYTE [DISTWIDTH] IN BLOOD BY AUTOMATED COUNT: 14.5 % (ref 11.5–14.5)
GLUCOSE SERPL-MCNC: 157 MG/DL (ref 65–100)
GLUCOSE UR STRIP.AUTO-MCNC: 150 MG/DL
HCT VFR BLD AUTO: 22.8 % (ref 36.6–50.3)
HGB BLD-MCNC: 7.2 G/DL (ref 12.1–17)
HGB UR QL STRIP: NEGATIVE
IMM GRANULOCYTES # BLD AUTO: 0 K/UL (ref 0–0.04)
IMM GRANULOCYTES NFR BLD AUTO: 0 % (ref 0–0.5)
KETONES UR QL STRIP.AUTO: NEGATIVE MG/DL
LEUKOCYTE ESTERASE UR QL STRIP.AUTO: NEGATIVE
LYMPHOCYTES # BLD: 1.1 K/UL (ref 0.8–3.5)
LYMPHOCYTES NFR BLD: 22 % (ref 12–49)
MCH RBC QN AUTO: 30.3 PG (ref 26–34)
MCHC RBC AUTO-ENTMCNC: 31.6 G/DL (ref 30–36.5)
MCV RBC AUTO: 95.8 FL (ref 80–99)
MONOCYTES # BLD: 0.5 K/UL (ref 0–1)
MONOCYTES NFR BLD: 10 % (ref 5–13)
NEUTS SEG # BLD: 3.2 K/UL (ref 1.8–8)
NEUTS SEG NFR BLD: 61 % (ref 32–75)
NITRITE UR QL STRIP.AUTO: NEGATIVE
NRBC # BLD: 0 K/UL (ref 0–0.01)
NRBC BLD-RTO: 0 PER 100 WBC
PH UR STRIP: 7 (ref 5–8)
PLATELET # BLD AUTO: 144 K/UL (ref 150–400)
PMV BLD AUTO: 10.3 FL (ref 8.9–12.9)
POTASSIUM SERPL-SCNC: 4.9 MMOL/L (ref 3.5–5.1)
PROT UR STRIP-MCNC: 100 MG/DL
PROT UR-MCNC: 389 MG/DL (ref 0–11.9)
PROT/CREAT UR-RTO: 4.3
RBC # BLD AUTO: 2.38 M/UL (ref 4.1–5.7)
RBC #/AREA URNS HPF: ABNORMAL /HPF (ref 0–5)
SODIUM SERPL-SCNC: 141 MMOL/L (ref 136–145)
SP GR UR REFRACTOMETRY: 1.01 (ref 1–1.03)
URINE CULTURE IF INDICATED: ABNORMAL
UROBILINOGEN UR QL STRIP.AUTO: 0.1 EU/DL (ref 0.1–1)
WBC # BLD AUTO: 5.3 K/UL (ref 4.1–11.1)
WBC URNS QL MICRO: ABNORMAL /HPF (ref 0–4)

## 2024-09-05 PROCEDURE — 96372 THER/PROPH/DIAG INJ SC/IM: CPT

## 2024-09-05 PROCEDURE — 86923 COMPATIBILITY TEST ELECTRIC: CPT

## 2024-09-05 PROCEDURE — 6370000000 HC RX 637 (ALT 250 FOR IP): Performed by: NURSE PRACTITIONER

## 2024-09-05 PROCEDURE — G0378 HOSPITAL OBSERVATION PER HR: HCPCS

## 2024-09-05 PROCEDURE — 86900 BLOOD TYPING SEROLOGIC ABO: CPT

## 2024-09-05 PROCEDURE — 96374 THER/PROPH/DIAG INJ IV PUSH: CPT

## 2024-09-05 PROCEDURE — 2580000003 HC RX 258: Performed by: NURSE PRACTITIONER

## 2024-09-05 PROCEDURE — 6360000002 HC RX W HCPCS

## 2024-09-05 PROCEDURE — 84156 ASSAY OF PROTEIN URINE: CPT

## 2024-09-05 PROCEDURE — 86901 BLOOD TYPING SEROLOGIC RH(D): CPT

## 2024-09-05 PROCEDURE — 99285 EMERGENCY DEPT VISIT HI MDM: CPT

## 2024-09-05 PROCEDURE — 80048 BASIC METABOLIC PNL TOTAL CA: CPT

## 2024-09-05 PROCEDURE — 85025 COMPLETE CBC W/AUTO DIFF WBC: CPT

## 2024-09-05 PROCEDURE — 6370000000 HC RX 637 (ALT 250 FOR IP): Performed by: EMERGENCY MEDICINE

## 2024-09-05 PROCEDURE — 76770 US EXAM ABDO BACK WALL COMP: CPT

## 2024-09-05 PROCEDURE — 81001 URINALYSIS AUTO W/SCOPE: CPT

## 2024-09-05 PROCEDURE — 6360000002 HC RX W HCPCS: Performed by: NURSE PRACTITIONER

## 2024-09-05 PROCEDURE — 86850 RBC ANTIBODY SCREEN: CPT

## 2024-09-05 PROCEDURE — 96375 TX/PRO/DX INJ NEW DRUG ADDON: CPT

## 2024-09-05 PROCEDURE — 36415 COLL VENOUS BLD VENIPUNCTURE: CPT

## 2024-09-05 PROCEDURE — 82570 ASSAY OF URINE CREATININE: CPT

## 2024-09-05 RX ORDER — VITAMIN B COMPLEX
2000 TABLET ORAL DAILY
Status: DISCONTINUED | OUTPATIENT
Start: 2024-09-05 | End: 2024-09-07 | Stop reason: HOSPADM

## 2024-09-05 RX ORDER — AMLODIPINE BESYLATE 5 MG/1
10 TABLET ORAL DAILY
Status: DISCONTINUED | OUTPATIENT
Start: 2024-09-05 | End: 2024-09-07 | Stop reason: HOSPADM

## 2024-09-05 RX ORDER — ONDANSETRON 2 MG/ML
4 INJECTION INTRAMUSCULAR; INTRAVENOUS EVERY 6 HOURS PRN
Status: DISCONTINUED | OUTPATIENT
Start: 2024-09-05 | End: 2024-09-07 | Stop reason: HOSPADM

## 2024-09-05 RX ORDER — ACETAMINOPHEN 325 MG/1
650 TABLET ORAL EVERY 6 HOURS PRN
Status: DISCONTINUED | OUTPATIENT
Start: 2024-09-05 | End: 2024-09-07 | Stop reason: HOSPADM

## 2024-09-05 RX ORDER — ISOSORBIDE DINITRATE 20 MG/1
20 TABLET ORAL 3 TIMES DAILY
Status: DISCONTINUED | OUTPATIENT
Start: 2024-09-05 | End: 2024-09-07 | Stop reason: HOSPADM

## 2024-09-05 RX ORDER — POLYETHYLENE GLYCOL 3350 17 G/17G
17 POWDER, FOR SOLUTION ORAL DAILY PRN
Status: DISCONTINUED | OUTPATIENT
Start: 2024-09-05 | End: 2024-09-07 | Stop reason: HOSPADM

## 2024-09-05 RX ORDER — ASPIRIN 81 MG/1
81 TABLET ORAL DAILY
Status: DISCONTINUED | OUTPATIENT
Start: 2024-09-06 | End: 2024-09-07 | Stop reason: HOSPADM

## 2024-09-05 RX ORDER — SODIUM CHLORIDE 0.9 % (FLUSH) 0.9 %
5-40 SYRINGE (ML) INJECTION EVERY 12 HOURS SCHEDULED
Status: DISCONTINUED | OUTPATIENT
Start: 2024-09-05 | End: 2024-09-07 | Stop reason: HOSPADM

## 2024-09-05 RX ORDER — HYDRALAZINE HYDROCHLORIDE 50 MG/1
100 TABLET, FILM COATED ORAL EVERY 8 HOURS SCHEDULED
Status: DISCONTINUED | OUTPATIENT
Start: 2024-09-05 | End: 2024-09-07 | Stop reason: HOSPADM

## 2024-09-05 RX ORDER — GLIPIZIDE 5 MG/1
2.5 TABLET ORAL DAILY
Status: DISCONTINUED | OUTPATIENT
Start: 2024-09-06 | End: 2024-09-07 | Stop reason: HOSPADM

## 2024-09-05 RX ORDER — CALCITRIOL 0.25 UG/1
0.5 CAPSULE, LIQUID FILLED ORAL DAILY
Status: DISCONTINUED | OUTPATIENT
Start: 2024-09-06 | End: 2024-09-07 | Stop reason: HOSPADM

## 2024-09-05 RX ORDER — SODIUM CHLORIDE 9 MG/ML
INJECTION, SOLUTION INTRAVENOUS PRN
Status: DISCONTINUED | OUTPATIENT
Start: 2024-09-05 | End: 2024-09-07 | Stop reason: HOSPADM

## 2024-09-05 RX ORDER — HEPARIN SODIUM 5000 [USP'U]/ML
5000 INJECTION, SOLUTION INTRAVENOUS; SUBCUTANEOUS EVERY 8 HOURS SCHEDULED
Status: DISCONTINUED | OUTPATIENT
Start: 2024-09-05 | End: 2024-09-07 | Stop reason: HOSPADM

## 2024-09-05 RX ORDER — CARVEDILOL 12.5 MG/1
37.5 TABLET ORAL 2 TIMES DAILY WITH MEALS
Status: DISCONTINUED | OUTPATIENT
Start: 2024-09-05 | End: 2024-09-07 | Stop reason: HOSPADM

## 2024-09-05 RX ORDER — HYDRALAZINE HYDROCHLORIDE 20 MG/ML
10 INJECTION INTRAMUSCULAR; INTRAVENOUS EVERY 4 HOURS PRN
Status: COMPLETED | OUTPATIENT
Start: 2024-09-05 | End: 2024-09-07

## 2024-09-05 RX ORDER — ATORVASTATIN CALCIUM 40 MG/1
40 TABLET, FILM COATED ORAL DAILY
Status: DISCONTINUED | OUTPATIENT
Start: 2024-09-05 | End: 2024-09-07 | Stop reason: HOSPADM

## 2024-09-05 RX ORDER — SODIUM BICARBONATE 650 MG/1
1300 TABLET ORAL
Status: DISCONTINUED | OUTPATIENT
Start: 2024-09-05 | End: 2024-09-07 | Stop reason: HOSPADM

## 2024-09-05 RX ORDER — CLONIDINE HYDROCHLORIDE 0.1 MG/1
0.2 TABLET ORAL
Status: COMPLETED | OUTPATIENT
Start: 2024-09-05 | End: 2024-09-05

## 2024-09-05 RX ORDER — SODIUM CHLORIDE 0.9 % (FLUSH) 0.9 %
5-40 SYRINGE (ML) INJECTION PRN
Status: DISCONTINUED | OUTPATIENT
Start: 2024-09-05 | End: 2024-09-07 | Stop reason: HOSPADM

## 2024-09-05 RX ORDER — ONDANSETRON 4 MG/1
4 TABLET, ORALLY DISINTEGRATING ORAL EVERY 8 HOURS PRN
Status: DISCONTINUED | OUTPATIENT
Start: 2024-09-05 | End: 2024-09-07 | Stop reason: HOSPADM

## 2024-09-05 RX ORDER — CLONIDINE HYDROCHLORIDE 0.2 MG/1
0.2 TABLET ORAL 3 TIMES DAILY
Status: DISCONTINUED | OUTPATIENT
Start: 2024-09-05 | End: 2024-09-07 | Stop reason: HOSPADM

## 2024-09-05 RX ORDER — ACETAMINOPHEN 650 MG/1
650 SUPPOSITORY RECTAL EVERY 6 HOURS PRN
Status: DISCONTINUED | OUTPATIENT
Start: 2024-09-05 | End: 2024-09-07 | Stop reason: HOSPADM

## 2024-09-05 RX ADMIN — SODIUM BICARBONATE 1300 MG: 650 TABLET ORAL at 19:00

## 2024-09-05 RX ADMIN — CLONIDINE HYDROCHLORIDE 0.2 MG: 0.1 TABLET ORAL at 17:44

## 2024-09-05 RX ADMIN — HYDRALAZINE HYDROCHLORIDE 10 MG: 20 INJECTION INTRAMUSCULAR; INTRAVENOUS at 19:23

## 2024-09-05 RX ADMIN — Medication 2000 UNITS: at 20:57

## 2024-09-05 RX ADMIN — AMLODIPINE BESYLATE 10 MG: 5 TABLET ORAL at 18:33

## 2024-09-05 RX ADMIN — ISOSORBIDE DINITRATE 20 MG: 20 TABLET ORAL at 22:30

## 2024-09-05 RX ADMIN — HYDRALAZINE HYDROCHLORIDE 100 MG: 50 TABLET ORAL at 20:57

## 2024-09-05 RX ADMIN — HEPARIN SODIUM 5000 UNITS: 5000 INJECTION INTRAVENOUS; SUBCUTANEOUS at 22:29

## 2024-09-05 RX ADMIN — EPOETIN ALFA-EPBX 10000 UNITS: 10000 INJECTION, SOLUTION INTRAVENOUS; SUBCUTANEOUS at 18:34

## 2024-09-05 RX ADMIN — CLONIDINE HYDROCHLORIDE 0.2 MG: 0.1 TABLET ORAL at 20:56

## 2024-09-05 RX ADMIN — CARVEDILOL 37.5 MG: 12.5 TABLET, FILM COATED ORAL at 19:00

## 2024-09-05 RX ADMIN — SODIUM CHLORIDE, PRESERVATIVE FREE 10 ML: 5 INJECTION INTRAVENOUS at 22:30

## 2024-09-05 RX ADMIN — ATORVASTATIN CALCIUM 40 MG: 40 TABLET, FILM COATED ORAL at 19:00

## 2024-09-05 ASSESSMENT — PAIN SCALES - GENERAL
PAINLEVEL_OUTOF10: 0
PAINLEVEL_OUTOF10: 0

## 2024-09-05 ASSESSMENT — PAIN - FUNCTIONAL ASSESSMENT: PAIN_FUNCTIONAL_ASSESSMENT: 0-10

## 2024-09-05 NOTE — ED NOTES
ED TO INPATIENT SBAR HANDOFF    Patient Name: Ayaz Villegas   Preferred Name: Ayaz  : 1965  58 y.o.   Family/Caregiver Present: no   Code Status Order: Full Code  PO Status: NPO:No  Telemetry Order: No  C-SSRS: Risk of Suicide: No Risk  Sitter no     Restraints:     Sepsis Risk Score      Situation  Chief Complaint   Patient presents with    Abnormal Lab     Brief Description of Patient's Condition: Pt arrived with complaints of low hgb. States that he had labs drawn yesterday and his hgb came back at 6 so he was sent here. Hgb 7.2 here. Pt being admitted under Dr. Durbin for Acute renal failure superimposed on stage 4 chronic kidney disease and anemia.   Mental Status: oriented, alert, coherent, logical, thought processes intact, and able to concentrate and follow conversation  Arrived from:Home  Imaging:   US RETROPERITONEAL COMPLETE    (Results Pending)     Abnormal labs:   Abnormal Labs Reviewed   CBC WITH AUTO DIFFERENTIAL - Abnormal; Notable for the following components:       Result Value    RBC 2.38 (*)     Hemoglobin 7.2 (*)     Hematocrit 22.8 (*)     Platelets 144 (*)     All other components within normal limits   BASIC METABOLIC PANEL - Abnormal; Notable for the following components:    Chloride 111 (*)     Glucose 157 (*)     BUN 54 (*)     Creatinine 7.05 (*)     BUN/Creatinine Ratio 8 (*)     Est, Glom Filt Rate 8 (*)     All other components within normal limits   URINALYSIS WITH REFLEX TO CULTURE - Abnormal; Notable for the following components:    Protein,  (*)     Glucose, Ur 150 (*)     All other components within normal limits   PROTEIN / CREATININE RATIO, URINE - Abnormal; Notable for the following components:    Protein, Urine, Random 389 (*)     All other components within normal limits       Background  Allergies:   Allergies   Allergen Reactions    Lisinopril Other (See Comments)     Patient stated \"It messed my kidneys up\"    Iodine Nausea And Vomiting    Iodinated  range)   acetaminophen (TYLENOL) tablet 650 mg (has no administration in time range)     Or   acetaminophen (TYLENOL) suppository 650 mg (has no administration in time range)   amLODIPine (NORVASC) tablet 10 mg (has no administration in time range)   aspirin EC tablet 81 mg (has no administration in time range)   atorvastatin (LIPITOR) tablet 40 mg (has no administration in time range)   calcitRIOL (ROCALTROL) capsule 0.5 mcg (has no administration in time range)   carvedilol (COREG) tablet 37.5 mg (has no administration in time range)   cloNIDine (CATAPRES) tablet 0.2 mg (has no administration in time range)   glipiZIDE (GLUCOTROL) tablet 5 mg (has no administration in time range)   isosorbide dinitrate (ISORDIL) tablet 20 mg (has no administration in time range)   sodium bicarbonate tablet 1,300 mg (has no administration in time range)   vitamin D (CHOLECALCIFEROL) tablet 2,000 Units (has no administration in time range)   cloNIDine (CATAPRES) tablet 0.2 mg (0.2 mg Oral Given 9/5/24 1744)     Last documented pain medication administration: n/a  Pertinent or High Risk Medications/Drips: no   If Yes, please provide details:   Blood Product Administration: no  If Yes, please provide details:   Process Protocols/Bundles: N/A    Recommendation  Incomplete STAT orders: n/a  Overdue Medications: All home meds ordered. None verified at this time.   Patient Belongings:    Additional Comments: n/a     If any further questions, please call Sending RN at [phone]      Admitting Unit Notification  Name of person notified and time: 1824 Asif        Electronically signed by: Electronically signed by Brianna Hui RN on 9/5/2024 at 6:21 PM

## 2024-09-05 NOTE — ED NOTES
RN to bedside, initial pt contact.  Pt updated on plan of care, prn hypertension medication administered.  Pt has no other needs at this time.  Call bell within reach.

## 2024-09-05 NOTE — H&P
Hospitalist Admission Note    NAME: Ayaz Villegas   :  1965   MRN:  [id number]     Date/Time:  2024 6:25 PM    Patient PCP: Ayaz Delgadillo MD    ______________________________________________________________________  Given the patient's current clinical presentation, I have a high level of concern for decompensation if discharged from the emergency department.  Complex decision making was performed, which includes reviewing the patient's available past medical records, laboratory results, and x-ray films.       My assessment of this patient's clinical condition and my plan of care is as follows.    Assessment / Plan:    CKD stage V   - Nephrology following  -  Cr 5.1  -  Cr 7.05/BUN 54, GFR   - No emergent need for dialysis, patient undecided at this time per nephrology  -Renal ultrasound pending    Anemia   - Hgb 7.2, will transfuse if Hgb < 7.0  - Check iron studies   - Check occult stool  - Continue Epogen per nephrology recommendations    HTN  - Blood pressure above goal  - patient did not take his medications this morning  - Continue amlodipine, carvedilol, clonidine, hydralazine, and isosorbide    CAD  - Continue atorvastatin, asa, and BB    DM  - SSI and Accuchecks        Medical Decision Making:   I personally reviewed labs: Protein/Cr, UA, BMP, CBC   I personally reviewed imaging: none  Toxic drug monitoring:   [x] Toxicity monitoring of anti-hypertensive or antiarrhythmic medications for hypotension/hypertension/arrhythmias.  Frequent blood pressure monitoring, telemetry/EKGs: PO amlodipine 10 mg daily, carvedilol 37.5 mg BID, clonidine 0.2 mg TID, hydralazine 100 mg TID  Discussed case with: ED provider. After discussion I am in agreement that acuity of patient's medical condition necessitates hospital stay.      Code Status: Full   DVT Prophylaxis: Heparin  GI Prophylaxis:None      Subjective:   CHIEF COMPLAINT: Abnormal labs    HISTORY OF PRESENT ILLNESS:     Ayaz  Syncope    COMPARISON: 3/25/2024 radiograph.    TECHNIQUE: Portable frontal view radiograph of the chest was acquired.    FINDINGS:    Lungs: Bilateral lung opacities outside the field-of-view on current study. No  evidence of focal consolidation.  Pleura: No pleural effusion or pneumothorax.  Mediastinum: Heart, cinthia, mediastinum are within normal limits.    Upper abdomen/soft tissue: Unremarkable.  MSK: No acute osseous abnormalities.    Impression  No acute cardiopulmonary process, noting nonvisualization of the lung apices on  the provided image.        _______________________________________________________________________    TOTAL TIME:  70 Minutes    Critical Care Provided     Minutes non procedure based    Signed: CLOVIS Maradiaga NP    Procedures: see electronic medical records for all procedures/Xrays and details which were not copied into this note but were reviewed prior to creation of Plan.

## 2024-09-05 NOTE — ED TRIAGE NOTES
Pt sent for low hemoglobin, drawn yesterday, result of 6 today. Alert oriented and ambulatory on arrival    Denies any shortness of breath, chest pain, just some fatigue from time to time

## 2024-09-05 NOTE — CONSULTS
NAME:  Ayaz Villegas   :   1965   MRN:   [id number]     ATTENDING: No admitting provider for patient encounter.  PCP:  Ayaz Delgadillo MD    Date/Time:  2024       Subjective:   REQUESTING PHYSICIAN:  REASON FOR CONSULT:  ESRD on HD    History of presenting illness:    Ayaz Villegas is a 58-year-old male with past medical history including CKD V, hypertension, CAD, diabetes mellitus, and anemia as well as others listed below who presented to the emergency room at the request of Dr. Ramos, his nephrologist, for treatment of hemoglobin <6 found on outpatient labs and for initiation of HD.    Initial labs revealed creatinine of 7.0, eGFR 8, BUN 54.  Electrolytes are stable.  Repeat hemoglobin was 7.2.  Most recent creatinine was 5.1 in 2024.    Patient seen and examined at bedside.  He is awake, alert and oriented x 3, and in no acute distress.  Denies chest pain, shortness of breath, N/V, decreased appetite, swelling in extremities, urinary symptoms, fever or chills.  Discussed initiation of dialysis, but patient is hesitant to commit.    Patient has history of chronic anemia, and uncontrolled hypertension, apparently secondary to some noncompliance with medications and he claims that he had some insurance issues with medications.  Initial blood pressure was very high at 212/91 and peaked at 225/100.  Patient has some complaints of on and off headaches.    Past Medical History:   Diagnosis Date    Arrhythmia     heart murmur    CAD (coronary artery disease)     hx of stent    Chronic metabolic acidosis     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     CKD (chronic kidney disease) stage 5, GFR less than 15 ml/min (HCC)     Diabetes (HCC)     Hypertension     Iron deficiency anemia     Lumbar spondylosis     Sinus problem       Past Surgical History:   Procedure Laterality Date    CORONARY ANGIOPLASTY WITH STENT PLACEMENT      ORTHOPEDIC SURGERY      Neck surgery     Social History     Tobacco Use     10,000 units qweekly  -Continue to monitor H&H, if Hb is less than 7 tomorrow will plan for transfusion    4.  Secondary hyperparathyroidism:  Will check a phosphorus, intact PTH level tomorrow    5. Diabetes mellitus: stable.   -Urine PCR nephrotic range    Signed: CLOVIS Duncan - CNP    Addendum:  Rounds made along with Shanae Patricio NP  Patient seen and examined at bedside,    Labs and treatments reviewed   Plan discussed with patient and NP   Reviewed NP's  notes, amended and agree with assessment and plan    Dr. Lionel Fields

## 2024-09-05 NOTE — ED PROVIDER NOTES
Tools:  Not Applicable    Patient was given the following medications:  Medications   hydrALAZINE (APRESOLINE) tablet 100 mg (has no administration in time range)   epoetin laney-epbx (RETACRIT) injection 10,000 Units (has no administration in time range)   sodium chloride flush 0.9 % injection 5-40 mL (has no administration in time range)   sodium chloride flush 0.9 % injection 5-40 mL (has no administration in time range)   0.9 % sodium chloride infusion (has no administration in time range)   heparin (porcine) injection 5,000 Units (has no administration in time range)   ondansetron (ZOFRAN-ODT) disintegrating tablet 4 mg (has no administration in time range)     Or   ondansetron (ZOFRAN) injection 4 mg (has no administration in time range)   polyethylene glycol (GLYCOLAX) packet 17 g (has no administration in time range)   acetaminophen (TYLENOL) tablet 650 mg (has no administration in time range)     Or   acetaminophen (TYLENOL) suppository 650 mg (has no administration in time range)   cloNIDine (CATAPRES) tablet 0.2 mg (0.2 mg Oral Given 9/5/24 3804)       CONSULTS: See ED Course/MDM for further details.  IP CONSULT TO NEPHROLOGY  IP CONSULT TO NEPHROLOGY     Social Determinants affecting Diagnosis/Treatment: None    Smoking Cessation: Not Applicable    PROCEDURES   Unless otherwise noted above, none  Procedures      CRITICAL CARE TIME   Patient does not meet Critical Care Time, 0 minutes    ED IMPRESSION     1. Acute renal failure superimposed on stage 4 chronic kidney disease, unspecified acute renal failure type (HCC)    2. Anemia, chronic disease          DISPOSITION/PLAN   DISPOSITION Admitted 09/05/2024 05:28:31 PM  Condition at Disposition: Data Unavailable    Admit Note: Pt is being admitted by Hospitalist. The results of their tests and reason(s) for their admission have been discussed with pt and/or available family. They convey agreement and understanding for the need to be admitted and for the  Titradose  Take 4 tablets by mouth 3 times daily     sodium bicarbonate 650 MG tablet  Take 2 tablets by mouth 3 times daily (with meals)     vitamin D 50 MCG (2000 UT) Tabs tablet  Commonly known as: CHOLECALCIFEROL  Take 1 tablet by mouth daily           * This list has 18 medication(s) that are the same as other medications prescribed for you. Read the directions carefully, and ask your doctor or other care provider to review them with you.                    DISCONTINUED MEDICATIONS:  Current Discharge Medication List          I am the Primary Clinician of Record. Roc Corcoran MD (electronically signed)    (Please note that parts of this dictation were completed with voice recognition software. Quite often unanticipated grammatical, syntax, homophones, and other interpretive errors are inadvertently transcribed by the computer software. Please disregards these errors. Please excuse any errors that have escaped final proofreading.)     Roc Corcoran MD  09/05/24 9543

## 2024-09-06 LAB
25(OH)D3 SERPL-MCNC: 31.9 NG/ML (ref 30–100)
ALBUMIN SERPL-MCNC: 2.8 G/DL (ref 3.5–5)
ANION GAP SERPL CALC-SCNC: 7 MMOL/L (ref 2–12)
BASOPHILS # BLD: 0 K/UL (ref 0–0.1)
BASOPHILS NFR BLD: 1 % (ref 0–1)
BUN SERPL-MCNC: 59 MG/DL (ref 6–20)
BUN/CREAT SERPL: 9 (ref 12–20)
CA-I BLD-MCNC: 8.1 MG/DL (ref 8.5–10.1)
CA-I BLD-MCNC: 8.3 MG/DL (ref 8.5–10.1)
CHLORIDE SERPL-SCNC: 115 MMOL/L (ref 97–108)
CO2 SERPL-SCNC: 19 MMOL/L (ref 21–32)
CREAT SERPL-MCNC: 6.7 MG/DL (ref 0.7–1.3)
DIFFERENTIAL METHOD BLD: ABNORMAL
EOSINOPHIL # BLD: 0.2 K/UL (ref 0–0.4)
EOSINOPHIL NFR BLD: 5 % (ref 0–7)
ERYTHROCYTE [DISTWIDTH] IN BLOOD BY AUTOMATED COUNT: 14.6 % (ref 11.5–14.5)
FERRITIN SERPL-MCNC: 31 NG/ML (ref 26–388)
GLUCOSE SERPL-MCNC: 145 MG/DL (ref 65–100)
HCT VFR BLD AUTO: 18.9 % (ref 36.6–50.3)
HCT VFR BLD AUTO: 21.5 % (ref 36.6–50.3)
HGB BLD-MCNC: 6.1 G/DL (ref 12.1–17)
HGB BLD-MCNC: 7.1 G/DL (ref 12.1–17)
IMM GRANULOCYTES # BLD AUTO: 0 K/UL (ref 0–0.04)
IMM GRANULOCYTES NFR BLD AUTO: 0 % (ref 0–0.5)
IRON SATN MFR SERPL: 19 % (ref 20–50)
IRON SERPL-MCNC: 46 UG/DL (ref 35–150)
LYMPHOCYTES # BLD: 1.1 K/UL (ref 0.8–3.5)
LYMPHOCYTES NFR BLD: 23 % (ref 12–49)
MCH RBC QN AUTO: 30.8 PG (ref 26–34)
MCHC RBC AUTO-ENTMCNC: 32.3 G/DL (ref 30–36.5)
MCV RBC AUTO: 95.5 FL (ref 80–99)
MONOCYTES # BLD: 0.6 K/UL (ref 0–1)
MONOCYTES NFR BLD: 12 % (ref 5–13)
NEUTS SEG # BLD: 2.7 K/UL (ref 1.8–8)
NEUTS SEG NFR BLD: 59 % (ref 32–75)
NRBC # BLD: 0 K/UL (ref 0–0.01)
NRBC BLD-RTO: 0 PER 100 WBC
PHOSPHATE SERPL-MCNC: 5.5 MG/DL (ref 2.6–4.7)
PLATELET # BLD AUTO: 139 K/UL (ref 150–400)
PMV BLD AUTO: 10.5 FL (ref 8.9–12.9)
POTASSIUM SERPL-SCNC: 4.9 MMOL/L (ref 3.5–5.1)
PTH-INTACT SERPL-MCNC: 233.8 PG/ML (ref 18.4–88)
RBC # BLD AUTO: 1.98 M/UL (ref 4.1–5.7)
RBC MORPH BLD: ABNORMAL
SODIUM SERPL-SCNC: 141 MMOL/L (ref 136–145)
TIBC SERPL-MCNC: 244 UG/DL (ref 250–450)
WBC # BLD AUTO: 4.6 K/UL (ref 4.1–11.1)

## 2024-09-06 PROCEDURE — 36430 TRANSFUSION BLD/BLD COMPNT: CPT

## 2024-09-06 PROCEDURE — 82306 VITAMIN D 25 HYDROXY: CPT

## 2024-09-06 PROCEDURE — 96372 THER/PROPH/DIAG INJ SC/IM: CPT

## 2024-09-06 PROCEDURE — 80069 RENAL FUNCTION PANEL: CPT

## 2024-09-06 PROCEDURE — 36415 COLL VENOUS BLD VENIPUNCTURE: CPT

## 2024-09-06 PROCEDURE — 6360000002 HC RX W HCPCS: Performed by: NURSE PRACTITIONER

## 2024-09-06 PROCEDURE — G0378 HOSPITAL OBSERVATION PER HR: HCPCS

## 2024-09-06 PROCEDURE — 6370000000 HC RX 637 (ALT 250 FOR IP): Performed by: NURSE PRACTITIONER

## 2024-09-06 PROCEDURE — 85018 HEMOGLOBIN: CPT

## 2024-09-06 PROCEDURE — 2580000003 HC RX 258

## 2024-09-06 PROCEDURE — 82728 ASSAY OF FERRITIN: CPT

## 2024-09-06 PROCEDURE — 6360000002 HC RX W HCPCS

## 2024-09-06 PROCEDURE — 85025 COMPLETE CBC W/AUTO DIFF WBC: CPT

## 2024-09-06 PROCEDURE — 96376 TX/PRO/DX INJ SAME DRUG ADON: CPT

## 2024-09-06 PROCEDURE — P9016 RBC LEUKOCYTES REDUCED: HCPCS

## 2024-09-06 PROCEDURE — 83970 ASSAY OF PARATHORMONE: CPT

## 2024-09-06 PROCEDURE — 2500000003 HC RX 250 WO HCPCS

## 2024-09-06 PROCEDURE — 96366 THER/PROPH/DIAG IV INF ADDON: CPT

## 2024-09-06 PROCEDURE — 85014 HEMATOCRIT: CPT

## 2024-09-06 PROCEDURE — 2580000003 HC RX 258: Performed by: NURSE PRACTITIONER

## 2024-09-06 PROCEDURE — 96365 THER/PROPH/DIAG IV INF INIT: CPT

## 2024-09-06 PROCEDURE — 83540 ASSAY OF IRON: CPT

## 2024-09-06 RX ORDER — SODIUM CHLORIDE 9 MG/ML
INJECTION, SOLUTION INTRAVENOUS PRN
Status: DISCONTINUED | OUTPATIENT
Start: 2024-09-06 | End: 2024-09-07 | Stop reason: HOSPADM

## 2024-09-06 RX ORDER — SODIUM CHLORIDE 9 MG/ML
INJECTION, SOLUTION INTRAVENOUS PRN
Status: COMPLETED | OUTPATIENT
Start: 2024-09-06 | End: 2024-09-06

## 2024-09-06 RX ORDER — FERROUS SULFATE 325(65) MG
325 TABLET ORAL
Status: DISCONTINUED | OUTPATIENT
Start: 2024-09-07 | End: 2024-09-07 | Stop reason: HOSPADM

## 2024-09-06 RX ORDER — CALCIUM ACETATE 667 MG/1
1 CAPSULE ORAL
Status: DISCONTINUED | OUTPATIENT
Start: 2024-09-06 | End: 2024-09-07 | Stop reason: HOSPADM

## 2024-09-06 RX ADMIN — HEPARIN SODIUM 5000 UNITS: 5000 INJECTION INTRAVENOUS; SUBCUTANEOUS at 15:52

## 2024-09-06 RX ADMIN — HEPARIN SODIUM 5000 UNITS: 5000 INJECTION INTRAVENOUS; SUBCUTANEOUS at 06:37

## 2024-09-06 RX ADMIN — SODIUM CHLORIDE, PRESERVATIVE FREE 10 ML: 5 INJECTION INTRAVENOUS at 10:50

## 2024-09-06 RX ADMIN — ATORVASTATIN CALCIUM 40 MG: 40 TABLET, FILM COATED ORAL at 10:39

## 2024-09-06 RX ADMIN — SODIUM CHLORIDE 125 MG: 9 INJECTION, SOLUTION INTRAVENOUS at 16:18

## 2024-09-06 RX ADMIN — CLONIDINE HYDROCHLORIDE 0.2 MG: 0.1 TABLET ORAL at 15:52

## 2024-09-06 RX ADMIN — CLONIDINE HYDROCHLORIDE 0.2 MG: 0.1 TABLET ORAL at 10:40

## 2024-09-06 RX ADMIN — ISOSORBIDE DINITRATE 20 MG: 20 TABLET ORAL at 21:11

## 2024-09-06 RX ADMIN — SODIUM CHLORIDE, PRESERVATIVE FREE 10 ML: 5 INJECTION INTRAVENOUS at 22:46

## 2024-09-06 RX ADMIN — HYDRALAZINE HYDROCHLORIDE 100 MG: 50 TABLET ORAL at 06:35

## 2024-09-06 RX ADMIN — HYDRALAZINE HYDROCHLORIDE 10 MG: 20 INJECTION INTRAMUSCULAR; INTRAVENOUS at 11:32

## 2024-09-06 RX ADMIN — AMLODIPINE BESYLATE 10 MG: 5 TABLET ORAL at 10:39

## 2024-09-06 RX ADMIN — ISOSORBIDE DINITRATE 20 MG: 20 TABLET ORAL at 10:39

## 2024-09-06 RX ADMIN — GLIPIZIDE 2.5 MG: 5 TABLET ORAL at 06:36

## 2024-09-06 RX ADMIN — SODIUM CHLORIDE: 9 INJECTION, SOLUTION INTRAVENOUS at 22:46

## 2024-09-06 RX ADMIN — ISOSORBIDE DINITRATE 20 MG: 20 TABLET ORAL at 15:52

## 2024-09-06 RX ADMIN — HYDRALAZINE HYDROCHLORIDE 100 MG: 50 TABLET ORAL at 21:11

## 2024-09-06 RX ADMIN — SODIUM CHLORIDE: 9 INJECTION, SOLUTION INTRAVENOUS at 16:18

## 2024-09-06 RX ADMIN — SODIUM BICARBONATE 1300 MG: 650 TABLET ORAL at 10:39

## 2024-09-06 RX ADMIN — SODIUM BICARBONATE 1300 MG: 650 TABLET ORAL at 13:18

## 2024-09-06 RX ADMIN — SODIUM BICARBONATE 1300 MG: 650 TABLET ORAL at 17:16

## 2024-09-06 RX ADMIN — Medication 2000 UNITS: at 10:42

## 2024-09-06 RX ADMIN — CARVEDILOL 37.5 MG: 12.5 TABLET, FILM COATED ORAL at 10:57

## 2024-09-06 RX ADMIN — CARVEDILOL 37.5 MG: 12.5 TABLET, FILM COATED ORAL at 17:16

## 2024-09-06 RX ADMIN — CALCITRIOL CAPSULES 0.25 MCG 0.5 MCG: 0.25 CAPSULE ORAL at 10:39

## 2024-09-06 RX ADMIN — ASPIRIN 81 MG: 81 TABLET, COATED ORAL at 10:39

## 2024-09-06 RX ADMIN — CALCIUM ACETATE 667 MG: 667 CAPSULE ORAL at 17:16

## 2024-09-06 RX ADMIN — CLONIDINE HYDROCHLORIDE 0.2 MG: 0.1 TABLET ORAL at 21:11

## 2024-09-06 RX ADMIN — CALCIUM ACETATE 667 MG: 667 CAPSULE ORAL at 13:18

## 2024-09-06 RX ADMIN — HYDRALAZINE HYDROCHLORIDE 100 MG: 50 TABLET ORAL at 15:53

## 2024-09-06 RX ADMIN — HEPARIN SODIUM 5000 UNITS: 5000 INJECTION INTRAVENOUS; SUBCUTANEOUS at 22:52

## 2024-09-06 NOTE — PROGRESS NOTES
Renal Dosing/Monitoring  Medication: Glipizide   Current regimen:  5mg every 12 hours  Recent Labs     09/05/24  1539   CREATININE 7.05*   BUN 54*     Estimated CrCl:  11 mL/min  Plan: Change to Glipizide 2.5mg daily  per [facility] and the [facility] P&T Committee Protocol with respect to renal function.

## 2024-09-06 NOTE — CARE COORDINATION
DCP: home self care    Pt anticipated to be cleared for dc in 24 hours. Pt's blood pressures and monitoring H&H. Pt to receive blood transfusion for Hgb 6.0.     1119: CM met with pt at bedside to deliver VOON, copy provided to pt and copy placed in pt's bedside chart.

## 2024-09-06 NOTE — PROGRESS NOTES
4 Eyes Skin Assessment     NAME:  Ayaz Villegas  YOB: 1965  MEDICAL RECORD NUMBER:  [id number]    The patient is being assessed for  Admission    I agree that at least one RN has performed a thorough Head to Toe Skin Assessment on the patient. ALL assessment sites listed below have been assessed.      Areas assessed by both nurses:    Head, Face, Ears, Shoulders, Back, Chest, Arms, Elbows, Hands, Sacrum. Buttock, Coccyx, Ischium, Legs. Feet and Heels, and Under Medical Devices         Does the Patient have a Wound? No noted wound(s)       Prakash Prevention initiated by RN: No  Wound Care Orders initiated by RN: No    Pressure Injury (Stage 3,4, Unstageable, DTI, NWPT, and Complex wounds) if present, place Wound referral order by RN under : No    New Ostomies, if present place, Ostomy referral order under : No     Nurse 1 eSignature: Electronically signed by Eva Gonzalez RN on 9/6/24 at 1:17 AM EDT    **SHARE this note so that the co-signing nurse can place an eSignature**    Nurse 2 eSignature: Electronically signed by Abby Guajardo RN on 9/6/24 at 1:19 AM EDT

## 2024-09-06 NOTE — PROGRESS NOTES
Compatible    Urinalysis with Reflex to Culture    Collection Time: 09/05/24  4:35 PM    Specimen: Urine   Result Value Ref Range    Color, UA Yellow/Straw      Appearance Clear Clear      Specific Gravity, UA 1.013 1.003 - 1.030      pH, Urine 7.0 5.0 - 8.0      Protein,  (A) Negative mg/dL    Glucose, Ur 150 (A) Negative mg/dL    Ketones, Urine Negative Negative mg/dL    Bilirubin, Urine Negative Negative      Blood, Urine Negative Negative      Urobilinogen, Urine 0.1 0.1 - 1.0 EU/dL    Nitrite, Urine Negative Negative      Leukocyte Esterase, Urine Negative Negative      WBC, UA 0-4 0 - 4 /hpf    RBC, UA 0-5 0 - 5 /hpf    Epithelial Cells, UA Few Few /lpf    BACTERIA, URINE Negative Negative /hpf    Urine Culture if Indicated Culture not indicated by UA result Culture not indicated by UA result     Protein / creatinine ratio, urine    Collection Time: 09/05/24  4:35 PM   Result Value Ref Range    Protein, Urine, Random 389 (H) 0.0 - 11.9 mg/dL    Creatinine, Ur 91.00 mg/dL    PROTEIN/CREAT RATIO URINE RAN 4.3     Ferritin    Collection Time: 09/06/24  5:35 AM   Result Value Ref Range    Ferritin 31 26 - 388 ng/mL   Iron and TIBC    Collection Time: 09/06/24  5:35 AM   Result Value Ref Range    Iron 46 35 - 150 ug/dL    TIBC 244 (L) 250 - 450 ug/dL    Iron % Saturation 19 (L) 20 - 50 %   PTH, Intact    Collection Time: 09/06/24  5:35 AM   Result Value Ref Range    Calcium 8.3 (L) 8.5 - 10.1 mg/dL    Pth Intact PENDING pg/mL   Renal Function Panel    Collection Time: 09/06/24  5:35 AM   Result Value Ref Range    Sodium 141 136 - 145 mmol/L    Potassium 4.9 3.5 - 5.1 mmol/L    Chloride 115 (H) 97 - 108 mmol/L    CO2 19 (L) 21 - 32 mmol/L    Anion Gap 7 2 - 12 mmol/L    Glucose 145 (H) 65 - 100 mg/dL    BUN 59 (H) 6 - 20 mg/dL    Creatinine 6.70 (H) 0.70 - 1.30 mg/dL    BUN/Creatinine Ratio 9 (L) 12 - 20      Est, Glom Filt Rate 9 (L) >60 ml/min/1.73m2    Calcium 8.1 (L) 8.5 - 10.1 mg/dL    Phosphorus 5.5 (H)  understands the risks    2.  Renal osteodystrophy  Corrected calcium is okay  -Phosphorus is 5.5, will start Phoslo TIDWM  -iPTH 233, continue calcitriol 0.5 mcg daily, vitamin D level is sufficient    2. Hypertension: -Controlled  -Continue current antihypertensive regimen  -Advised patient to maintain salt restriction  -Will continue to monitor blood pressures and adjust antihypertensive regimen as needed     3. Anemia: Probably related to chronic kidney disease  Possible GI bleed  -Repeat Hb 6.1, to be transfused today  -Iron studies show MATT, will give a dose of Ferrlecit today, to be discharged on FeSO4 325 mg daily  -Recommend to give epogen 10,000 units qweekly  -Continue to monitor H&H  Check stool for occult blood and if positive patient needs a GI workup    6. Diabetes mellitus: stable.   -Urine PCR nephrotic range     DC plan: If repeat hemoglobin is stable and no evidence of GI bleed patient can be discharged from renal standpoint.  Patient has a follow-up appointment with Dr. Ramos.   Discharge plan discussed with Dr. Levy    Signed By: CLOVIS Duncan - CNP     September 6, 2024        Addendum:  Rounds made along with Shanae Patricio NP  Patient seen and examined at bedside,    Labs and treatments reviewed   Plan discussed with patient and NP   Reviewed NP's  notes, amended and agree with assessment and plan    Dr. Lionel Fields

## 2024-09-06 NOTE — PROGRESS NOTES
Hospitalist Progress Note    NAME:   Ayaz Villegas   : 1965   MRN: [id number]     Date/Time: 2024 12:54 PM  Patient PCP: Ayaz Delgadillo MD    Estimated discharge date: 24 -48 hours  Barriers: stabilize hemoglobin,      HOSPITAL COURSE:    Ayaz Villegas is a 58 y.o.  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  Patient is followed in the outpatient setting by Dr. Ramos, lab work from  showed a hemoglobin of 6 and an increase in the patient's renal function creatinine increased from 4.5 >7.  Patient stated he was told to come to the emergency department for further evaluation.  In the emergency department patient was found to be hypertensive, however he stated he has not taken his blood pressure medications today.  Patient received hydralazine 100 mg in the ED.  Patient also received epogen 10,000 units x 1 dose.  Labs showed hemoglobin 7.2, creatinine 7.05.  Patient to be admitted under observation status for possible initiation of dialysis.  Nephrology following.   On  patient's labs showed Hgb 6.1, will transfuse 2 units of PRBCs. Occult stool pending, will consult GI. Received IV Ferrlecit and will discharge home with daily iron supplements per nephrology recommendations.     Assessment / Plan:    CKD stage V   - Nephrology following< appreciate recommendation  -  Cr 5.1  -  Cr 7.05/BUN 54 > Cr 6.7/BUN 59  - No evidence of volume overload or uremia  - No emergent need for dialysis, patient undecided at this time per nephrology  -Renal ultrasound showed ptotic RLQ pelvic kidney with no other renal abnormalities     Anemia   - Hgb 7.2 > 6.1, will transfuse if Hgb < 7.0  - Check iron studies   - Check occult stool  - Continue Epogen per nephrology recommendations     HTN  - Blood

## 2024-09-06 NOTE — CONSENT
Informed Consent for Blood Component Transfusion Note    I have discussed with the patient the rationale for blood component transfusion; its benefits in treating or preventing fatigue, organ damage, or death; and its risk which includes mild transfusion reactions, rare risk of blood borne infection, or more serious but rare reactions. I have discussed the alternatives to transfusion, including the risk and consequences of not receiving transfusion. The patient had an opportunity to ask questions and had agreed to proceed with transfusion of blood components.    Electronically signed by CLOVIS Maradiaga NP on 9/6/24 at 12:53 PM EDT

## 2024-09-06 NOTE — PROGRESS NOTES
Patient has a unit of blood being transfused at this time, started at 1932. Dr. Negrete called and informed that blood bank has another unit of blood after this unit pending to send. She said to infuse only this one unit for now and repeat bloods 1 hr post transfusion per protocol. Patient resting comfortably and nil reaction noted so far.

## 2024-09-06 NOTE — PLAN OF CARE
Problem: Discharge Planning  Goal: Discharge to home or other facility with appropriate resources  Outcome: Progressing  Flowsheets (Taken 9/6/2024 1115)  Discharge to home or other facility with appropriate resources: Identify barriers to discharge with patient and caregiver     Problem: Safety - Adult  Goal: Free from fall injury  Outcome: Progressing     Problem: ABCDS Injury Assessment  Goal: Absence of physical injury  Outcome: Progressing     Problem: Pain  Goal: Verbalizes/displays adequate comfort level or baseline comfort level  Outcome: Adequate for Discharge

## 2024-09-07 VITALS
SYSTOLIC BLOOD PRESSURE: 173 MMHG | WEIGHT: 160 LBS | OXYGEN SATURATION: 98 % | BODY MASS INDEX: 21.2 KG/M2 | HEART RATE: 59 BPM | DIASTOLIC BLOOD PRESSURE: 76 MMHG | TEMPERATURE: 97.7 F | HEIGHT: 73 IN | RESPIRATION RATE: 18 BRPM

## 2024-09-07 LAB
ABO + RH BLD: NORMAL
ALBUMIN SERPL-MCNC: 2.8 G/DL (ref 3.5–5)
ANION GAP SERPL CALC-SCNC: 9 MMOL/L (ref 2–12)
BLD PROD TYP BPU: NORMAL
BLOOD BANK BLOOD PRODUCT EXPIRATION DATE: NORMAL
BLOOD BANK BLOOD PRODUCT EXPIRATION DATE: NORMAL
BLOOD BANK DISPENSE STATUS: NORMAL
BLOOD BANK ISBT PRODUCT BLOOD TYPE: 6200
BLOOD BANK ISBT PRODUCT BLOOD TYPE: 6200
BLOOD BANK PRODUCT CODE: NORMAL
BLOOD BANK UNIT TYPE AND RH: NORMAL
BLOOD BANK UNIT TYPE AND RH: NORMAL
BLOOD GROUP ANTIBODIES SERPL: NEGATIVE
BPU ID: NORMAL
BUN SERPL-MCNC: 57 MG/DL (ref 6–20)
BUN/CREAT SERPL: 9 (ref 12–20)
CA-I BLD-MCNC: 8.7 MG/DL (ref 8.5–10.1)
CHLORIDE SERPL-SCNC: 112 MMOL/L (ref 97–108)
CO2 SERPL-SCNC: 20 MMOL/L (ref 21–32)
CREAT SERPL-MCNC: 6.53 MG/DL (ref 0.7–1.3)
CROSSMATCH RESULT: NORMAL
GLUCOSE SERPL-MCNC: 137 MG/DL (ref 65–100)
HCT VFR BLD AUTO: 24.6 % (ref 36.6–50.3)
HGB BLD-MCNC: 8 G/DL (ref 12.1–17)
PHOSPHATE SERPL-MCNC: 5 MG/DL (ref 2.6–4.7)
POTASSIUM SERPL-SCNC: 4.4 MMOL/L (ref 3.5–5.1)
SODIUM SERPL-SCNC: 141 MMOL/L (ref 136–145)
SPECIMEN EXP DATE BLD: NORMAL
TRANSFUSION STATUS PATIENT QL: NORMAL
UNIT DIVISION: 0
UNIT ISSUE DATE/TIME: NORMAL
UNIT ISSUE DATE/TIME: NORMAL

## 2024-09-07 PROCEDURE — 2500000003 HC RX 250 WO HCPCS

## 2024-09-07 PROCEDURE — 96372 THER/PROPH/DIAG INJ SC/IM: CPT

## 2024-09-07 PROCEDURE — 6360000002 HC RX W HCPCS

## 2024-09-07 PROCEDURE — 96376 TX/PRO/DX INJ SAME DRUG ADON: CPT

## 2024-09-07 PROCEDURE — G0378 HOSPITAL OBSERVATION PER HR: HCPCS

## 2024-09-07 PROCEDURE — 36415 COLL VENOUS BLD VENIPUNCTURE: CPT

## 2024-09-07 PROCEDURE — 6370000000 HC RX 637 (ALT 250 FOR IP)

## 2024-09-07 PROCEDURE — 2580000003 HC RX 258: Performed by: NURSE PRACTITIONER

## 2024-09-07 PROCEDURE — 85014 HEMATOCRIT: CPT

## 2024-09-07 PROCEDURE — 85018 HEMOGLOBIN: CPT

## 2024-09-07 PROCEDURE — 6360000002 HC RX W HCPCS: Performed by: NURSE PRACTITIONER

## 2024-09-07 PROCEDURE — 6370000000 HC RX 637 (ALT 250 FOR IP): Performed by: NURSE PRACTITIONER

## 2024-09-07 PROCEDURE — 80069 RENAL FUNCTION PANEL: CPT

## 2024-09-07 RX ORDER — FERROUS SULFATE 325(65) MG
325 TABLET ORAL
Qty: 30 TABLET | Refills: 0 | Status: SHIPPED | OUTPATIENT
Start: 2024-09-07

## 2024-09-07 RX ORDER — GLIPIZIDE 2.5 MG/1
2.5 TABLET ORAL DAILY
Qty: 60 TABLET | Refills: 3 | Status: SHIPPED | OUTPATIENT
Start: 2024-09-08

## 2024-09-07 RX ADMIN — HYDRALAZINE HYDROCHLORIDE 10 MG: 20 INJECTION INTRAMUSCULAR; INTRAVENOUS at 02:25

## 2024-09-07 RX ADMIN — CLONIDINE HYDROCHLORIDE 0.2 MG: 0.1 TABLET ORAL at 09:29

## 2024-09-07 RX ADMIN — AMLODIPINE BESYLATE 10 MG: 5 TABLET ORAL at 09:30

## 2024-09-07 RX ADMIN — CALCIUM ACETATE 667 MG: 667 CAPSULE ORAL at 09:30

## 2024-09-07 RX ADMIN — HYDRALAZINE HYDROCHLORIDE 100 MG: 50 TABLET ORAL at 06:21

## 2024-09-07 RX ADMIN — SODIUM BICARBONATE 1300 MG: 650 TABLET ORAL at 09:30

## 2024-09-07 RX ADMIN — GLIPIZIDE 2.5 MG: 5 TABLET ORAL at 06:21

## 2024-09-07 RX ADMIN — CALCIUM ACETATE 667 MG: 667 CAPSULE ORAL at 12:07

## 2024-09-07 RX ADMIN — ISOSORBIDE DINITRATE 20 MG: 20 TABLET ORAL at 09:29

## 2024-09-07 RX ADMIN — ATORVASTATIN CALCIUM 40 MG: 40 TABLET, FILM COATED ORAL at 09:30

## 2024-09-07 RX ADMIN — SODIUM CHLORIDE, PRESERVATIVE FREE 10 ML: 5 INJECTION INTRAVENOUS at 09:31

## 2024-09-07 RX ADMIN — FERROUS SULFATE TAB 325 MG (65 MG ELEMENTAL FE) 325 MG: 325 (65 FE) TAB at 09:30

## 2024-09-07 RX ADMIN — CALCITRIOL CAPSULES 0.25 MCG 0.5 MCG: 0.25 CAPSULE ORAL at 09:30

## 2024-09-07 RX ADMIN — HEPARIN SODIUM 5000 UNITS: 5000 INJECTION INTRAVENOUS; SUBCUTANEOUS at 06:23

## 2024-09-07 RX ADMIN — SODIUM BICARBONATE 1300 MG: 650 TABLET ORAL at 12:07

## 2024-09-07 RX ADMIN — CARVEDILOL 37.5 MG: 12.5 TABLET, FILM COATED ORAL at 09:30

## 2024-09-07 RX ADMIN — Medication 2000 UNITS: at 09:30

## 2024-09-07 RX ADMIN — ASPIRIN 81 MG: 81 TABLET, COATED ORAL at 09:30

## 2024-09-07 NOTE — DISCHARGE SUMMARY
Discharge Summary    Name: Ayaz Villegas  [id number]  YOB: 1965 (Age: 58 y.o.)   Date of Admission: 9/5/2024  Date of Discharge: 9/7/2024  Attending Physician: Domenico Levy MD    Discharge Diagnosis:   Principal Problem:    CKD (chronic kidney disease)  Resolved Problems:    * No resolved hospital problems. *       Consultations:  IP CONSULT TO NEPHROLOGY  IP CONSULT TO NEPHROLOGY      Brief Admission History/Reason for Admission Per Myranda Durbin MD:     Ayaz Villegas is a 58 y.o.  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  Patient is followed in the outpatient setting by Dr. Ramos, lab work from 09/04 showed a hemoglobin of 6 and an increase in the patient's renal function creatinine increased from 4.5 >7.  Patient stated he was told to come to the emergency department for further evaluation.  In the emergency department patient was found to be hypertensive, however he stated he has not taken his blood pressure medications today.  Patient received hydralazine 100 mg in the ED.  Patient also received epogen 10,000 units x 1 dose.  Labs showed hemoglobin 7.2, creatinine 7.05.  Patient to be admitted under observation status for possible initiation of dialysis.  Nephrology following. Patient required a blood transfusion during this admission for low hemoglobin. Patient received 2 units of PRBCs. Hgb at discharge was 8.0. Labs are indicative of anemia of chronic disease. Patient's BUN/Cr are within normal limits. Patient will need to follow-up closely with nephrology and primary care in 2 weeks.       Brief Hospital Course by Main Problems:     CKD stage V: Renal function is stable at the time of discharge.  Please follow-up with primary care provider and primary

## 2024-09-07 NOTE — PROGRESS NOTES
Lab called about H and H about 15 mins ago and they informed that no phlebotomist available to draw the blood. Tube sent and blood was drawn by this primary nurse and sent to lab in [place]. Condition is stable.

## 2024-09-07 NOTE — PROGRESS NOTES
Renal Progress Note    Patient: Ayaz Villegas MRN: [id number]  SSN: [id number]    YOB: 1965  Age: 58 y.o.  Sex: male      Admit Date: 9/5/2024    LOS: 0 days     Subjective:     Patient seen at bedside, he is awake, alert and oriented. Repeat labs show creatinine of 6.5 today, stable electrolytes.     Patient denies any complaints of uremia, no complaints of shortness of breath, no complaints of swelling in lower extremities  His hemoglobin dropped to 6.1, improved to 8.0 today after blood transfusion yesterday.  No complaints of any acute bleeding.  He is complaining of some generalized tiredness.  Had a lengthy discussion with the patient today regarding starting dialysis.  Currently no impending indication.  We can wait till his AV fistula in the left arm matures    Current Facility-Administered Medications   Medication Dose Route Frequency    0.9 % sodium chloride infusion   IntraVENous PRN    ferrous sulfate (IRON 325) tablet 325 mg  325 mg Oral Daily with breakfast    calcium acetate (PHOSLO) capsule 667 mg  1 capsule Oral TID WC    hydrALAZINE (APRESOLINE) tablet 100 mg  100 mg Oral 3 times per day    epoetin laney-epbx (RETACRIT) injection 10,000 Units  10,000 Units SubCUTAneous Weekly    sodium chloride flush 0.9 % injection 5-40 mL  5-40 mL IntraVENous 2 times per day    sodium chloride flush 0.9 % injection 5-40 mL  5-40 mL IntraVENous PRN    0.9 % sodium chloride infusion   IntraVENous PRN    heparin (porcine) injection 5,000 Units  5,000 Units SubCUTAneous 3 times per day    ondansetron (ZOFRAN-ODT) disintegrating tablet 4 mg  4 mg Oral Q8H PRN    Or    ondansetron (ZOFRAN) injection 4 mg  4 mg IntraVENous Q6H PRN    polyethylene glycol (GLYCOLAX) packet 17 g  17 g Oral Daily PRN    acetaminophen (TYLENOL) tablet 650 mg  650 mg Oral Q6H PRN    Or    acetaminophen (TYLENOL) suppository 650 mg  650 mg Rectal Q6H PRN    amLODIPine (NORVASC) tablet 10 mg  10 mg Oral Daily    aspirin EC  and thrill.  No impending indication to start emergent dialysis though he has subtle uremic symptoms.  I will follow him in office in 2 to 3 weeks.  Patient understands we might have to emergently start dialysis if uremic symptoms worsen.  -He is already in listed for transplant    2.  Renal osteodystrophy  Corrected calcium is okay  -Phosphorus is 5.5, will start Phoslo TIDWM  -iPTH 233, continue calcitriol 0.5 mcg daily, vitamin D level is sufficient    2. Hypertension: -Controlled  -Continue current antihypertensive regimen  -Advised patient to maintain salt restriction  -Will continue to monitor blood pressures and adjust antihypertensive regimen as needed     3. Anemia: Probably related to chronic kidney disease  Possible GI bleed  -Repeat Hb 6.1, improved to 8.0 after transfusion yesterday  -Iron studies show MATT, will give a dose of Ferrlecit today, to be discharged on FeSO4 325 mg daily  -Recommend to give epogen 10,000 units qweekly  -Continue to monitor H&H  -He had colonoscopy done 5 years ago.  Father had history of colon cancer so gets colonoscopy every 5 years    6. Diabetes mellitus: stable.   -Urine PCR nephrotic range     DC plan: He is okay to be discharged home today.  He will follow-up with me in the office in 2 to 3 weeks with pre-clinic labs    Signed By: Ellen Ramos MD     September 7, 2024

## 2024-09-07 NOTE — CARE COORDINATION
CM noted discharge order.    DCP is home/self.    Transition of Care Plan:    RUR: N/a  Prior Level of Functioning: INDEP  Disposition: Home  If SNF or IPR: Date FOC offered: N/a  Date FOC received: N/a  Accepting facility: N/a  Date authorization started with reference number: N/a  Date authorization received and expires: N/a  Follow up appointments: Per MD  DME needed: N/a  Transportation at discharge: Patient arranged  IM/IMM Medicare/ letter given: N/a  Is patient a [place] and connected with [facility]? N/a   If yes, was  transfer form completed and [facility] notified?   Caregiver Contact: Patient notified  Discharge Caregiver contacted prior to discharge? N/a  Care Conference needed? N/a  Barriers to discharge: N/a

## 2024-09-07 NOTE — PROGRESS NOTES
Patient encouraged to pass stool, nil stool yet, a hat given and explained. A stool specimen cup in room

## 2024-09-07 NOTE — PLAN OF CARE
Problem: Discharge Planning  Goal: Discharge to home or other facility with appropriate resources  9/7/2024 0259 by Gabe Durham RN  Outcome: Progressing  9/6/2024 1421 by Teresa Mancia RN  Outcome: Progressing  Flowsheets (Taken 9/6/2024 1115)  Discharge to home or other facility with appropriate resources: Identify barriers to discharge with patient and caregiver     Problem: Pain  Goal: Verbalizes/displays adequate comfort level or baseline comfort level  9/7/2024 0259 by Gabe Durham RN  Outcome: Progressing  9/6/2024 1421 by Teresa Mancia RN  Outcome: Adequate for Discharge     Problem: Safety - Adult  Goal: Free from fall injury  9/7/2024 0259 by Gabe Durham RN  Outcome: Progressing  9/6/2024 1421 by Teresa Mancia RN  Outcome: Progressing     Problem: ABCDS Injury Assessment  Goal: Absence of physical injury  9/7/2024 0259 by Gabe Durham RN  Outcome: Progressing  9/6/2024 1421 by Teresa Mancia RN  Outcome: Progressing     Problem: Chronic Conditions and Co-morbidities  Goal: Patient's chronic conditions and co-morbidity symptoms are monitored and maintained or improved  Outcome: Progressing

## 2024-09-07 NOTE — PLAN OF CARE
Problem: Discharge Planning  Goal: Discharge to home or other facility with appropriate resources  9/7/2024 0931 by Radha Monson RN  Outcome: Progressing  9/7/2024 0259 by Gabe Durham RN  Outcome: Progressing     Problem: Pain  Goal: Verbalizes/displays adequate comfort level or baseline comfort level  9/7/2024 0931 by Radha Monson RN  Outcome: Progressing  9/7/2024 0259 by Gabe Durham RN  Outcome: Progressing     Problem: Safety - Adult  Goal: Free from fall injury  9/7/2024 0931 by Radha Monson RN  Outcome: Progressing  9/7/2024 0259 by Gabe Durham RN  Outcome: Progressing     Problem: ABCDS Injury Assessment  Goal: Absence of physical injury  9/7/2024 0931 by Radha Monson RN  Outcome: Progressing  9/7/2024 0259 by Gabe Durham RN  Outcome: Progressing     Problem: Chronic Conditions and Co-morbidities  Goal: Patient's chronic conditions and co-morbidity symptoms are monitored and maintained or improved  9/7/2024 0931 by Radha Monson RN  Outcome: Progressing  9/7/2024 0259 by Gabe Durham RN  Outcome: Progressing

## 2024-09-07 NOTE — PROGRESS NOTES
Discharge plan of care/case management plan validated with provider discharge order. Pt discharged home with self care. Follow up and medication instructions reviewed with patient. Pt verbalized understanding. IV removed, pt tolerated well. Pt escorted to door in wheelchair and left via private vehicle with his father

## 2024-09-09 LAB
ABO + RH BLD: NORMAL
BLD PROD TYP BPU: NORMAL
BLOOD BANK BLOOD PRODUCT EXPIRATION DATE: NORMAL
BLOOD BANK BLOOD PRODUCT EXPIRATION DATE: NORMAL
BLOOD BANK DISPENSE STATUS: NORMAL
BLOOD BANK ISBT PRODUCT BLOOD TYPE: 6200
BLOOD BANK ISBT PRODUCT BLOOD TYPE: 6200
BLOOD BANK PRODUCT CODE: NORMAL
BLOOD BANK UNIT TYPE AND RH: NORMAL
BLOOD BANK UNIT TYPE AND RH: NORMAL
BLOOD GROUP ANTIBODIES SERPL: NEGATIVE
BPU ID: NORMAL
CROSSMATCH RESULT: NORMAL
SPECIMEN EXP DATE BLD: NORMAL
TRANSFUSION STATUS PATIENT QL: NORMAL
UNIT DIVISION: 0
UNIT ISSUE DATE/TIME: NORMAL
UNIT ISSUE DATE/TIME: NORMAL

## 2024-09-30 NOTE — Clinical Note
"ANNUAL SUBJECTIVE    Flower Cardoso is a 44 y.o. female who presents for annual exam today.  Her periods are irregular.  She is using essure for contraception and is happy with this.  She has no complaints today.      PMH - depression    PSH - essure, splenectomy    OB history - G0  The patient has never been pregnant.    Last pap -   Normal HPV Negative    Last mammogram - 3/2024    Family history of breast or ovarian cancer - no    OBJECTIVE  /64   Ht 1.549 m (5' 1\")   Wt 57.9 kg (127 lb 9.6 oz)   LMP 08/20/2024   BMI 24.11 kg/m²     General Appearance   - consistent with stated age, well groomed and cooperative    Integumentary  - skin warm and dry without rash    Head and Neck  - normalocephalic and neck supple    Chest and Lung Exam  - normal breathing effort, no respiratory distress    Breast  - symmetry noted, no mass palpable, no skin change and no nipple discharge.    Abdomen  - soft, nontender and no hepatomegaly, splenomegaly, or mass    Female Genitourinary  - vulva normal without rash or lesion, normal vaginal rugae, no vaginal discharge, uterus normal size & no palpable masses, no adnexal mass, no adnexal tenderness, no cervical motion tenderness    Peripheral Vascular  - no edema present    ASSESSMENT/PLAN  44 y.o. yo G0 female who presents for annual exam.       Actions performed during this visit include:  - Clinical breast exam normal  - Clinical pelvic exam normal  - Pap: up to date, due next 2027  - Mammogram currently up to date, ordered for 3/2025, discussed breast density and FAST MRI, patient will think about  - Contraception essure  - continue wellbutrin for depression, refilled today    Please return for your next visit in 1 year.    Leana Foss MD    " No specimen collected. Estimated Blood Loss: <30 mL.

## 2024-10-25 ENCOUNTER — HOSPITAL ENCOUNTER (EMERGENCY)
Facility: HOSPITAL | Age: 59
Discharge: HOME | End: 2024-10-25
Attending: EMERGENCY MEDICINE
Payer: COMMERCIAL

## 2024-10-25 VITALS
TEMPERATURE: 97.2 F | BODY MASS INDEX: 21.11 KG/M2 | RESPIRATION RATE: 17 BRPM | DIASTOLIC BLOOD PRESSURE: 87 MMHG | OXYGEN SATURATION: 100 % | HEART RATE: 69 BPM | HEIGHT: 73 IN | SYSTOLIC BLOOD PRESSURE: 191 MMHG

## 2024-10-25 DIAGNOSIS — D63.1 ANEMIA DUE TO STAGE 5 CHRONIC KIDNEY DISEASE, NOT ON CHRONIC DIALYSIS (HCC): Primary | ICD-10-CM

## 2024-10-25 DIAGNOSIS — N18.5 ANEMIA DUE TO STAGE 5 CHRONIC KIDNEY DISEASE, NOT ON CHRONIC DIALYSIS (HCC): Primary | ICD-10-CM

## 2024-10-25 LAB
ANION GAP SERPL CALC-SCNC: 7 MMOL/L (ref 2–12)
BASOPHILS # BLD: 0 K/UL (ref 0–0.1)
BASOPHILS NFR BLD: 0 % (ref 0–1)
BUN SERPL-MCNC: 32 MG/DL (ref 6–20)
BUN/CREAT SERPL: 7 (ref 12–20)
CA-I BLD-MCNC: 8.9 MG/DL (ref 8.5–10.1)
CHLORIDE SERPL-SCNC: 101 MMOL/L (ref 97–108)
CO2 SERPL-SCNC: 31 MMOL/L (ref 21–32)
CREAT SERPL-MCNC: 4.84 MG/DL (ref 0.7–1.3)
DIFFERENTIAL METHOD BLD: ABNORMAL
EOSINOPHIL # BLD: 0.3 K/UL (ref 0–0.4)
EOSINOPHIL NFR BLD: 5 % (ref 0–7)
ERYTHROCYTE [DISTWIDTH] IN BLOOD BY AUTOMATED COUNT: 14.3 % (ref 11.5–14.5)
GLUCOSE SERPL-MCNC: 302 MG/DL (ref 65–100)
HCT VFR BLD AUTO: 21.8 % (ref 36.6–50.3)
HGB BLD-MCNC: 6.9 G/DL (ref 12.1–17)
IMM GRANULOCYTES # BLD AUTO: 0 K/UL (ref 0–0.04)
IMM GRANULOCYTES NFR BLD AUTO: 0 % (ref 0–0.5)
LYMPHOCYTES # BLD: 0.9 K/UL (ref 0.8–3.5)
LYMPHOCYTES NFR BLD: 18 % (ref 12–49)
MCH RBC QN AUTO: 30.9 PG (ref 26–34)
MCHC RBC AUTO-ENTMCNC: 31.7 G/DL (ref 30–36.5)
MCV RBC AUTO: 97.8 FL (ref 80–99)
MONOCYTES # BLD: 0.7 K/UL (ref 0–1)
MONOCYTES NFR BLD: 14 % (ref 5–13)
NEUTS SEG # BLD: 3.3 K/UL (ref 1.8–8)
NEUTS SEG NFR BLD: 63 % (ref 32–75)
NRBC # BLD: 0 K/UL (ref 0–0.01)
NRBC BLD-RTO: 0 PER 100 WBC
PLATELET # BLD AUTO: 143 K/UL (ref 150–400)
PMV BLD AUTO: 10.4 FL (ref 8.9–12.9)
POTASSIUM SERPL-SCNC: 4.1 MMOL/L (ref 3.5–5.1)
RBC # BLD AUTO: 2.23 M/UL (ref 4.1–5.7)
SODIUM SERPL-SCNC: 139 MMOL/L (ref 136–145)
WBC # BLD AUTO: 5.2 K/UL (ref 4.1–11.1)

## 2024-10-25 PROCEDURE — 86920 COMPATIBILITY TEST SPIN: CPT

## 2024-10-25 PROCEDURE — 99285 EMERGENCY DEPT VISIT HI MDM: CPT

## 2024-10-25 PROCEDURE — 86901 BLOOD TYPING SEROLOGIC RH(D): CPT

## 2024-10-25 PROCEDURE — 86900 BLOOD TYPING SEROLOGIC ABO: CPT

## 2024-10-25 PROCEDURE — 80048 BASIC METABOLIC PNL TOTAL CA: CPT

## 2024-10-25 PROCEDURE — P9016 RBC LEUKOCYTES REDUCED: HCPCS

## 2024-10-25 PROCEDURE — 85025 COMPLETE CBC W/AUTO DIFF WBC: CPT

## 2024-10-25 PROCEDURE — 36415 COLL VENOUS BLD VENIPUNCTURE: CPT

## 2024-10-25 PROCEDURE — 36430 TRANSFUSION BLD/BLD COMPNT: CPT

## 2024-10-25 PROCEDURE — 86850 RBC ANTIBODY SCREEN: CPT

## 2024-10-25 RX ORDER — SODIUM CHLORIDE 9 MG/ML
INJECTION, SOLUTION INTRAVENOUS PRN
Status: DISCONTINUED | OUTPATIENT
Start: 2024-10-25 | End: 2024-10-25 | Stop reason: HOSPADM

## 2024-10-25 RX ORDER — SODIUM CHLORIDE 9 MG/ML
INJECTION, SOLUTION INTRAVENOUS PRN
Status: CANCELLED | OUTPATIENT
Start: 2024-10-25

## 2024-10-25 ASSESSMENT — PAIN SCALES - GENERAL: PAINLEVEL_OUTOF10: 0

## 2024-10-25 ASSESSMENT — PAIN - FUNCTIONAL ASSESSMENT: PAIN_FUNCTIONAL_ASSESSMENT: 0-10

## 2024-10-25 NOTE — ED TRIAGE NOTES
Pt reports dialysis center called him this am and reported he had hgb of 6. Pt reports he has had to been transfused in the past, unknown source. Pt denies bloody vomitus, dark stools. Pt reports general fatigue for last few days.

## 2024-10-25 NOTE — ED PROVIDER NOTES
Musculoskeletal:         General: No swelling, tenderness, deformity or signs of injury. Normal range of motion.      Cervical back: Normal range of motion and neck supple. No rigidity or tenderness.      Right lower leg: No edema.      Left lower leg: No edema.   Skin:     General: Skin is warm and dry.      Capillary Refill: Capillary refill takes less than 2 seconds.      Findings: No erythema or rash.   Neurological:      General: No focal deficit present.      Mental Status: He is alert and oriented to person, place, and time.      Cranial Nerves: No cranial nerve deficit.      Sensory: No sensory deficit.      Motor: No weakness.      Coordination: Coordination normal.      Gait: Gait normal.   Psychiatric:         Mood and Affect: Mood normal.         Behavior: Behavior normal.         Thought Content: Thought content normal.         DIAGNOSTIC RESULTS     EKG: All EKG's are interpreted by the Emergency Department Physician who either signs or Co-signs this chart in the absence of a cardiologist.      RADIOLOGY:   Non-plain film images such as CT, Ultrasound and MRI are read by the radiologist. Plain radiographic images are visualized and preliminarily interpreted by the emergency physician with the below findings:        Interpretation per the Radiologist below, if available at the time of this note:    No orders to display         ED BEDSIDE ULTRASOUND:   Performed by ED Physician - none    LABS:  Labs Reviewed   BASIC METABOLIC PANEL - Abnormal; Notable for the following components:       Result Value    Glucose 302 (*)     BUN 32 (*)     Creatinine 4.84 (*)     BUN/Creatinine Ratio 7 (*)     Est, Glom Filt Rate 13 (*)     All other components within normal limits   CBC WITH AUTO DIFFERENTIAL - Abnormal; Notable for the following components:    RBC 2.23 (*)     Hemoglobin 6.9 (*)     Hematocrit 21.8 (*)     Platelets 143 (*)     Monocytes % 14 (*)     All other components within normal limits   TYPE AND

## 2024-10-25 NOTE — CONSENT
Informed Consent for Blood Component Transfusion Note    I have discussed with the patient the rationale for blood component transfusion; its benefits in treating or preventing fatigue, organ damage, or death; and its risk which includes mild transfusion reactions, rare risk of blood borne infection, or more serious but rare reactions. I have discussed the alternatives to transfusion, including the risk and consequences of not receiving transfusion. The patient had an opportunity to ask questions and had agreed to proceed with transfusion of blood components.    Electronically signed by Quinn Panda MD on 10/25/24 at 3:29 PM EDT

## 2024-10-26 LAB
ABO + RH BLD: NORMAL
BLD PROD TYP BPU: NORMAL
BLOOD BANK BLOOD PRODUCT EXPIRATION DATE: NORMAL
BLOOD BANK DISPENSE STATUS: NORMAL
BLOOD BANK ISBT PRODUCT BLOOD TYPE: 6200
BLOOD BANK UNIT TYPE AND RH: NORMAL
BLOOD GROUP ANTIBODIES SERPL: NEGATIVE
BPU ID: NORMAL
CROSSMATCH RESULT: NORMAL
SPECIMEN EXP DATE BLD: NORMAL
TRANSFUSION STATUS PATIENT QL: NORMAL
UNIT DIVISION: 0
UNIT ISSUE DATE/TIME: NORMAL

## 2025-03-28 ENCOUNTER — OFFICE VISIT (OUTPATIENT)
Age: 60
End: 2025-03-28
Payer: COMMERCIAL

## 2025-03-28 ENCOUNTER — PREP FOR PROCEDURE (OUTPATIENT)
Age: 60
End: 2025-03-28

## 2025-03-28 VITALS
OXYGEN SATURATION: 98 % | HEART RATE: 74 BPM | DIASTOLIC BLOOD PRESSURE: 46 MMHG | WEIGHT: 164.6 LBS | BODY MASS INDEX: 21.81 KG/M2 | HEIGHT: 73 IN | RESPIRATION RATE: 18 BRPM | TEMPERATURE: 97.6 F | SYSTOLIC BLOOD PRESSURE: 108 MMHG

## 2025-03-28 DIAGNOSIS — R10.84 ABDOMINAL PAIN, GENERALIZED: ICD-10-CM

## 2025-03-28 DIAGNOSIS — I12.0 HYPERTENSION IN STAGE 5 CHRONIC KIDNEY DISEASE DUE TO TYPE 2 DIABETES MELLITUS (HCC): ICD-10-CM

## 2025-03-28 DIAGNOSIS — K52.9 INFLAMMATORY BOWEL DISEASE: ICD-10-CM

## 2025-03-28 DIAGNOSIS — K52.9 CHRONIC DIARRHEA: ICD-10-CM

## 2025-03-28 DIAGNOSIS — N18.5 HYPERTENSION IN STAGE 5 CHRONIC KIDNEY DISEASE DUE TO TYPE 2 DIABETES MELLITUS (HCC): ICD-10-CM

## 2025-03-28 DIAGNOSIS — R10.84 GENERALIZED ABDOMINAL PAIN: ICD-10-CM

## 2025-03-28 DIAGNOSIS — K52.9 CHRONIC DIARRHEA: Primary | ICD-10-CM

## 2025-03-28 DIAGNOSIS — E11.22 HYPERTENSION IN STAGE 5 CHRONIC KIDNEY DISEASE DUE TO TYPE 2 DIABETES MELLITUS (HCC): ICD-10-CM

## 2025-03-28 PROCEDURE — 3078F DIAST BP <80 MM HG: CPT | Performed by: INTERNAL MEDICINE

## 2025-03-28 PROCEDURE — 99204 OFFICE O/P NEW MOD 45 MIN: CPT | Performed by: INTERNAL MEDICINE

## 2025-03-28 PROCEDURE — 3074F SYST BP LT 130 MM HG: CPT | Performed by: INTERNAL MEDICINE

## 2025-03-28 RX ORDER — DICYCLOMINE HYDROCHLORIDE 10 MG/1
10 CAPSULE ORAL
COMMUNITY

## 2025-03-28 RX ORDER — POLYETHYLENE GLYCOL 3350 17 G/17G
POWDER, FOR SOLUTION ORAL
Qty: 510 G | Refills: 0 | Status: SHIPPED | OUTPATIENT
Start: 2025-03-28

## 2025-03-28 RX ORDER — SEVELAMER CARBONATE 800 MG/1
1 TABLET, FILM COATED ORAL
COMMUNITY
Start: 2025-02-07

## 2025-03-28 ASSESSMENT — ENCOUNTER SYMPTOMS
RECTAL PAIN: 0
ABDOMINAL PAIN: 1
ANAL BLEEDING: 0
RESPIRATORY NEGATIVE: 1
VOMITING: 0
ALLERGIC/IMMUNOLOGIC NEGATIVE: 1
DIARRHEA: 1
BLOOD IN STOOL: 0
ABDOMINAL DISTENTION: 0
CONSTIPATION: 0
NAUSEA: 0

## 2025-03-28 NOTE — H&P (VIEW-ONLY)
Ayaz Villegas is a 59 y.o. male who presents today for the following:  Chief Complaint   Patient presents with    New Patient    Abdominal Pain    Diarrhea         Allergies   Allergen Reactions    Lisinopril Other (See Comments)     Patient stated \"It messed my kidneys up\"    Iodine Nausea And Vomiting    Iodinated Contrast Media Other (See Comments)       Current Outpatient Medications   Medication Sig Dispense Refill    sevelamer (RENVELA) 800 MG tablet Take 1 tablet by mouth 3 times daily (with meals)      dicyclomine (BENTYL) 10 MG capsule Take 1 capsule by mouth 4 times daily (before meals and nightly)      polyethylene glycol (GLYCOLAX) 17 GM/SCOOP powder Use as directed by physician. 510 g 0    glipiZIDE 2.5 MG TABS Take 2.5 mg by mouth Daily 60 tablet 3    ferrous sulfate (IRON 325) 325 (65 Fe) MG tablet Take 1 tablet by mouth daily (with breakfast) 30 tablet 0    Vitamin D (CHOLECALCIFEROL) 50 MCG (2000 UT) TABS tablet Take 1 tablet by mouth daily 60 tablet 0    isosorbide dinitrate (ISORDIL) 20 MG tablet Take 1 tablet by mouth 3 times daily Taken with hydralazine dosing 90 tablet 0    hydrALAZINE (APRESOLINE) 100 MG tablet Take 1 tablet by mouth 3 times daily 90 tablet 0    carvedilol (COREG) 25 MG tablet Take 1.5 tablets by mouth 2 times daily (with meals) 60 tablet 0    calcitRIOL (ROCALTROL) 0.5 MCG capsule Take 1 capsule by mouth daily 30 capsule 0    aspirin 81 MG EC tablet Take 1 tablet by mouth daily 90 tablet 0     No current facility-administered medications for this visit.       Past Medical History:   Diagnosis Date    Arrhythmia     heart murmur    CAD (coronary artery disease)     hx of stent    Chronic metabolic acidosis     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     CKD (chronic kidney disease) stage 5, GFR less than 15 ml/min (HCC)     Diabetes (HCC)     Hypertension     Iron deficiency anemia     Lumbar spondylosis     Sinus problem        Past Surgical History:   Procedure Laterality

## 2025-03-28 NOTE — PROGRESS NOTES
Chief Complaint   Patient presents with    New Patient    Abdominal Pain     \"Have you been to the ER, urgent care clinic since your last visit?  Hospitalized since your last visit?\"    NO    “Have you seen or consulted any other health care providers outside our system since your last visit?”    NO      “Have you had a diabetic eye exam?”    NO     No diabetic eye exam on file

## 2025-04-03 ENCOUNTER — TELEPHONE (OUTPATIENT)
Age: 60
End: 2025-04-03

## 2025-04-16 ENCOUNTER — RESULTS FOLLOW-UP (OUTPATIENT)
Age: 60
End: 2025-04-16

## 2025-04-17 ENCOUNTER — TELEPHONE (OUTPATIENT)
Age: 60
End: 2025-04-17

## 2025-04-21 ENCOUNTER — APPOINTMENT (OUTPATIENT)
Facility: HOSPITAL | Age: 60
End: 2025-04-21
Payer: COMMERCIAL

## 2025-04-21 ENCOUNTER — HOSPITAL ENCOUNTER (EMERGENCY)
Facility: HOSPITAL | Age: 60
Discharge: HOME | End: 2025-04-22
Attending: EMERGENCY MEDICINE
Payer: COMMERCIAL

## 2025-04-21 VITALS
SYSTOLIC BLOOD PRESSURE: 183 MMHG | TEMPERATURE: 98 F | RESPIRATION RATE: 18 BRPM | DIASTOLIC BLOOD PRESSURE: 76 MMHG | OXYGEN SATURATION: 99 % | HEART RATE: 87 BPM

## 2025-04-21 DIAGNOSIS — M54.32 SCIATICA OF LEFT SIDE: Primary | ICD-10-CM

## 2025-04-21 PROCEDURE — 72100 X-RAY EXAM L-S SPINE 2/3 VWS: CPT

## 2025-04-21 PROCEDURE — 6360000002 HC RX W HCPCS: Performed by: EMERGENCY MEDICINE

## 2025-04-21 PROCEDURE — 99284 EMERGENCY DEPT VISIT MOD MDM: CPT

## 2025-04-21 RX ORDER — KETOROLAC TROMETHAMINE 5 MG/ML
1 SOLUTION OPHTHALMIC
Status: ON HOLD | COMMUNITY
Start: 2024-09-17 | End: 2025-04-23

## 2025-04-21 RX ORDER — PREDNISOLONE ACETATE 10 MG/ML
1 SUSPENSION/ DROPS OPHTHALMIC
Status: ON HOLD | COMMUNITY
Start: 2024-09-17 | End: 2025-04-23 | Stop reason: HOSPADM

## 2025-04-21 RX ORDER — DEXAMETHASONE 4 MG/1
8 TABLET ORAL
Status: COMPLETED | OUTPATIENT
Start: 2025-04-21 | End: 2025-04-22

## 2025-04-21 RX ORDER — ONDANSETRON 2 MG/ML
4 INJECTION INTRAMUSCULAR; INTRAVENOUS ONCE
Status: COMPLETED | OUTPATIENT
Start: 2025-04-21 | End: 2025-04-21

## 2025-04-21 RX ORDER — SODIUM BICARBONATE 650 MG/1
3 TABLET ORAL
COMMUNITY
Start: 2024-09-25

## 2025-04-21 RX ORDER — NIFEDIPINE 60 MG/1
1 TABLET, EXTENDED RELEASE ORAL
COMMUNITY
Start: 2024-06-10

## 2025-04-21 RX ORDER — OFLOXACIN 3 MG/ML
1 SOLUTION/ DROPS OPHTHALMIC
Status: ON HOLD | COMMUNITY
Start: 2024-09-17 | End: 2025-04-23 | Stop reason: HOSPADM

## 2025-04-21 RX ORDER — ISOSORBIDE MONONITRATE 20 MG/1
1 TABLET ORAL
Status: ON HOLD | COMMUNITY
Start: 2024-12-03 | End: 2025-04-23

## 2025-04-21 RX ORDER — ONDANSETRON 2 MG/ML
4 INJECTION INTRAMUSCULAR; INTRAVENOUS ONCE
Status: DISCONTINUED | OUTPATIENT
Start: 2025-04-21 | End: 2025-04-21

## 2025-04-21 RX ADMIN — ONDANSETRON 4 MG: 2 INJECTION, SOLUTION INTRAMUSCULAR; INTRAVENOUS at 23:39

## 2025-04-21 RX ADMIN — HYDROMORPHONE HYDROCHLORIDE 1 MG: 1 INJECTION, SOLUTION INTRAMUSCULAR; INTRAVENOUS; SUBCUTANEOUS at 23:38

## 2025-04-21 ASSESSMENT — PAIN SCALES - GENERAL: PAINLEVEL_OUTOF10: 10

## 2025-04-21 ASSESSMENT — LIFESTYLE VARIABLES
HOW OFTEN DO YOU HAVE A DRINK CONTAINING ALCOHOL: PATIENT DECLINED
HOW MANY STANDARD DRINKS CONTAINING ALCOHOL DO YOU HAVE ON A TYPICAL DAY: PATIENT DECLINED

## 2025-04-21 ASSESSMENT — ENCOUNTER SYMPTOMS: BACK PAIN: 1

## 2025-04-22 PROCEDURE — 6360000002 HC RX W HCPCS: Performed by: EMERGENCY MEDICINE

## 2025-04-22 RX ORDER — OXYCODONE AND ACETAMINOPHEN 5; 325 MG/1; MG/1
1 TABLET ORAL EVERY 6 HOURS PRN
Qty: 12 TABLET | Refills: 0 | Status: SHIPPED | OUTPATIENT
Start: 2025-04-21 | End: 2025-04-24

## 2025-04-22 RX ORDER — DEXAMETHASONE 4 MG/1
4 TABLET ORAL 2 TIMES DAILY WITH MEALS
Qty: 10 TABLET | Refills: 0 | Status: SHIPPED | OUTPATIENT
Start: 2025-04-21 | End: 2025-04-26

## 2025-04-22 RX ADMIN — DEXAMETHASONE 8 MG: 4 TABLET ORAL at 00:13

## 2025-04-22 ASSESSMENT — ENCOUNTER SYMPTOMS
RESPIRATORY NEGATIVE: 1
GASTROINTESTINAL NEGATIVE: 1

## 2025-04-22 NOTE — ED TRIAGE NOTES
Pt states that he is having sciatic nerve pain that is causing him to have left leg and hip pain. Pt did have a fall yesterday from standing and the pain started soon after that.

## 2025-04-22 NOTE — ED NOTES
[facility] EMERGENCY DEPT  EMERGENCY DEPARTMENT HISTORY AND PHYSICAL EXAM      Date of evaluation: 4/21/2025  Patient Name: Ayaz Villegas  Birthdate 1965  MRN: [id number]  ED Provider: Luis Mcclure MD   Note Started: 10:49 PM EDT 4/21/25    HISTORY OF PRESENT ILLNESS     Chief Complaint   Patient presents with    Leg Pain       History Provided By: Patient     HPI: Ayaz Villegas is a 59 y.o. male with history of CKD, dialysis, hypertension, diabetes, who presents to the emergency department with complaint of severe left lower back pain that radiates down the left lower extremity with numbness and tingling.  Pain is exacerbated by movement and weightbearing.  This pain has been going on for several years, involving both right and left side.  He was diagnosis lumbar radiculopathy.  Pain is rated 15/10 severity.    PAST MEDICAL HISTORY   Past Medical History:  Past Medical History:   Diagnosis Date    Arrhythmia     heart murmur    CAD (coronary artery disease)     hx of stent    Chronic metabolic acidosis     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     CKD (chronic kidney disease) stage 5, GFR less than 15 ml/min (HCC)     Diabetes (HCC)     Hypertension     Iron deficiency anemia     Lumbar spondylosis     Sinus problem        Past Surgical History:  Past Surgical History:   Procedure Laterality Date    CORONARY ANGIOPLASTY WITH STENT PLACEMENT      ORTHOPEDIC SURGERY      Neck surgery       Family History:  Family History   Problem Relation Age of Onset    No Known Problems Brother     No Known Problems Brother     No Known Problems Brother     No Known Problems Sister     No Known Problems Sister     No Known Problems Sister     Diabetes Sister     No Known Problems Sister     Dementia Father     Diabetes Mother        Social History:  Social History     Tobacco Use    Smoking status: Never    Smokeless tobacco: Never   Vaping Use    Vaping status: Never Used   Substance Use Topics    Alcohol use: Not Currently

## 2025-04-23 ENCOUNTER — HOSPITAL ENCOUNTER (OUTPATIENT)
Facility: HOSPITAL | Age: 60
Setting detail: OUTPATIENT SURGERY
Discharge: HOME | End: 2025-04-23
Attending: INTERNAL MEDICINE | Admitting: INTERNAL MEDICINE
Payer: COMMERCIAL

## 2025-04-23 ENCOUNTER — ANESTHESIA EVENT (OUTPATIENT)
Facility: HOSPITAL | Age: 60
End: 2025-04-23
Payer: COMMERCIAL

## 2025-04-23 ENCOUNTER — ANESTHESIA (OUTPATIENT)
Facility: HOSPITAL | Age: 60
End: 2025-04-23
Payer: COMMERCIAL

## 2025-04-23 VITALS
RESPIRATION RATE: 18 BRPM | HEIGHT: 71 IN | SYSTOLIC BLOOD PRESSURE: 165 MMHG | DIASTOLIC BLOOD PRESSURE: 79 MMHG | TEMPERATURE: 97.7 F | OXYGEN SATURATION: 96 % | HEART RATE: 70 BPM | WEIGHT: 161.5 LBS | BODY MASS INDEX: 22.61 KG/M2

## 2025-04-23 DIAGNOSIS — K21.00 GASTROESOPHAGEAL REFLUX DISEASE WITH ESOPHAGITIS WITHOUT HEMORRHAGE: Primary | ICD-10-CM

## 2025-04-23 LAB
GLUCOSE BLD STRIP.AUTO-MCNC: 256 MG/DL (ref 65–100)
PERFORMED BY:: ABNORMAL

## 2025-04-23 PROCEDURE — 7100000011 HC PHASE II RECOVERY - ADDTL 15 MIN: Performed by: INTERNAL MEDICINE

## 2025-04-23 PROCEDURE — 2709999900 HC NON-CHARGEABLE SUPPLY: Performed by: INTERNAL MEDICINE

## 2025-04-23 PROCEDURE — 7100000010 HC PHASE II RECOVERY - FIRST 15 MIN: Performed by: INTERNAL MEDICINE

## 2025-04-23 PROCEDURE — 2580000003 HC RX 258: Performed by: INTERNAL MEDICINE

## 2025-04-23 PROCEDURE — 3700000001 HC ADD 15 MINUTES (ANESTHESIA): Performed by: INTERNAL MEDICINE

## 2025-04-23 PROCEDURE — 6360000002 HC RX W HCPCS: Performed by: NURSE ANESTHETIST, CERTIFIED REGISTERED

## 2025-04-23 PROCEDURE — 3600007513: Performed by: INTERNAL MEDICINE

## 2025-04-23 PROCEDURE — 3700000000 HC ANESTHESIA ATTENDED CARE: Performed by: INTERNAL MEDICINE

## 2025-04-23 PROCEDURE — 88305 TISSUE EXAM BY PATHOLOGIST: CPT

## 2025-04-23 PROCEDURE — 3600007503: Performed by: INTERNAL MEDICINE

## 2025-04-23 PROCEDURE — 82962 GLUCOSE BLOOD TEST: CPT

## 2025-04-23 RX ORDER — SODIUM CHLORIDE 450 MG/100ML
INJECTION, SOLUTION INTRAVENOUS CONTINUOUS
Status: DISCONTINUED | OUTPATIENT
Start: 2025-04-23 | End: 2025-04-23 | Stop reason: HOSPADM

## 2025-04-23 RX ORDER — SODIUM CHLORIDE 9 MG/ML
25 INJECTION, SOLUTION INTRAVENOUS PRN
Status: DISCONTINUED | OUTPATIENT
Start: 2025-04-23 | End: 2025-04-23 | Stop reason: HOSPADM

## 2025-04-23 RX ORDER — LIDOCAINE HYDROCHLORIDE 20 MG/ML
INJECTION, SOLUTION INTRAVENOUS
Status: DISCONTINUED | OUTPATIENT
Start: 2025-04-23 | End: 2025-04-23 | Stop reason: SDUPTHER

## 2025-04-23 RX ORDER — PROPOFOL 10 MG/ML
INJECTION, EMULSION INTRAVENOUS
Status: DISCONTINUED | OUTPATIENT
Start: 2025-04-23 | End: 2025-04-23 | Stop reason: SDUPTHER

## 2025-04-23 RX ORDER — SODIUM CHLORIDE 9 MG/ML
INJECTION, SOLUTION INTRAVENOUS CONTINUOUS
Status: DISCONTINUED | OUTPATIENT
Start: 2025-04-23 | End: 2025-04-23 | Stop reason: HOSPADM

## 2025-04-23 RX ORDER — MIDAZOLAM HYDROCHLORIDE 1 MG/ML
INJECTION, SOLUTION INTRAMUSCULAR; INTRAVENOUS
Status: DISCONTINUED | OUTPATIENT
Start: 2025-04-23 | End: 2025-04-23 | Stop reason: SDUPTHER

## 2025-04-23 RX ORDER — FAMOTIDINE 20 MG/1
20 TABLET, FILM COATED ORAL 2 TIMES DAILY
Qty: 180 TABLET | Refills: 1 | Status: SHIPPED | OUTPATIENT
Start: 2025-04-23

## 2025-04-23 RX ADMIN — PROPOFOL 50 MG: 10 INJECTION, EMULSION INTRAVENOUS at 11:13

## 2025-04-23 RX ADMIN — PROPOFOL 50 MG: 10 INJECTION, EMULSION INTRAVENOUS at 11:34

## 2025-04-23 RX ADMIN — PROPOFOL 30 MG: 10 INJECTION, EMULSION INTRAVENOUS at 11:40

## 2025-04-23 RX ADMIN — PROPOFOL 50 MG: 10 INJECTION, EMULSION INTRAVENOUS at 11:23

## 2025-04-23 RX ADMIN — MIDAZOLAM 2 MG: 1 INJECTION INTRAMUSCULAR; INTRAVENOUS at 11:10

## 2025-04-23 RX ADMIN — PROPOFOL 50 MG: 10 INJECTION, EMULSION INTRAVENOUS at 11:17

## 2025-04-23 RX ADMIN — LIDOCAINE HYDROCHLORIDE 100 MG: 20 INJECTION, SOLUTION INTRAVENOUS at 11:12

## 2025-04-23 RX ADMIN — SODIUM CHLORIDE: 4.5 INJECTION, SOLUTION INTRAVENOUS at 10:39

## 2025-04-23 RX ADMIN — SODIUM CHLORIDE: 4.5 INJECTION, SOLUTION INTRAVENOUS at 11:01

## 2025-04-23 ASSESSMENT — PAIN - FUNCTIONAL ASSESSMENT
PAIN_FUNCTIONAL_ASSESSMENT: 0-10

## 2025-04-23 ASSESSMENT — ENCOUNTER SYMPTOMS: SHORTNESS OF BREATH: 1

## 2025-04-23 NOTE — OP NOTE
EGD Procedure Note        Patient: Ayaz Villegas MRN: [id number]  SSN: [id number]    YOB: 1965  Age: 59 y.o.  Sex: male        Date/Time:  4/23/2025 12:04 PM         IMPRESSION:       Antral gastritis with erosions  Distal esophagitis (grade 2)       RECOMMENDATIONS:    Check biopsy results.  Will give patient a trial of famotidine 20 mg twice daily.    Procedure: Esophagogastroduodenoscopy with cold biopsies    Indication: Generalized abdominal pain, history of inflammatory bowel disease    Endoscopist:  Mason Harper Jr, MD    Referring Provider:   Ayaz Delgadillo MD    History: The history and physical exam were reviewed and updated.     Endoscope: GIF H190 Olympus video endoscope    Extent of Exam: third portion of the duodenum    ASA: ASA 2 - Patient with mild systemic disease with no functional limitations    Anethesia/Sedation:  TIVA    Description of the procedure:   The procedure was discussed with the patient including risks, benefits, alternatives including risks of iv sedation, bleeding, perforation and aspiration.  A safety timeout was performed. The patient was placed in the left lateral decubitus position.  A bite block was placed.  The patient was using standard protocol.  The patients vital signs were monitored at all times including heart rate/rhythm, blood pressure and oxygen saturation.  The endoscope was then passed under direct visualization to the third portion of the duodenum.  The endoscope was then slowly withdrawn while visualizing the mucosa.  In the stomach a retroflexion was performed and gastric fundus and cardia visualized. The patient was then transferred to recovery in stable condition.                Findings:   Esophagus:The esophageal mucosa was inflamed in the distal esophagus consistent with a grade 2 esophagitis..  Stomach: The gastric mucosa was inflamed in the gastric antrum with innumerable erosions noted.  Multiple biopsies taken there..

## 2025-04-23 NOTE — ANESTHESIA POSTPROCEDURE EVALUATION
Department of Anesthesiology  Postprocedure Note    Patient: Ayaz Villegas  MRN: [id number]  YOB: 1965  Date of evaluation: 4/23/2025    Procedure Summary       Date: 04/23/25 Room / Location: [facility] ENDO 01 / [facility] END[facility]COPY    Anesthesia Start: 1101 Anesthesia Stop: 1202    Procedures:       COLONOSCOPY, ESOPHAGOGASTRODUODENOSCOPY (Anus)      ESOPHAGOGASTRODUODENOSCOPY BIOPSY (Mouth) Diagnosis:       Inflammatory bowel disease      Abdominal pain, generalized      (Inflammatory bowel disease [K52.9])      (Abdominal pain, generalized [R10.84])    Surgeons: Mason Harper Jr., MD Responsible Provider: Calista Cardoza APRN - CRNA    Anesthesia Type: TIVA, MAC ASA Status: 3            Anesthesia Type: TIVA, MAC    Brook Phase I: Brook Score: 10    Brook Phase II:      Anesthesia Post Evaluation    Patient location during evaluation: PACU  Patient participation: waiting for patient participation  Level of consciousness: sleepy but conscious  Pain score: 0  Airway patency: patent  Nausea & Vomiting: no nausea and no vomiting  Cardiovascular status: blood pressure returned to baseline and hemodynamically stable  Respiratory status: room air and acceptable  Hydration status: euvolemic  Pain management: adequate    No notable events documented.

## 2025-04-23 NOTE — INTERVAL H&P NOTE
Update History & Physical    The patient's History and Physical of April 23, 2025 was reviewed with the patient and I examined the patient. There was no change. The surgical site was confirmed by the patient and me.     Plan: The risks, benefits, expected outcome, and alternative to the recommended procedure have been discussed with the patient. Patient understands and wants to proceed with the procedure.     Electronically signed by Mason Harper Jr, MD on 4/23/2025 at 9:55 AM

## 2025-04-23 NOTE — OP NOTE
Colonoscopy Procedure Note      Patient: Ayaz Villegas MRN: [id number]  SSN: [id number]    YOB: 1965  Age: 59 y.o.  Sex: male      Date of Procedure: 4/23/2025  Date/Time:  4/23/2025 11:58 AM       IMPRESSION:     1.  Cecal polyp   2.  Left-sided diverticulosis         RECOMMENDATIONS:     1) Check biopsy results.  2) Await pathology report. Call me in 2 weeks if you have not received any information from my office regarding your results.  3) Repeat colonoscopy in 2 to 3 years or as determined by the pathology report.       INDICATION: Inflammatory bowel disease, generalized abdominal pain    PROCEDURE PERFORMED: Colonoscopy with cold biopsies     DESCRIPTION OF PROCEDURE: An informed consent was obtained.  The patient was placed in left lateral position.  Perianal inspection and a digital rectal exam was performed.  Video colonoscope was introduced into the rectum and advanced under direct vision up to the terminal ileum.  With adequate insufflation and maneuvering of the withdrawing scope, the colonic mucosa was visualized carefully.  Retroflexion was performed in the rectum to see the anorectum and also in the ascending colon to look behind the folds.  Vital signs, pulse oximetry, single lead cardiac monitor were monitored throughout the procedure as the sedation was titrated to the desired effect ensuring patient comfort and safety.  The patient tolerated the procedure very well and was transferred to the recovery area. Following is the summary of findings: In the cecum we saw a polyp which measured 0.7 cm that was moved via multiple cold biopsies.  In the descending sigmoid colon we saw a number of small diverticula to the region.  The mucosa otherwise looked unremarkable.  Should be noted there was thick liquid stool which was noted throughout the colon.  Most of which could be evacuated.        ENDOSCOPIST: Mason Harper Jr, MD     ENDOSCOPE: [employer]

## 2025-04-23 NOTE — ANESTHESIA PRE PROCEDURE
Department of Anesthesiology  Preprocedure Note       Name:  Ayaz Villegas   Age:  59 y.o.  :  1965                                          MRN:  [id number]         Date:  2025      Surgeon: Surgeon(s):  Mason Harper Jr., MD    Procedure: Procedure(s):  COLONOSCOPY, ESOPHAGOGASTRODUODENOSCOPY  ESOPHAGOGASTRODUODENOSCOPY    Medications prior to admission:   Prior to Admission medications    Medication Sig Start Date End Date Taking? Authorizing Provider   oxyCODONE-acetaminophen (PERCOCET) 5-325 MG per tablet Take 1 tablet by mouth every 6 hours as needed for Pain for up to 3 days. Intended supply: 3 days. Take lowest dose possible to manage pain Max Daily Amount: 4 tablets 25 Yes Luis Mcclure MD   NIFEdipine (PROCARDIA XL) 60 MG extended release tablet Take 1 tablet by mouth 6/10/24  Yes Anastacia Stevenson MD   sodium bicarbonate 650 MG tablet Take 3 tablets by mouth 24  Yes Anastacia Stevenson MD   polyethylene glycol (GLYCOLAX) 17 GM/SCOOP powder Use as directed by physician. 3/28/25  Yes Mason Harper Jr., MD   ferrous sulfate (IRON 325) 325 (65 Fe) MG tablet Take 1 tablet by mouth daily (with breakfast) 24  Yes Penelope Santos APRN - NP   isosorbide dinitrate (ISORDIL) 20 MG tablet Take 1 tablet by mouth 3 times daily Taken with hydralazine dosing 3/30/24  Yes Jimmy Huang MD   hydrALAZINE (APRESOLINE) 100 MG tablet Take 1 tablet by mouth 3 times daily 3/30/24  Yes Jimmy Huang MD   carvedilol (COREG) 25 MG tablet Take 1.5 tablets by mouth 2 times daily (with meals) 3/30/24  Yes Jimmy Huang MD   aspirin 81 MG EC tablet Take 1 tablet by mouth daily 3/30/24  Yes Jimmy Huang MD   dexAMETHasone (DECADRON) 4 MG tablet Take 1 tablet by mouth 2 times daily (with meals) for 5 days 25  Luis Mcclure MD   ofloxacin (OCUFLOX) 0.3 % solution Apply 1 drop to eye 24   Anastacia Stevenson MD   prednisoLONE acetate (PRED FORTE) 1 %

## 2025-04-23 NOTE — DISCHARGE INSTRUCTIONS
For the next 12 hours you should not:   1. drive   2. drink alcohol   3. operate any machinery   4. engage in activities that require mental sharpness or manual dexterity such as     cooking   5. take any drugs other than those prescribed by a physician   6. make any legal or financial decisions    Call your doctor's office immediately, if there is is anything unusual:   1. increased and continuing rectal bleeding   2. fever   3. Unusual abdominal pain    Take it easy today and resume normal activity tomorrow.It is common to have gas and mild bloating for a few hours. Pain is NOT normal. You may be groggy off and on for a few hours.    Resume previous diet.        Mason Harper Jr, MD  4/23/2025  12:08 PM

## 2025-04-30 ENCOUNTER — RESULTS FOLLOW-UP (OUTPATIENT)
Age: 60
End: 2025-04-30

## 2025-08-22 ENCOUNTER — APPOINTMENT (OUTPATIENT)
Facility: HOSPITAL | Age: 60
End: 2025-08-22
Payer: MEDICAID

## 2025-08-22 ENCOUNTER — HOSPITAL ENCOUNTER (EMERGENCY)
Facility: HOSPITAL | Age: 60
Discharge: HOME | End: 2025-08-22
Attending: EMERGENCY MEDICINE
Payer: MEDICAID

## 2025-08-22 VITALS
WEIGHT: 160 LBS | RESPIRATION RATE: 18 BRPM | OXYGEN SATURATION: 100 % | DIASTOLIC BLOOD PRESSURE: 58 MMHG | HEIGHT: 71 IN | BODY MASS INDEX: 22.4 KG/M2 | SYSTOLIC BLOOD PRESSURE: 158 MMHG | HEART RATE: 68 BPM | TEMPERATURE: 97.7 F

## 2025-08-22 DIAGNOSIS — E08.65 DIABETES MELLITUS DUE TO UNDERLYING CONDITION, UNCONTROLLED, WITH HYPERGLYCEMIA (HCC): ICD-10-CM

## 2025-08-22 DIAGNOSIS — K92.1 MELENA: ICD-10-CM

## 2025-08-22 DIAGNOSIS — R41.82 ALTERED MENTAL STATUS, UNSPECIFIED ALTERED MENTAL STATUS TYPE: Primary | ICD-10-CM

## 2025-08-22 LAB
ALBUMIN SERPL-MCNC: 3.2 G/DL (ref 3.5–5)
ALBUMIN/GLOB SERPL: 0.7 (ref 1.1–2.2)
ALP SERPL-CCNC: 94 U/L (ref 45–117)
ALT SERPL-CCNC: 21 U/L (ref 12–78)
ANION GAP SERPL CALC-SCNC: 11 MMOL/L (ref 2–12)
AST SERPL W P-5'-P-CCNC: 16 U/L (ref 15–37)
BASOPHILS # BLD: 0.01 K/UL (ref 0–0.1)
BASOPHILS NFR BLD: 0.2 % (ref 0–1)
BILIRUB SERPL-MCNC: 0.2 MG/DL (ref 0.2–1)
BUN SERPL-MCNC: 34 MG/DL (ref 6–20)
BUN/CREAT SERPL: 5 (ref 12–20)
CA-I BLD-MCNC: 8.6 MG/DL (ref 8.5–10.1)
CHLORIDE SERPL-SCNC: 96 MMOL/L (ref 97–108)
CO2 SERPL-SCNC: 26 MMOL/L (ref 21–32)
CREAT SERPL-MCNC: 6.25 MG/DL (ref 0.7–1.3)
DIFFERENTIAL METHOD BLD: ABNORMAL
EKG ATRIAL RATE: 69 BPM
EKG DIAGNOSIS: NORMAL
EKG P AXIS: 14 DEGREES
EKG P-R INTERVAL: 229 MS
EKG Q-T INTERVAL: 430 MS
EKG QRS DURATION: 87 MS
EKG QTC CALCULATION (BAZETT): 461 MS
EKG R AXIS: 18 DEGREES
EKG T AXIS: 71 DEGREES
EKG VENTRICULAR RATE: 69 BPM
EOSINOPHIL # BLD: 0.23 K/UL (ref 0–0.4)
EOSINOPHIL NFR BLD: 4 % (ref 0–7)
ERYTHROCYTE [DISTWIDTH] IN BLOOD BY AUTOMATED COUNT: 15.9 % (ref 11.5–14.5)
GLOBULIN SER CALC-MCNC: 4.6 G/DL (ref 2–4)
GLUCOSE BLD STRIP.AUTO-MCNC: 293 MG/DL (ref 65–100)
GLUCOSE SERPL-MCNC: 401 MG/DL (ref 65–100)
HCT VFR BLD AUTO: 30.8 % (ref 36.6–50.3)
HEMOCCULT SP1 STL QL: NEGATIVE
HGB BLD-MCNC: 10.6 G/DL (ref 12.1–17)
IMM GRANULOCYTES # BLD AUTO: 0.02 K/UL (ref 0–0.04)
IMM GRANULOCYTES NFR BLD AUTO: 0.4 % (ref 0–0.5)
LACTATE SERPL-SCNC: 0.8 MMOL/L (ref 0.4–2)
LYMPHOCYTES # BLD: 1.01 K/UL (ref 0.8–3.5)
LYMPHOCYTES NFR BLD: 17.7 % (ref 12–49)
MCH RBC QN AUTO: 31.5 PG (ref 26–34)
MCHC RBC AUTO-ENTMCNC: 34.4 G/DL (ref 30–36.5)
MCV RBC AUTO: 91.4 FL (ref 80–99)
MONOCYTES # BLD: 0.88 K/UL (ref 0–1)
MONOCYTES NFR BLD: 15.4 % (ref 5–13)
NEUTS SEG # BLD: 3.56 K/UL (ref 1.8–8)
NEUTS SEG NFR BLD: 62.3 % (ref 32–75)
NRBC # BLD: 0 K/UL (ref 0–0.01)
NRBC BLD-RTO: 0 PER 100 WBC
PERFORMED BY:: ABNORMAL
PLATELET # BLD AUTO: 152 K/UL (ref 150–400)
PMV BLD AUTO: 10 FL (ref 8.9–12.9)
POTASSIUM SERPL-SCNC: 4 MMOL/L (ref 3.5–5.1)
PROT SERPL-MCNC: 7.8 G/DL (ref 6.4–8.2)
RBC # BLD AUTO: 3.37 M/UL (ref 4.1–5.7)
SODIUM SERPL-SCNC: 133 MMOL/L (ref 136–145)
TROPONIN I SERPL HS-MCNC: 70 NG/L (ref 0–76)
WBC # BLD AUTO: 5.7 K/UL (ref 4.1–11.1)

## 2025-08-22 PROCEDURE — 96372 THER/PROPH/DIAG INJ SC/IM: CPT

## 2025-08-22 PROCEDURE — 80053 COMPREHEN METABOLIC PANEL: CPT

## 2025-08-22 PROCEDURE — 82272 OCCULT BLD FECES 1-3 TESTS: CPT

## 2025-08-22 PROCEDURE — 71045 X-RAY EXAM CHEST 1 VIEW: CPT

## 2025-08-22 PROCEDURE — 93005 ELECTROCARDIOGRAM TRACING: CPT | Performed by: EMERGENCY MEDICINE

## 2025-08-22 PROCEDURE — 82962 GLUCOSE BLOOD TEST: CPT

## 2025-08-22 PROCEDURE — 96361 HYDRATE IV INFUSION ADD-ON: CPT

## 2025-08-22 PROCEDURE — 2580000003 HC RX 258: Performed by: EMERGENCY MEDICINE

## 2025-08-22 PROCEDURE — 96360 HYDRATION IV INFUSION INIT: CPT

## 2025-08-22 PROCEDURE — 84484 ASSAY OF TROPONIN QUANT: CPT

## 2025-08-22 PROCEDURE — 83605 ASSAY OF LACTIC ACID: CPT

## 2025-08-22 PROCEDURE — 99285 EMERGENCY DEPT VISIT HI MDM: CPT

## 2025-08-22 PROCEDURE — 6370000000 HC RX 637 (ALT 250 FOR IP): Performed by: EMERGENCY MEDICINE

## 2025-08-22 PROCEDURE — 85025 COMPLETE CBC W/AUTO DIFF WBC: CPT

## 2025-08-22 PROCEDURE — 36415 COLL VENOUS BLD VENIPUNCTURE: CPT

## 2025-08-22 PROCEDURE — 70450 CT HEAD/BRAIN W/O DYE: CPT

## 2025-08-22 RX ORDER — 0.9 % SODIUM CHLORIDE 0.9 %
1000 INTRAVENOUS SOLUTION INTRAVENOUS
Status: COMPLETED | OUTPATIENT
Start: 2025-08-22 | End: 2025-08-22

## 2025-08-22 RX ADMIN — INSULIN HUMAN 6 UNITS: 100 INJECTION, SOLUTION PARENTERAL at 14:55

## 2025-08-22 RX ADMIN — SODIUM CHLORIDE 1000 ML: 0.9 INJECTION, SOLUTION INTRAVENOUS at 14:55

## 2025-08-22 ASSESSMENT — LIFESTYLE VARIABLES
HOW MANY STANDARD DRINKS CONTAINING ALCOHOL DO YOU HAVE ON A TYPICAL DAY: PATIENT DECLINED
HOW OFTEN DO YOU HAVE A DRINK CONTAINING ALCOHOL: PATIENT DECLINED

## 2025-08-22 ASSESSMENT — PAIN - FUNCTIONAL ASSESSMENT: PAIN_FUNCTIONAL_ASSESSMENT: 0-10

## 2025-08-22 ASSESSMENT — PAIN SCALES - GENERAL
PAINLEVEL_OUTOF10: 0
PAINLEVEL_OUTOF10: 0

## (undated) DEVICE — Device

## (undated) DEVICE — SYRINGE ANGIO 10 CC BRL STD PRNT POLYCARB LT BLU MEDALLION

## (undated) DEVICE — TUBING PRESS INJ FLX SH 30IN --

## (undated) DEVICE — WASTEBAG DRIP/ADAPTER: Brand: MEDLINE INDUSTRIES, INC.

## (undated) DEVICE — SYRINGE MED 10ML RED POLYCARB BRL FIX M LUER CONN FLAT GRP

## (undated) DEVICE — 1200CC GUARDIAN II: Brand: GUARDIAN

## (undated) DEVICE — GUIDEWIRE VASC L260CM DIA0.035IN TIP L3MM STD EXCHG PTFE J

## (undated) DEVICE — SURGICAL PROCEDURE TRAY CRD CATH 3 PRT

## (undated) DEVICE — SYRINGE ANGIO 20ML WHT POLYCARB VAC PRSS CAP PLUNG FIX M

## (undated) DEVICE — TUBING, SUCTION, 9/32" X 10', STRAIGHT: Brand: MEDLINE

## (undated) DEVICE — MASK POM PROCEDURE OXY W/ HI CONCENTRATION CO2 MONITOR

## (undated) DEVICE — GLOVE ORANGE PI 7 1/2   MSG9075

## (undated) DEVICE — JELLY,LUBE,STERILE,FLIP TOP,TUBE,4-OZ: Brand: MEDLINE

## (undated) DEVICE — 3M™ STERI-DRAPE™ SMALL DRAPE WITH ADHESIVE APERTURE 1092 25/BX,4/CS&#X20;: Brand: STERI-DRAPE™

## (undated) DEVICE — CATHETER ETER DIAG JR 4 JL 4 PGTL 52FR SUP TORQ + MULTIPAC

## (undated) DEVICE — FORCEPS BX L240CM WRK CHN 2.8MM STD CAP W/ NDL MIC MESH

## (undated) DEVICE — GLIDESHEATH SLENDER STAINLESS STEEL KIT: Brand: GLIDESHEATH SLENDER

## (undated) DEVICE — SPONGE GZ W4XL4IN COT 12 PLY TYP VII WVN C FLD DSGN STERILE

## (undated) DEVICE — PAD,PREPPING,CUFFED,24X48,7",NONSTERILE: Brand: MEDLINE

## (undated) DEVICE — COPE MANDRIL WIRE GUIDE STAINLESS STEEL: Brand: COPE

## (undated) DEVICE — SOLUTION IRRIG 1000ML H2O PIC PLAS SHATTERPROOF CONTAINER

## (undated) DEVICE — CATH 5F 100CM JL35 -- DXTERITY

## (undated) DEVICE — SC 3W MP RA OFF PB - PG: Brand: NAMIC